# Patient Record
Sex: MALE | Race: WHITE | Employment: OTHER | URBAN - NONMETROPOLITAN AREA
[De-identification: names, ages, dates, MRNs, and addresses within clinical notes are randomized per-mention and may not be internally consistent; named-entity substitution may affect disease eponyms.]

---

## 2018-11-15 ENCOUNTER — NURSE TRIAGE (OUTPATIENT)
Dept: ADMINISTRATIVE | Age: 43
End: 2018-11-15

## 2020-01-01 ENCOUNTER — APPOINTMENT (OUTPATIENT)
Dept: GENERAL RADIOLOGY | Age: 45
DRG: 041 | End: 2020-01-01
Payer: COMMERCIAL

## 2020-01-01 ENCOUNTER — TELEPHONE (OUTPATIENT)
Dept: FAMILY MEDICINE CLINIC | Age: 45
End: 2020-01-01

## 2020-01-01 ENCOUNTER — TELEPHONE (OUTPATIENT)
Dept: ONCOLOGY | Age: 45
End: 2020-01-01

## 2020-01-01 ENCOUNTER — APPOINTMENT (OUTPATIENT)
Dept: CT IMAGING | Age: 45
DRG: 041 | End: 2020-01-01
Payer: COMMERCIAL

## 2020-01-01 ENCOUNTER — HOSPITAL ENCOUNTER (OUTPATIENT)
Dept: RADIATION ONCOLOGY | Age: 45
Discharge: HOME | End: 2020-02-19
Attending: RADIOLOGY
Payer: COMMERCIAL

## 2020-01-01 ENCOUNTER — HOSPITAL ENCOUNTER (OUTPATIENT)
Dept: INFUSION THERAPY | Age: 45
Discharge: HOME | End: 2020-03-12
Payer: COMMERCIAL

## 2020-01-01 ENCOUNTER — HOSPITAL ENCOUNTER (INPATIENT)
Age: 45
LOS: 2 days | DRG: 041 | End: 2020-05-20
Attending: INTERNAL MEDICINE | Admitting: INTERNAL MEDICINE
Payer: COMMERCIAL

## 2020-01-01 ENCOUNTER — OFFICE VISIT (OUTPATIENT)
Dept: ONCOLOGY | Age: 45
End: 2020-01-01
Payer: COMMERCIAL

## 2020-01-01 ENCOUNTER — HOSPITAL ENCOUNTER (OUTPATIENT)
Dept: RADIATION ONCOLOGY | Age: 45
Discharge: HOME | End: 2020-02-24
Attending: RADIOLOGY
Payer: COMMERCIAL

## 2020-01-01 ENCOUNTER — HOSPITAL ENCOUNTER (INPATIENT)
Age: 45
LOS: 7 days | Discharge: HOME | DRG: 133 | End: 2020-02-17
Attending: EMERGENCY MEDICINE | Admitting: INTERNAL MEDICINE
Payer: COMMERCIAL

## 2020-01-01 ENCOUNTER — TELEPHONE (OUTPATIENT)
Dept: INFUSION THERAPY | Age: 45
End: 2020-01-01

## 2020-01-01 ENCOUNTER — HOSPITAL ENCOUNTER (OUTPATIENT)
Dept: RADIATION ONCOLOGY | Age: 45
Discharge: HOME | End: 2020-02-13
Attending: RADIOLOGY
Payer: COMMERCIAL

## 2020-01-01 ENCOUNTER — APPOINTMENT (OUTPATIENT)
Dept: MRI IMAGING | Age: 45
DRG: 133 | End: 2020-01-01
Payer: COMMERCIAL

## 2020-01-01 ENCOUNTER — HOSPITAL ENCOUNTER (OUTPATIENT)
Dept: MRI IMAGING | Age: 45
Discharge: HOME | End: 2020-04-02
Payer: COMMERCIAL

## 2020-01-01 ENCOUNTER — HOSPITAL ENCOUNTER (OUTPATIENT)
Dept: RADIATION ONCOLOGY | Age: 45
End: 2020-01-01
Attending: RADIOLOGY
Payer: COMMERCIAL

## 2020-01-01 ENCOUNTER — OFFICE VISIT (OUTPATIENT)
Dept: NEUROSURGERY | Age: 45
End: 2020-01-01
Payer: COMMERCIAL

## 2020-01-01 ENCOUNTER — HOSPITAL ENCOUNTER (OUTPATIENT)
Dept: RADIATION ONCOLOGY | Age: 45
Discharge: HOME | End: 2020-02-11
Payer: COMMERCIAL

## 2020-01-01 ENCOUNTER — OFFICE VISIT (OUTPATIENT)
Dept: FAMILY MEDICINE CLINIC | Age: 45
End: 2020-01-01
Payer: COMMERCIAL

## 2020-01-01 ENCOUNTER — APPOINTMENT (OUTPATIENT)
Dept: CT IMAGING | Age: 45
DRG: 133 | End: 2020-01-01
Payer: COMMERCIAL

## 2020-01-01 ENCOUNTER — HOSPITAL ENCOUNTER (OUTPATIENT)
Dept: RADIATION ONCOLOGY | Age: 45
Discharge: HOME | End: 2020-02-21
Attending: RADIOLOGY
Payer: COMMERCIAL

## 2020-01-01 ENCOUNTER — HOSPITAL ENCOUNTER (OUTPATIENT)
Dept: RADIATION ONCOLOGY | Age: 45
Discharge: HOME | End: 2020-04-08
Payer: COMMERCIAL

## 2020-01-01 ENCOUNTER — HOSPITAL ENCOUNTER (INPATIENT)
Age: 45
LOS: 6 days | Discharge: HOSPICE | DRG: 041 | End: 2020-05-18
Attending: EMERGENCY MEDICINE | Admitting: INTERNAL MEDICINE
Payer: COMMERCIAL

## 2020-01-01 ENCOUNTER — HOSPITAL ENCOUNTER (OUTPATIENT)
Dept: INFUSION THERAPY | Age: 45
Discharge: HOME | End: 2020-05-07
Payer: COMMERCIAL

## 2020-01-01 ENCOUNTER — HOSPITAL ENCOUNTER (OUTPATIENT)
Dept: RADIATION ONCOLOGY | Age: 45
Discharge: HOME | End: 2020-02-18
Attending: RADIOLOGY
Payer: COMMERCIAL

## 2020-01-01 ENCOUNTER — HOSPITAL ENCOUNTER (OUTPATIENT)
Dept: RADIATION ONCOLOGY | Age: 45
Discharge: HOME | End: 2020-02-20
Attending: RADIOLOGY
Payer: COMMERCIAL

## 2020-01-01 ENCOUNTER — HOSPITAL ENCOUNTER (OUTPATIENT)
Dept: INFUSION THERAPY | Age: 45
Discharge: HOME | End: 2020-04-09
Payer: COMMERCIAL

## 2020-01-01 ENCOUNTER — APPOINTMENT (OUTPATIENT)
Dept: GENERAL RADIOLOGY | Age: 45
DRG: 133 | End: 2020-01-01
Payer: COMMERCIAL

## 2020-01-01 ENCOUNTER — HOSPITAL ENCOUNTER (OUTPATIENT)
Dept: RADIATION ONCOLOGY | Age: 45
Discharge: HOME | End: 2020-02-27
Attending: RADIOLOGY
Payer: COMMERCIAL

## 2020-01-01 ENCOUNTER — HOSPITAL ENCOUNTER (OUTPATIENT)
Dept: RADIATION ONCOLOGY | Age: 45
Discharge: HOME | End: 2020-02-26
Attending: RADIOLOGY
Payer: COMMERCIAL

## 2020-01-01 ENCOUNTER — HOSPITAL ENCOUNTER (OUTPATIENT)
Dept: RADIATION ONCOLOGY | Age: 45
Discharge: HOME | End: 2020-04-23
Payer: COMMERCIAL

## 2020-01-01 ENCOUNTER — NURSE ONLY (OUTPATIENT)
Dept: INFUSION THERAPY | Age: 45
End: 2020-01-01

## 2020-01-01 ENCOUNTER — TELEPHONE (OUTPATIENT)
Dept: NEUROSURGERY | Age: 45
End: 2020-01-01

## 2020-01-01 ENCOUNTER — INITIAL CONSULT (OUTPATIENT)
Dept: NEUROLOGY | Age: 45
End: 2020-01-01
Payer: COMMERCIAL

## 2020-01-01 ENCOUNTER — HOSPITAL ENCOUNTER (OUTPATIENT)
Dept: RADIATION ONCOLOGY | Age: 45
Discharge: HOME | End: 2020-02-25
Attending: RADIOLOGY
Payer: COMMERCIAL

## 2020-01-01 ENCOUNTER — HOSPITAL ENCOUNTER (INPATIENT)
Dept: CT IMAGING | Age: 45
Discharge: HOME | DRG: 133 | End: 2020-02-13
Payer: COMMERCIAL

## 2020-01-01 ENCOUNTER — HOSPITAL ENCOUNTER (OUTPATIENT)
Dept: RADIATION ONCOLOGY | Age: 45
Discharge: HOME | End: 2020-02-14
Attending: RADIOLOGY
Payer: COMMERCIAL

## 2020-01-01 VITALS
DIASTOLIC BLOOD PRESSURE: 78 MMHG | WEIGHT: 196.8 LBS | BODY MASS INDEX: 28.18 KG/M2 | HEIGHT: 70 IN | OXYGEN SATURATION: 97 % | HEART RATE: 84 BPM | SYSTOLIC BLOOD PRESSURE: 131 MMHG | TEMPERATURE: 97.7 F | RESPIRATION RATE: 19 BRPM

## 2020-01-01 VITALS
SYSTOLIC BLOOD PRESSURE: 165 MMHG | BODY MASS INDEX: 25.66 KG/M2 | WEIGHT: 179.23 LBS | DIASTOLIC BLOOD PRESSURE: 90 MMHG | HEART RATE: 91 BPM | TEMPERATURE: 97.5 F | RESPIRATION RATE: 20 BRPM | HEIGHT: 70 IN | OXYGEN SATURATION: 93 %

## 2020-01-01 VITALS
DIASTOLIC BLOOD PRESSURE: 98 MMHG | TEMPERATURE: 98.5 F | WEIGHT: 198.8 LBS | SYSTOLIC BLOOD PRESSURE: 150 MMHG | HEART RATE: 116 BPM | BODY MASS INDEX: 28.52 KG/M2

## 2020-01-01 VITALS
SYSTOLIC BLOOD PRESSURE: 130 MMHG | WEIGHT: 192.2 LBS | BODY MASS INDEX: 27.52 KG/M2 | RESPIRATION RATE: 18 BRPM | HEIGHT: 70 IN | HEART RATE: 125 BPM | DIASTOLIC BLOOD PRESSURE: 85 MMHG | OXYGEN SATURATION: 95 % | TEMPERATURE: 98.2 F

## 2020-01-01 VITALS
OXYGEN SATURATION: 97 % | RESPIRATION RATE: 16 BRPM | HEART RATE: 129 BPM | DIASTOLIC BLOOD PRESSURE: 76 MMHG | SYSTOLIC BLOOD PRESSURE: 127 MMHG | TEMPERATURE: 97.3 F

## 2020-01-01 VITALS
RESPIRATION RATE: 16 BRPM | DIASTOLIC BLOOD PRESSURE: 86 MMHG | TEMPERATURE: 96.6 F | BODY MASS INDEX: 27.96 KG/M2 | OXYGEN SATURATION: 98 % | SYSTOLIC BLOOD PRESSURE: 149 MMHG | HEART RATE: 76 BPM | WEIGHT: 194.89 LBS

## 2020-01-01 VITALS
BODY MASS INDEX: 27.6 KG/M2 | SYSTOLIC BLOOD PRESSURE: 138 MMHG | WEIGHT: 192.8 LBS | HEART RATE: 84 BPM | DIASTOLIC BLOOD PRESSURE: 88 MMHG | HEIGHT: 70 IN

## 2020-01-01 VITALS
DIASTOLIC BLOOD PRESSURE: 82 MMHG | SYSTOLIC BLOOD PRESSURE: 130 MMHG | HEIGHT: 70 IN | BODY MASS INDEX: 28.89 KG/M2 | WEIGHT: 201.8 LBS | TEMPERATURE: 97.9 F | OXYGEN SATURATION: 98 % | RESPIRATION RATE: 16 BRPM | HEART RATE: 127 BPM

## 2020-01-01 VITALS
HEART RATE: 167 BPM | TEMPERATURE: 99.9 F | SYSTOLIC BLOOD PRESSURE: 88 MMHG | RESPIRATION RATE: 30 BRPM | OXYGEN SATURATION: 70 % | DIASTOLIC BLOOD PRESSURE: 57 MMHG

## 2020-01-01 VITALS
OXYGEN SATURATION: 97 % | BODY MASS INDEX: 29.2 KG/M2 | HEART RATE: 92 BPM | RESPIRATION RATE: 16 BRPM | HEIGHT: 70 IN | TEMPERATURE: 97.6 F | SYSTOLIC BLOOD PRESSURE: 157 MMHG | WEIGHT: 204 LBS | DIASTOLIC BLOOD PRESSURE: 89 MMHG

## 2020-01-01 VITALS
RESPIRATION RATE: 18 BRPM | TEMPERATURE: 99.4 F | OXYGEN SATURATION: 100 % | BODY MASS INDEX: 27.97 KG/M2 | DIASTOLIC BLOOD PRESSURE: 89 MMHG | WEIGHT: 195.4 LBS | SYSTOLIC BLOOD PRESSURE: 158 MMHG | HEART RATE: 96 BPM | HEIGHT: 70 IN

## 2020-01-01 DIAGNOSIS — R56.9 SEIZURES (HCC): ICD-10-CM

## 2020-01-01 DIAGNOSIS — C79.31 MALIGNANT MELANOMA METASTATIC TO BRAIN (HCC): ICD-10-CM

## 2020-01-01 LAB
ABSOLUTE IMMATURE GRANULOCYTE: 0.33 THOU/MM3 (ref 0–0.07)
ACETAMINOPHEN LEVEL: < 5 UG/ML (ref 0–20)
ALBUMIN SERPL-MCNC: 4 G/DL (ref 3.5–5.1)
ALBUMIN SERPL-MCNC: 4.1 G/DL (ref 3.5–5.1)
ALBUMIN SERPL-MCNC: 4.3 G/DL (ref 3.5–5.1)
ALBUMIN SERPL-MCNC: 4.6 G/DL (ref 3.5–5.1)
ALP BLD-CCNC: 49 U/L (ref 38–126)
ALP BLD-CCNC: 50 U/L (ref 38–126)
ALP BLD-CCNC: 50 U/L (ref 38–126)
ALP BLD-CCNC: 55 U/L (ref 38–126)
ALP BLD-CCNC: 59 U/L (ref 38–126)
ALP BLD-CCNC: 77 U/L (ref 38–126)
ALT SERPL-CCNC: 111 U/L (ref 11–66)
ALT SERPL-CCNC: 31 U/L (ref 11–66)
ALT SERPL-CCNC: 40 U/L (ref 11–66)
ALT SERPL-CCNC: 57 U/L (ref 11–66)
ALT SERPL-CCNC: 66 U/L (ref 11–66)
ALT SERPL-CCNC: 83 U/L (ref 11–66)
AMMONIA: 179 UMOL/L (ref 11–60)
AMPHETAMINE+METHAMPHETAMINE URINE SCREEN: NEGATIVE
AMPHETAMINE+METHAMPHETAMINE URINE SCREEN: NEGATIVE
ANAEROBIC CULTURE: ABNORMAL
ANION GAP SERPL CALCULATED.3IONS-SCNC: 10 MEQ/L (ref 8–16)
ANION GAP SERPL CALCULATED.3IONS-SCNC: 11 MEQ/L (ref 8–16)
ANION GAP SERPL CALCULATED.3IONS-SCNC: 11 MEQ/L (ref 8–16)
ANION GAP SERPL CALCULATED.3IONS-SCNC: 13 MEQ/L (ref 8–16)
ANION GAP SERPL CALCULATED.3IONS-SCNC: 15 MEQ/L (ref 8–16)
ANION GAP SERPL CALCULATED.3IONS-SCNC: 16 MEQ/L (ref 8–16)
ANION GAP SERPL CALCULATED.3IONS-SCNC: 26 MEQ/L (ref 8–16)
ANION GAP SERPL CALCULATED.3IONS-SCNC: 7 MEQ/L (ref 8–16)
ANION GAP SERPL CALCULATED.3IONS-SCNC: 9 MEQ/L (ref 8–16)
AST SERPL-CCNC: 14 U/L (ref 5–40)
AST SERPL-CCNC: 19 U/L (ref 5–40)
AST SERPL-CCNC: 23 U/L (ref 5–40)
AST SERPL-CCNC: 27 U/L (ref 5–40)
AST SERPL-CCNC: 35 U/L (ref 5–40)
AST SERPL-CCNC: 60 U/L (ref 5–40)
AVERAGE GLUCOSE: 120 MG/DL (ref 70–126)
BACTERIA: ABNORMAL
BARBITURATE QUANTITATIVE URINE: NEGATIVE
BARBITURATE QUANTITATIVE URINE: NEGATIVE
BASE EXCESS (CALCULATED): -7.7 MMOL/L (ref -2.5–2.5)
BASINOPHIL, AUTOMATED: 0 % (ref 0–3)
BASOPHILS # BLD: 0 %
BASOPHILS # BLD: 0.1 %
BASOPHILS # BLD: 0.2 %
BASOPHILS # BLD: 0.5 %
BASOPHILS ABSOLUTE: 0 THOU/MM3 (ref 0–0.1)
BASOPHILS ABSOLUTE: 0.1 THOU/MM3 (ref 0–0.1)
BENZODIAZEPINE QUANTITATIVE URINE: NEGATIVE
BENZODIAZEPINE QUANTITATIVE URINE: NEGATIVE
BILIRUB SERPL-MCNC: 0.2 MG/DL (ref 0.3–1.2)
BILIRUB SERPL-MCNC: 0.4 MG/DL (ref 0.3–1.2)
BILIRUB SERPL-MCNC: 0.6 MG/DL (ref 0.3–1.2)
BILIRUBIN DIRECT: < 0.2 MG/DL (ref 0–0.3)
BILIRUBIN URINE: NEGATIVE
BILIRUBIN URINE: NEGATIVE
BLOOD, URINE: ABNORMAL
BLOOD, URINE: NEGATIVE
BODY FLUID CULTURE, STERILE: ABNORMAL
BUN BLDV-MCNC: 11 MG/DL (ref 7–22)
BUN BLDV-MCNC: 12 MG/DL (ref 7–22)
BUN BLDV-MCNC: 12 MG/DL (ref 7–22)
BUN BLDV-MCNC: 13 MG/DL (ref 7–22)
BUN BLDV-MCNC: 13 MG/DL (ref 7–22)
BUN BLDV-MCNC: 17 MG/DL (ref 7–22)
BUN BLDV-MCNC: 17 MG/DL (ref 7–22)
BUN BLDV-MCNC: 18 MG/DL (ref 7–22)
BUN BLDV-MCNC: 19 MG/DL (ref 7–22)
BUN BLDV-MCNC: 22 MG/DL (ref 7–22)
BUN BLDV-MCNC: 23 MG/DL (ref 7–22)
BUN BLDV-MCNC: 23 MG/DL (ref 7–22)
BUN BLDV-MCNC: 25 MG/DL (ref 7–22)
CALCIUM SERPL-MCNC: 8.5 MG/DL (ref 8.5–10.5)
CALCIUM SERPL-MCNC: 8.8 MG/DL (ref 8.5–10.5)
CALCIUM SERPL-MCNC: 9.1 MG/DL (ref 8.5–10.5)
CALCIUM SERPL-MCNC: 9.2 MG/DL (ref 8.5–10.5)
CALCIUM SERPL-MCNC: 9.2 MG/DL (ref 8.5–10.5)
CALCIUM SERPL-MCNC: 9.3 MG/DL (ref 8.5–10.5)
CALCIUM SERPL-MCNC: 9.4 MG/DL (ref 8.5–10.5)
CALCIUM SERPL-MCNC: 9.4 MG/DL (ref 8.5–10.5)
CALCIUM SERPL-MCNC: 9.5 MG/DL (ref 8.5–10.5)
CALCIUM SERPL-MCNC: 9.5 MG/DL (ref 8.5–10.5)
CALCIUM SERPL-MCNC: 9.7 MG/DL (ref 8.5–10.5)
CANNABINOID QUANTITATIVE URINE: NEGATIVE
CANNABINOID QUANTITATIVE URINE: NEGATIVE
CASTS: ABNORMAL /LPF
CASTS: ABNORMAL /LPF
CHARACTER, URINE: CLEAR
CHARACTER, URINE: CLEAR
CHLORIDE BLD-SCNC: 101 MEQ/L (ref 98–111)
CHLORIDE BLD-SCNC: 103 MEQ/L (ref 98–111)
CHLORIDE BLD-SCNC: 104 MEQ/L (ref 98–111)
CHLORIDE BLD-SCNC: 105 MEQ/L (ref 98–111)
CHLORIDE BLD-SCNC: 106 MEQ/L (ref 98–111)
CHLORIDE BLD-SCNC: 108 MEQ/L (ref 98–111)
CHLORIDE BLD-SCNC: 97 MEQ/L (ref 98–111)
CHLORIDE, WHOLE BLOOD: 104 MEQ/L (ref 98–109)
CHLORIDE, WHOLE BLOOD: 113 MEQ/L (ref 98–109)
CO2: 12 MEQ/L (ref 23–33)
CO2: 19 MEQ/L (ref 23–33)
CO2: 20 MEQ/L (ref 23–33)
CO2: 20 MEQ/L (ref 23–33)
CO2: 21 MEQ/L (ref 23–33)
CO2: 21 MEQ/L (ref 23–33)
CO2: 22 MEQ/L (ref 23–33)
CO2: 24 MEQ/L (ref 23–33)
CO2: 25 MEQ/L (ref 23–33)
CO2: 25 MEQ/L (ref 23–33)
CO2: 28 MEQ/L (ref 23–33)
COCAINE METABOLITE QUANTITATIVE URINE: NEGATIVE
COCAINE METABOLITE QUANTITATIVE URINE: NEGATIVE
COLLECTED BY:: ABNORMAL
COLOR: YELLOW
COLOR: YELLOW
CREAT SERPL-MCNC: 0.4 MG/DL (ref 0.4–1.2)
CREAT SERPL-MCNC: 0.5 MG/DL (ref 0.4–1.2)
CREAT SERPL-MCNC: 0.5 MG/DL (ref 0.4–1.2)
CREAT SERPL-MCNC: 0.7 MG/DL (ref 0.4–1.2)
CREAT SERPL-MCNC: 0.7 MG/DL (ref 0.4–1.2)
CREAT SERPL-MCNC: 0.8 MG/DL (ref 0.4–1.2)
CREAT SERPL-MCNC: 0.9 MG/DL (ref 0.4–1.2)
CREAT SERPL-MCNC: 1 MG/DL (ref 0.4–1.2)
CREATININE, WHOLE BLOOD: 0.8 MG/DL (ref 0.5–1.2)
CREATININE, WHOLE BLOOD: 1 MG/DL (ref 0.5–1.2)
CRYSTALS: ABNORMAL
DEVICE: ABNORMAL
DIFFERENTIAL, MANUAL: NORMAL
EKG ATRIAL RATE: 100 BPM
EKG P AXIS: 49 DEGREES
EKG P-R INTERVAL: 160 MS
EKG Q-T INTERVAL: 350 MS
EKG QRS DURATION: 100 MS
EKG QTC CALCULATION (BAZETT): 451 MS
EKG R AXIS: -42 DEGREES
EKG T AXIS: 58 DEGREES
EKG VENTRICULAR RATE: 100 BPM
EOSINOPHIL # BLD: 0 %
EOSINOPHIL # BLD: 0.6 %
EOSINOPHILS ABSOLUTE: 0 THOU/MM3 (ref 0–0.4)
EOSINOPHILS ABSOLUTE: 0.1 THOU/MM3 (ref 0–0.4)
EOSINOPHILS RELATIVE PERCENT: 0 % (ref 0–4)
EPITHELIAL CELLS, UA: ABNORMAL /HPF
ERYTHROCYTE [DISTWIDTH] IN BLOOD BY AUTOMATED COUNT: 12.4 % (ref 11.5–14.5)
ERYTHROCYTE [DISTWIDTH] IN BLOOD BY AUTOMATED COUNT: 12.7 % (ref 11.5–14.5)
ERYTHROCYTE [DISTWIDTH] IN BLOOD BY AUTOMATED COUNT: 13.1 % (ref 11.5–14.5)
ERYTHROCYTE [DISTWIDTH] IN BLOOD BY AUTOMATED COUNT: 13.2 % (ref 11.5–14.5)
ERYTHROCYTE [DISTWIDTH] IN BLOOD BY AUTOMATED COUNT: 13.3 % (ref 11.5–14.5)
ERYTHROCYTE [DISTWIDTH] IN BLOOD BY AUTOMATED COUNT: 13.6 % (ref 11.5–14.5)
ERYTHROCYTE [DISTWIDTH] IN BLOOD BY AUTOMATED COUNT: 15 % (ref 11.5–14.5)
ERYTHROCYTE [DISTWIDTH] IN BLOOD BY AUTOMATED COUNT: 15.2 % (ref 11.5–14.5)
ERYTHROCYTE [DISTWIDTH] IN BLOOD BY AUTOMATED COUNT: 15.3 % (ref 11.5–14.5)
ERYTHROCYTE [DISTWIDTH] IN BLOOD BY AUTOMATED COUNT: 15.4 % (ref 11.5–14.5)
ERYTHROCYTE [DISTWIDTH] IN BLOOD BY AUTOMATED COUNT: 43.3 FL (ref 35–45)
ERYTHROCYTE [DISTWIDTH] IN BLOOD BY AUTOMATED COUNT: 44.5 FL (ref 35–45)
ERYTHROCYTE [DISTWIDTH] IN BLOOD BY AUTOMATED COUNT: 46.5 FL (ref 35–45)
ERYTHROCYTE [DISTWIDTH] IN BLOOD BY AUTOMATED COUNT: 47.9 FL (ref 35–45)
ERYTHROCYTE [DISTWIDTH] IN BLOOD BY AUTOMATED COUNT: 49.7 FL (ref 35–45)
ERYTHROCYTE [DISTWIDTH] IN BLOOD BY AUTOMATED COUNT: 50.4 FL (ref 35–45)
ERYTHROCYTE [DISTWIDTH] IN BLOOD BY AUTOMATED COUNT: 54.2 FL (ref 35–45)
ERYTHROCYTE [DISTWIDTH] IN BLOOD BY AUTOMATED COUNT: 54.4 FL (ref 35–45)
ERYTHROCYTE [DISTWIDTH] IN BLOOD BY AUTOMATED COUNT: 55 FL (ref 35–45)
ERYTHROCYTE [DISTWIDTH] IN BLOOD BY AUTOMATED COUNT: 56.5 FL (ref 35–45)
ETHYL ALCOHOL, SERUM: < 0.01 %
ETHYL ALCOHOL, SERUM: < 0.01 %
GFR SERPL CREATININE-BSD FRML MDRD: 80 ML/MIN/1.73M2
GFR SERPL CREATININE-BSD FRML MDRD: > 90 ML/MIN/1.73M2
GFR, ESTIMATED: 85 ML/MIN/1.73M2
GFR, ESTIMATED: > 90 ML/MIN/1.73M2
GLUCOSE BLD-MCNC: 103 MG/DL (ref 70–108)
GLUCOSE BLD-MCNC: 104 MG/DL (ref 70–108)
GLUCOSE BLD-MCNC: 110 MG/DL (ref 70–108)
GLUCOSE BLD-MCNC: 112 MG/DL (ref 70–108)
GLUCOSE BLD-MCNC: 113 MG/DL (ref 70–108)
GLUCOSE BLD-MCNC: 117 MG/DL (ref 70–108)
GLUCOSE BLD-MCNC: 123 MG/DL (ref 70–108)
GLUCOSE BLD-MCNC: 124 MG/DL (ref 70–108)
GLUCOSE BLD-MCNC: 124 MG/DL (ref 70–108)
GLUCOSE BLD-MCNC: 126 MG/DL (ref 70–108)
GLUCOSE BLD-MCNC: 126 MG/DL (ref 70–108)
GLUCOSE BLD-MCNC: 129 MG/DL (ref 70–108)
GLUCOSE BLD-MCNC: 133 MG/DL (ref 70–108)
GLUCOSE BLD-MCNC: 133 MG/DL (ref 70–108)
GLUCOSE BLD-MCNC: 134 MG/DL (ref 70–108)
GLUCOSE BLD-MCNC: 134 MG/DL (ref 70–108)
GLUCOSE BLD-MCNC: 135 MG/DL (ref 70–108)
GLUCOSE BLD-MCNC: 139 MG/DL (ref 70–108)
GLUCOSE BLD-MCNC: 143 MG/DL (ref 70–108)
GLUCOSE BLD-MCNC: 148 MG/DL (ref 70–108)
GLUCOSE BLD-MCNC: 151 MG/DL (ref 70–108)
GLUCOSE BLD-MCNC: 152 MG/DL (ref 70–108)
GLUCOSE BLD-MCNC: 155 MG/DL (ref 70–108)
GLUCOSE BLD-MCNC: 155 MG/DL (ref 70–108)
GLUCOSE BLD-MCNC: 160 MG/DL (ref 70–108)
GLUCOSE BLD-MCNC: 167 MG/DL (ref 70–108)
GLUCOSE BLD-MCNC: 171 MG/DL (ref 70–108)
GLUCOSE BLD-MCNC: 172 MG/DL (ref 70–108)
GLUCOSE BLD-MCNC: 186 MG/DL (ref 70–108)
GLUCOSE BLD-MCNC: 195 MG/DL (ref 70–108)
GLUCOSE BLD-MCNC: 196 MG/DL (ref 70–108)
GLUCOSE BLD-MCNC: 198 MG/DL (ref 70–108)
GLUCOSE BLD-MCNC: 199 MG/DL (ref 70–108)
GLUCOSE BLD-MCNC: 199 MG/DL (ref 70–108)
GLUCOSE BLD-MCNC: 201 MG/DL (ref 70–108)
GLUCOSE BLD-MCNC: 206 MG/DL (ref 70–108)
GLUCOSE BLD-MCNC: 212 MG/DL (ref 70–108)
GLUCOSE BLD-MCNC: 213 MG/DL (ref 70–108)
GLUCOSE BLD-MCNC: 213 MG/DL (ref 70–108)
GLUCOSE BLD-MCNC: 214 MG/DL (ref 70–108)
GLUCOSE BLD-MCNC: 221 MG/DL (ref 70–108)
GLUCOSE BLD-MCNC: 222 MG/DL (ref 70–108)
GLUCOSE BLD-MCNC: 223 MG/DL (ref 70–108)
GLUCOSE BLD-MCNC: 225 MG/DL (ref 70–108)
GLUCOSE BLD-MCNC: 226 MG/DL (ref 70–108)
GLUCOSE BLD-MCNC: 226 MG/DL (ref 70–108)
GLUCOSE BLD-MCNC: 235 MG/DL (ref 70–108)
GLUCOSE BLD-MCNC: 236 MG/DL (ref 70–108)
GLUCOSE BLD-MCNC: 237 MG/DL (ref 70–108)
GLUCOSE BLD-MCNC: 237 MG/DL (ref 70–108)
GLUCOSE BLD-MCNC: 239 MG/DL (ref 70–108)
GLUCOSE BLD-MCNC: 242 MG/DL (ref 70–108)
GLUCOSE BLD-MCNC: 248 MG/DL (ref 70–108)
GLUCOSE BLD-MCNC: 253 MG/DL (ref 70–108)
GLUCOSE BLD-MCNC: 255 MG/DL (ref 70–108)
GLUCOSE BLD-MCNC: 259 MG/DL (ref 70–108)
GLUCOSE BLD-MCNC: 260 MG/DL (ref 70–108)
GLUCOSE BLD-MCNC: 261 MG/DL (ref 70–108)
GLUCOSE BLD-MCNC: 288 MG/DL (ref 70–108)
GLUCOSE BLD-MCNC: 87 MG/DL (ref 70–108)
GLUCOSE BLD-MCNC: 95 MG/DL (ref 70–108)
GLUCOSE URINE: 500 MG/DL
GLUCOSE, URINE: NEGATIVE MG/DL
GLUCOSE, WHOLE BLOOD: 142 MG/DL (ref 70–108)
GLUCOSE, WHOLE BLOOD: 182 MG/DL (ref 70–108)
GRAM STAIN RESULT: ABNORMAL
GRAM STAIN RESULT: NORMAL
HBA1C MFR BLD: 6 % (ref 4.4–6.4)
HCO3: 19 MMOL/L (ref 23–28)
HCT VFR BLD CALC: 36.7 % (ref 42–52)
HCT VFR BLD CALC: 37.8 % (ref 42–52)
HCT VFR BLD CALC: 38.2 % (ref 42–52)
HCT VFR BLD CALC: 40.2 % (ref 42–52)
HCT VFR BLD CALC: 41.1 % (ref 42–52)
HCT VFR BLD CALC: 43.4 % (ref 42–52)
HCT VFR BLD CALC: 44.4 % (ref 42–52)
HCT VFR BLD CALC: 45.1 % (ref 42–52)
HCT VFR BLD CALC: 46.1 % (ref 42–52)
HCT VFR BLD CALC: 46.4 % (ref 42–52)
HCT VFR BLD CALC: 46.6 % (ref 42–52)
HCT VFR BLD CALC: 52.2 % (ref 42–52)
HEMOGLOBIN: 12.4 GM/DL (ref 14–18)
HEMOGLOBIN: 12.6 GM/DL (ref 14–18)
HEMOGLOBIN: 12.9 GM/DL (ref 14–18)
HEMOGLOBIN: 13.3 GM/DL (ref 14–18)
HEMOGLOBIN: 13.7 GM/DL (ref 14–18)
HEMOGLOBIN: 14.7 GM/DL (ref 14–18)
HEMOGLOBIN: 15.1 GM/DL (ref 14–18)
HEMOGLOBIN: 15.1 GM/DL (ref 14–18)
HEMOGLOBIN: 15.4 GM/DL (ref 14–18)
HEMOGLOBIN: 15.6 GM/DL (ref 14–18)
HEMOGLOBIN: 15.9 GM/DL (ref 14–18)
HEMOGLOBIN: 17.1 GM/DL (ref 14–18)
IFIO2: 6
IMMATURE GRANS (ABS): 0.09 THOU/MM3 (ref 0–0.07)
IMMATURE GRANS (ABS): 0.12 THOU/MM3 (ref 0–0.07)
IMMATURE GRANS (ABS): 0.16 THOU/MM3 (ref 0–0.07)
IMMATURE GRANULOCYTES: 0.6 %
IMMATURE GRANULOCYTES: 0.7 %
IMMATURE GRANULOCYTES: 1.4 %
IMMATURE GRANULOCYTES: 3 %
INR BLD: 1.03 (ref 0.85–1.13)
IONIZED CALCIUM, WHOLE BLOOD: 1.17 MMOL/L (ref 1.12–1.32)
IONIZED CALCIUM, WHOLE BLOOD: 1.18 MMOL/L (ref 1.12–1.32)
KETONES, URINE: 15
KETONES, URINE: NEGATIVE
LACOSAMIDE: 2.6 UG/ML (ref 5–10)
LEUKOCYTE EST, POC: NEGATIVE
LEUKOCYTE ESTERASE, URINE: NEGATIVE
LIPASE: 36.8 U/L (ref 5.6–51.3)
LIPASE: 44.8 U/L (ref 5.6–51.3)
LYMPHOCYTES # BLD: 14 %
LYMPHOCYTES # BLD: 16 % (ref 15–47)
LYMPHOCYTES # BLD: 3.2 %
LYMPHOCYTES # BLD: 5.7 %
LYMPHOCYTES # BLD: 7 %
LYMPHOCYTES ABSOLUTE: 0.5 THOU/MM3 (ref 1–4.8)
LYMPHOCYTES ABSOLUTE: 0.6 THOU/MM3 (ref 1–4.8)
LYMPHOCYTES ABSOLUTE: 0.9 THOU/MM3 (ref 1–4.8)
LYMPHOCYTES ABSOLUTE: 1.7 THOU/MM3 (ref 1–4.8)
LYMPHOCYTES ABSOLUTE: 2.4 THOU/MM3 (ref 1–4.8)
MAGNESIUM: 2.1 MG/DL (ref 1.6–2.4)
MCH RBC QN AUTO: 32.4 PG (ref 26–33)
MCH RBC QN AUTO: 32.4 PG (ref 26–33)
MCH RBC QN AUTO: 32.5 PG (ref 26–33)
MCH RBC QN AUTO: 32.5 PG (ref 26–33)
MCH RBC QN AUTO: 32.6 PG (ref 26–33)
MCH RBC QN AUTO: 32.6 PG (ref 27–31)
MCH RBC QN AUTO: 32.7 PG (ref 26–33)
MCH RBC QN AUTO: 33 PG (ref 26–33)
MCH RBC QN AUTO: 33.1 PG (ref 26–33)
MCH RBC QN AUTO: 33.2 PG (ref 26–33)
MCH RBC QN AUTO: 33.2 PG (ref 26–33)
MCH RBC QN AUTO: 33.7 PG (ref 26–33)
MCHC RBC AUTO-ENTMCNC: 32.8 GM/DL (ref 32.2–35.5)
MCHC RBC AUTO-ENTMCNC: 32.8 GM/DL (ref 32.2–35.5)
MCHC RBC AUTO-ENTMCNC: 33.1 GM/DL (ref 32.2–35.5)
MCHC RBC AUTO-ENTMCNC: 33.2 GM/DL (ref 32.2–35.5)
MCHC RBC AUTO-ENTMCNC: 33.3 GM/DL (ref 32.2–35.5)
MCHC RBC AUTO-ENTMCNC: 33.3 GM/DL (ref 32.2–35.5)
MCHC RBC AUTO-ENTMCNC: 33.5 GM/DL (ref 32.2–35.5)
MCHC RBC AUTO-ENTMCNC: 33.8 GM/DL (ref 32.2–35.5)
MCHC RBC AUTO-ENTMCNC: 33.8 GM/DL (ref 32.2–35.5)
MCHC RBC AUTO-ENTMCNC: 33.8 GM/DL (ref 33–37)
MCHC RBC AUTO-ENTMCNC: 34.1 GM/DL (ref 32.2–35.5)
MCHC RBC AUTO-ENTMCNC: 35.1 GM/DL (ref 32.2–35.5)
MCV RBC AUTO: 103 FL (ref 80–94)
MCV RBC AUTO: 94.5 FL (ref 80–94)
MCV RBC AUTO: 95.3 FL (ref 80–94)
MCV RBC AUTO: 96 FL (ref 80–94)
MCV RBC AUTO: 96.8 FL (ref 80–94)
MCV RBC AUTO: 97.5 FL (ref 80–94)
MCV RBC AUTO: 97.9 FL (ref 80–94)
MCV RBC AUTO: 98 FL (ref 80–94)
MCV RBC AUTO: 98.1 FL (ref 80–94)
MCV RBC AUTO: 98.2 FL (ref 80–94)
MCV RBC AUTO: 99.4 FL (ref 80–94)
MCV RBC AUTO: 99.8 FL (ref 80–94)
MISC REFERENCE: NORMAL
MISCELLANEOUS LAB TEST RESULT: ABNORMAL
MONOCYTES # BLD: 4 %
MONOCYTES # BLD: 4.4 %
MONOCYTES # BLD: 4.8 %
MONOCYTES # BLD: 7.5 %
MONOCYTES ABSOLUTE: 0.5 THOU/MM3 (ref 0.4–1.3)
MONOCYTES ABSOLUTE: 0.7 THOU/MM3 (ref 0.4–1.3)
MONOCYTES ABSOLUTE: 0.8 THOU/MM3 (ref 0.4–1.3)
MONOCYTES: 7 % (ref 0–12)
MRSA SCREEN RT-PCR: NEGATIVE
MRSA SCREEN RT-PCR: NEGATIVE
MRSA SCREEN: NORMAL
MRSA SCREEN: NORMAL
NITRITE, URINE: NEGATIVE
NITRITE, URINE: NEGATIVE
NUCLEATED RED BLOOD CELLS: 0 /100 WBC
O2 SATURATION: 91 %
OPIATES, URINE: NEGATIVE
OPIATES, URINE: NEGATIVE
ORGANISM: ABNORMAL
OSMOLALITY CALCULATION: 274.7 MOSMOL/KG (ref 275–300)
OSMOLALITY CALCULATION: 281.9 MOSMOL/KG (ref 275–300)
OSMOLALITY CALCULATION: 292.2 MOSMOL/KG (ref 275–300)
OXYCODONE: NEGATIVE
OXYCODONE: NEGATIVE
PCO2: 39 MMHG (ref 35–45)
PDW BLD-RTO: 12.1 % (ref 11.5–14.5)
PDW BLD-RTO: 15.1 % (ref 11.5–14.5)
PH BLOOD GAS: 7.28 (ref 7.35–7.45)
PH UA: 6.5 (ref 5–9)
PH UA: 6.5 (ref 5–9)
PH VENOUS: 7.37 (ref 7.31–7.41)
PHENCYCLIDINE QUANTITATIVE URINE: NEGATIVE
PHENCYCLIDINE QUANTITATIVE URINE: NEGATIVE
PLATELET # BLD: 134 THOU/MM3 (ref 130–400)
PLATELET # BLD: 160 THOU/MM3 (ref 130–400)
PLATELET # BLD: 169 THOU/MM3 (ref 130–400)
PLATELET # BLD: 171 THOU/MM3 (ref 130–400)
PLATELET # BLD: 192 THOU/MM3 (ref 130–400)
PLATELET # BLD: 194 THOU/MM3 (ref 130–400)
PLATELET # BLD: 197 THOU/MM3 (ref 130–400)
PLATELET # BLD: 198 THOU/MM3 (ref 130–400)
PLATELET # BLD: 198 THOU/MM3 (ref 130–400)
PLATELET # BLD: 214 THOU/MM3 (ref 130–400)
PLATELET # BLD: 236 THOU/MM3 (ref 130–400)
PLATELET # BLD: 288 THOU/MM3 (ref 130–400)
PLATELET ESTIMATE: ADEQUATE
PMV BLD AUTO: 10.2 FL (ref 9.4–12.4)
PMV BLD AUTO: 10.2 FL (ref 9.4–12.4)
PMV BLD AUTO: 10.3 FL (ref 9.4–12.4)
PMV BLD AUTO: 10.3 FL (ref 9.4–12.4)
PMV BLD AUTO: 10.4 FL (ref 9.4–12.4)
PMV BLD AUTO: 10.4 FL (ref 9.4–12.4)
PMV BLD AUTO: 10.5 FL (ref 9.4–12.4)
PMV BLD AUTO: 10.6 FL (ref 9.4–12.4)
PMV BLD AUTO: 10.8 FL (ref 9.4–12.4)
PMV BLD AUTO: 7.3 FL (ref 7.4–10.4)
PMV BLD AUTO: 9.7 FL (ref 9.4–12.4)
PMV BLD AUTO: 9.9 FL (ref 9.4–12.4)
PO2: 68 MMHG (ref 71–104)
POTASSIUM REFLEX MAGNESIUM: 4.1 MEQ/L (ref 3.5–5.2)
POTASSIUM SERPL-SCNC: 3.6 MEQ/L (ref 3.5–5.2)
POTASSIUM SERPL-SCNC: 3.8 MEQ/L (ref 3.5–5.2)
POTASSIUM SERPL-SCNC: 4.1 MEQ/L (ref 3.5–5.2)
POTASSIUM SERPL-SCNC: 4.3 MEQ/L (ref 3.5–5.2)
POTASSIUM SERPL-SCNC: 4.3 MEQ/L (ref 3.5–5.2)
POTASSIUM SERPL-SCNC: 4.4 MEQ/L (ref 3.5–5.2)
POTASSIUM SERPL-SCNC: 4.5 MEQ/L (ref 3.5–5.2)
POTASSIUM SERPL-SCNC: 4.5 MEQ/L (ref 3.5–5.2)
POTASSIUM SERPL-SCNC: 4.8 MEQ/L (ref 3.5–5.2)
POTASSIUM SERPL-SCNC: 4.9 MEQ/L (ref 3.5–5.2)
POTASSIUM, WHOLE BLOOD: 4 MEQ/L (ref 3.5–4.9)
POTASSIUM, WHOLE BLOOD: 4.6 MEQ/L (ref 3.5–4.9)
PROCALCITONIN: 0.05 NG/ML (ref 0.01–0.09)
PROCALCITONIN: 0.1 NG/ML (ref 0.01–0.09)
PROTEIN UA: NEGATIVE
PROTEIN UA: NEGATIVE MG/DL
RBC # BLD: 3.74 MILL/MM3 (ref 4.7–6.1)
RBC # BLD: 3.85 MILL/MM3 (ref 4.7–6.1)
RBC # BLD: 3.9 MILL/MM3 (ref 4.7–6.1)
RBC # BLD: 4.03 MILL/MM3 (ref 4.7–6.1)
RBC # BLD: 4.2 MILL/MM3 (ref 4.7–6.1)
RBC # BLD: 4.5 MILL/MM3 (ref 4.7–6.1)
RBC # BLD: 4.64 MILL/MM3 (ref 4.7–6.1)
RBC # BLD: 4.66 MILL/MM3 (ref 4.7–6.1)
RBC # BLD: 4.7 MILL/MM3 (ref 4.7–6.1)
RBC # BLD: 4.76 MILL/MM3 (ref 4.7–6.1)
RBC # BLD: 4.89 MILL/MM3 (ref 4.7–6.1)
RBC # BLD: 5.07 MILL/MM3 (ref 4.7–6.1)
RBC URINE: ABNORMAL /HPF
RENAL EPITHELIAL, UA: ABNORMAL
RESPIRATORY CULTURE: ABNORMAL
RESPIRATORY CULTURE: NORMAL
SALICYLATE, SERUM: < 0.3 MG/DL (ref 2–10)
SEG NEUTROPHILS: 74 % (ref 43–75)
SEG NEUTROPHILS: 79.8 %
SEG NEUTROPHILS: 85.2 %
SEG NEUTROPHILS: 89 %
SEG NEUTROPHILS: 91.3 %
SEGMENTED NEUTROPHILS ABSOLUTE COUNT: 11.2 THOU/MM3 (ref 1.8–7.7)
SEGMENTED NEUTROPHILS ABSOLUTE COUNT: 13 THOU/MM3 (ref 1.8–7.7)
SEGMENTED NEUTROPHILS ABSOLUTE COUNT: 13.5 THOU/MM3 (ref 1.8–7.7)
SEGMENTED NEUTROPHILS ABSOLUTE COUNT: 6.2 THOU/MM3 (ref 1.8–7.7)
SEGMENTED NEUTROPHILS ABSOLUTE COUNT: 7.8 THOU/MM3 (ref 1.8–7.7)
SEGMENTED NEUTROPHILS ABSOLUTE COUNT: 9.5 THOU/MM3 (ref 1.8–7.7)
SODIUM BLD-SCNC: 135 MEQ/L (ref 135–145)
SODIUM BLD-SCNC: 136 MEQ/L (ref 135–145)
SODIUM BLD-SCNC: 137 MEQ/L (ref 135–145)
SODIUM BLD-SCNC: 137 MEQ/L (ref 135–145)
SODIUM BLD-SCNC: 138 MEQ/L (ref 135–145)
SODIUM BLD-SCNC: 139 MEQ/L (ref 135–145)
SODIUM BLD-SCNC: 139 MEQ/L (ref 135–145)
SODIUM BLD-SCNC: 140 MEQ/L (ref 135–145)
SODIUM BLD-SCNC: 140 MEQ/L (ref 135–145)
SODIUM BLD-SCNC: 141 MEQ/L (ref 135–145)
SODIUM BLD-SCNC: 142 MEQ/L (ref 135–145)
SODIUM BLD-SCNC: 143 MEQ/L (ref 135–145)
SODIUM BLD-SCNC: 144 MEQ/L (ref 135–145)
SODIUM, WHOLE BLOOD: 143 MEQ/L (ref 138–146)
SODIUM, WHOLE BLOOD: 147 MEQ/L (ref 138–146)
SOURCE, BLOOD GAS: ABNORMAL
SPECIFIC GRAVITY UA: 1.01 (ref 1–1.03)
SPECIFIC GRAVITY, URINE: 1.02 (ref 1–1.03)
TOTAL CK: 69 U/L (ref 55–170)
TOTAL PROTEIN: 6.5 G/DL (ref 6.1–8)
TOTAL PROTEIN: 6.7 G/DL (ref 6.1–8)
TOTAL PROTEIN: 7 G/DL (ref 6.1–8)
TOTAL PROTEIN: 7.4 G/DL (ref 6.1–8)
TOTAL PROTEIN: 7.5 G/DL (ref 6.1–8)
TOTAL PROTEIN: 8.2 G/DL (ref 6.1–8)
TROPONIN T: < 0.01 NG/ML
TSH SERPL DL<=0.05 MIU/L-ACNC: 0.94 UIU/ML (ref 0.4–4.2)
URINE CULTURE, ROUTINE: NORMAL
URINE CULTURE, ROUTINE: NORMAL
UROBILINOGEN, URINE: 0.2 EU/DL (ref 0–1)
UROBILINOGEN, URINE: 0.2 EU/DL (ref 0–1)
VALPROIC ACID LEVEL: 22.4 UG/ML (ref 50–100)
VALPROIC ACID LEVEL: 41.7 UG/ML (ref 50–100)
VANCOMYCIN RESISTANT ENTEROCOCCUS: NEGATIVE
VANCOMYCIN RESISTANT ENTEROCOCCUS: NEGATIVE
WBC # BLD: 10.5 THOU/MM3 (ref 4.8–10.8)
WBC # BLD: 11.1 THOU/MM3 (ref 4.8–10.8)
WBC # BLD: 11.2 THOU/MM3 (ref 4.8–10.8)
WBC # BLD: 12.6 THOU/MM3 (ref 4.8–10.8)
WBC # BLD: 12.6 THOU/MM3 (ref 4.8–10.8)
WBC # BLD: 13.7 THOU/MM3 (ref 4.8–10.8)
WBC # BLD: 14.2 THOU/MM3 (ref 4.8–10.8)
WBC # BLD: 14.3 THOU/MM3 (ref 4.8–10.8)
WBC # BLD: 15 THOU/MM3 (ref 4.8–10.8)
WBC # BLD: 16.9 THOU/MM3 (ref 4.8–10.8)
WBC # BLD: 18.6 THOU/MM3 (ref 4.8–10.8)
WBC # BLD: 9.6 THOU/MM3 (ref 4.8–10.8)
WBC UA: ABNORMAL /HPF
YEAST: ABNORMAL

## 2020-01-01 PROCEDURE — 87086 URINE CULTURE/COLONY COUNT: CPT

## 2020-01-01 PROCEDURE — 2500000003 HC RX 250 WO HCPCS: Performed by: INTERNAL MEDICINE

## 2020-01-01 PROCEDURE — 87075 CULTR BACTERIA EXCEPT BLOOD: CPT

## 2020-01-01 PROCEDURE — 36415 COLL VENOUS BLD VENIPUNCTURE: CPT

## 2020-01-01 PROCEDURE — 77412 RADIATION TX DELIVERY LVL 3: CPT | Performed by: RADIOLOGY

## 2020-01-01 PROCEDURE — 97166 OT EVAL MOD COMPLEX 45 MIN: CPT

## 2020-01-01 PROCEDURE — 6370000000 HC RX 637 (ALT 250 FOR IP): Performed by: NURSE PRACTITIONER

## 2020-01-01 PROCEDURE — 6360000002 HC RX W HCPCS: Performed by: INTERNAL MEDICINE

## 2020-01-01 PROCEDURE — C9254 INJECTION, LACOSAMIDE: HCPCS | Performed by: INTERNAL MEDICINE

## 2020-01-01 PROCEDURE — 94002 VENT MGMT INPAT INIT DAY: CPT

## 2020-01-01 PROCEDURE — G8427 DOCREV CUR MEDS BY ELIG CLIN: HCPCS | Performed by: STUDENT IN AN ORGANIZED HEALTH CARE EDUCATION/TRAINING PROGRAM

## 2020-01-01 PROCEDURE — 6360000002 HC RX W HCPCS

## 2020-01-01 PROCEDURE — 2060000000 HC ICU INTERMEDIATE R&B

## 2020-01-01 PROCEDURE — 6360000002 HC RX W HCPCS: Performed by: NURSE PRACTITIONER

## 2020-01-01 PROCEDURE — 85027 COMPLETE CBC AUTOMATED: CPT

## 2020-01-01 PROCEDURE — 2700000000 HC OXYGEN THERAPY PER DAY

## 2020-01-01 PROCEDURE — 6370000000 HC RX 637 (ALT 250 FOR IP): Performed by: INTERNAL MEDICINE

## 2020-01-01 PROCEDURE — 87077 CULTURE AEROBIC IDENTIFY: CPT

## 2020-01-01 PROCEDURE — 94640 AIRWAY INHALATION TREATMENT: CPT

## 2020-01-01 PROCEDURE — 99285 EMERGENCY DEPT VISIT HI MDM: CPT

## 2020-01-01 PROCEDURE — 96374 THER/PROPH/DIAG INJ IV PUSH: CPT

## 2020-01-01 PROCEDURE — 82948 REAGENT STRIP/BLOOD GLUCOSE: CPT

## 2020-01-01 PROCEDURE — 74178 CT ABD&PLV WO CNTR FLWD CNTR: CPT

## 2020-01-01 PROCEDURE — G0480 DRUG TEST DEF 1-7 CLASSES: HCPCS

## 2020-01-01 PROCEDURE — 51702 INSERT TEMP BLADDER CATH: CPT

## 2020-01-01 PROCEDURE — 84484 ASSAY OF TROPONIN QUANT: CPT

## 2020-01-01 PROCEDURE — 6360000002 HC RX W HCPCS: Performed by: PSYCHIATRY & NEUROLOGY

## 2020-01-01 PROCEDURE — 94761 N-INVAS EAR/PLS OXIMETRY MLT: CPT

## 2020-01-01 PROCEDURE — 77334 RADIATION TREATMENT AID(S): CPT | Performed by: RADIOLOGY

## 2020-01-01 PROCEDURE — 99232 SBSQ HOSP IP/OBS MODERATE 35: CPT | Performed by: FAMILY MEDICINE

## 2020-01-01 PROCEDURE — 80164 ASSAY DIPROPYLACETIC ACD TOT: CPT

## 2020-01-01 PROCEDURE — 99292 CRITICAL CARE ADDL 30 MIN: CPT | Performed by: INTERNAL MEDICINE

## 2020-01-01 PROCEDURE — 2580000003 HC RX 258: Performed by: INTERNAL MEDICINE

## 2020-01-01 PROCEDURE — 2580000003 HC RX 258: Performed by: PSYCHIATRY & NEUROLOGY

## 2020-01-01 PROCEDURE — 87186 SC STD MICRODIL/AGAR DIL: CPT

## 2020-01-01 PROCEDURE — 4004F PT TOBACCO SCREEN RCVD TLK: CPT | Performed by: INTERNAL MEDICINE

## 2020-01-01 PROCEDURE — 02HV33Z INSERTION OF INFUSION DEVICE INTO SUPERIOR VENA CAVA, PERCUTANEOUS APPROACH: ICD-10-PCS | Performed by: INTERNAL MEDICINE

## 2020-01-01 PROCEDURE — 2000000000 HC ICU R&B

## 2020-01-01 PROCEDURE — 1200000003 HC TELEMETRY R&B

## 2020-01-01 PROCEDURE — 83690 ASSAY OF LIPASE: CPT

## 2020-01-01 PROCEDURE — 97116 GAIT TRAINING THERAPY: CPT

## 2020-01-01 PROCEDURE — 2500000003 HC RX 250 WO HCPCS: Performed by: PSYCHIATRY & NEUROLOGY

## 2020-01-01 PROCEDURE — G8427 DOCREV CUR MEDS BY ELIG CLIN: HCPCS | Performed by: INTERNAL MEDICINE

## 2020-01-01 PROCEDURE — 80048 BASIC METABOLIC PNL TOTAL CA: CPT

## 2020-01-01 PROCEDURE — 71045 X-RAY EXAM CHEST 1 VIEW: CPT

## 2020-01-01 PROCEDURE — 2500000003 HC RX 250 WO HCPCS: Performed by: NURSE PRACTITIONER

## 2020-01-01 PROCEDURE — 99215 OFFICE O/P EST HI 40 MIN: CPT | Performed by: INTERNAL MEDICINE

## 2020-01-01 PROCEDURE — 6360000004 HC RX CONTRAST MEDICATION: Performed by: INTERNAL MEDICINE

## 2020-01-01 PROCEDURE — 1200000000 HC SEMI PRIVATE

## 2020-01-01 PROCEDURE — 97110 THERAPEUTIC EXERCISES: CPT

## 2020-01-01 PROCEDURE — 97530 THERAPEUTIC ACTIVITIES: CPT

## 2020-01-01 PROCEDURE — 99254 IP/OBS CNSLTJ NEW/EST MOD 60: CPT | Performed by: NURSE PRACTITIONER

## 2020-01-01 PROCEDURE — 99215 OFFICE O/P EST HI 40 MIN: CPT | Performed by: PSYCHIATRY & NEUROLOGY

## 2020-01-01 PROCEDURE — G8427 DOCREV CUR MEDS BY ELIG CLIN: HCPCS | Performed by: NEUROLOGICAL SURGERY

## 2020-01-01 PROCEDURE — 97163 PT EVAL HIGH COMPLEX 45 MIN: CPT

## 2020-01-01 PROCEDURE — 2709999900 HC NON-CHARGEABLE SUPPLY

## 2020-01-01 PROCEDURE — 36556 INSERT NON-TUNNEL CV CATH: CPT

## 2020-01-01 PROCEDURE — C9254 INJECTION, LACOSAMIDE: HCPCS | Performed by: PSYCHIATRY & NEUROLOGY

## 2020-01-01 PROCEDURE — 99239 HOSP IP/OBS DSCHRG MGMT >30: CPT | Performed by: INTERNAL MEDICINE

## 2020-01-01 PROCEDURE — 94003 VENT MGMT INPAT SUBQ DAY: CPT

## 2020-01-01 PROCEDURE — A9579 GAD-BASE MR CONTRAST NOS,1ML: HCPCS | Performed by: INTERNAL MEDICINE

## 2020-01-01 PROCEDURE — 99214 OFFICE O/P EST MOD 30 MIN: CPT | Performed by: INTERNAL MEDICINE

## 2020-01-01 PROCEDURE — 70450 CT HEAD/BRAIN W/O DYE: CPT

## 2020-01-01 PROCEDURE — 99233 SBSQ HOSP IP/OBS HIGH 50: CPT | Performed by: INTERNAL MEDICINE

## 2020-01-01 PROCEDURE — 80307 DRUG TEST PRSMV CHEM ANLYZR: CPT

## 2020-01-01 PROCEDURE — 82550 ASSAY OF CK (CPK): CPT

## 2020-01-01 PROCEDURE — 6360000002 HC RX W HCPCS: Performed by: HOSPITALIST

## 2020-01-01 PROCEDURE — 85025 COMPLETE CBC W/AUTO DIFF WBC: CPT

## 2020-01-01 PROCEDURE — 2500000003 HC RX 250 WO HCPCS: Performed by: PHYSICIAN ASSISTANT

## 2020-01-01 PROCEDURE — 1111F DSCHRG MED/CURRENT MED MERGE: CPT | Performed by: INTERNAL MEDICINE

## 2020-01-01 PROCEDURE — 87205 SMEAR GRAM STAIN: CPT

## 2020-01-01 PROCEDURE — 99232 SBSQ HOSP IP/OBS MODERATE 35: CPT | Performed by: INTERNAL MEDICINE

## 2020-01-01 PROCEDURE — APPSS180 APP SPLIT SHARED TIME > 60 MINUTES: Performed by: NURSE PRACTITIONER

## 2020-01-01 PROCEDURE — 87081 CULTURE SCREEN ONLY: CPT

## 2020-01-01 PROCEDURE — 84520 ASSAY OF UREA NITROGEN: CPT

## 2020-01-01 PROCEDURE — 80053 COMPREHEN METABOLIC PANEL: CPT

## 2020-01-01 PROCEDURE — 84295 ASSAY OF SERUM SODIUM: CPT

## 2020-01-01 PROCEDURE — G8427 DOCREV CUR MEDS BY ELIG CLIN: HCPCS | Performed by: PSYCHIATRY & NEUROLOGY

## 2020-01-01 PROCEDURE — 2500000003 HC RX 250 WO HCPCS: Performed by: FAMILY MEDICINE

## 2020-01-01 PROCEDURE — 6360000004 HC RX CONTRAST MEDICATION: Performed by: PHYSICIAN ASSISTANT

## 2020-01-01 PROCEDURE — 82248 BILIRUBIN DIRECT: CPT

## 2020-01-01 PROCEDURE — 94760 N-INVAS EAR/PLS OXIMETRY 1: CPT

## 2020-01-01 PROCEDURE — 99215 OFFICE O/P EST HI 40 MIN: CPT | Performed by: NEUROLOGICAL SURGERY

## 2020-01-01 PROCEDURE — 2580000003 HC RX 258: Performed by: NURSE PRACTITIONER

## 2020-01-01 PROCEDURE — 99222 1ST HOSP IP/OBS MODERATE 55: CPT | Performed by: PSYCHIATRY & NEUROLOGY

## 2020-01-01 PROCEDURE — 82374 ASSAY BLOOD CARBON DIOXIDE: CPT

## 2020-01-01 PROCEDURE — 77417 THER RADIOLOGY PORT IMAGE(S): CPT | Performed by: RADIOLOGY

## 2020-01-01 PROCEDURE — 94770 HC ETCO2 MONITOR DAILY: CPT

## 2020-01-01 PROCEDURE — 99212 OFFICE O/P EST SF 10 MIN: CPT | Performed by: NURSE PRACTITIONER

## 2020-01-01 PROCEDURE — 0BH17EZ INSERTION OF ENDOTRACHEAL AIRWAY INTO TRACHEA, VIA NATURAL OR ARTIFICIAL OPENING: ICD-10-PCS | Performed by: INTERNAL MEDICINE

## 2020-01-01 PROCEDURE — 4004F PT TOBACCO SCREEN RCVD TLK: CPT | Performed by: STUDENT IN AN ORGANIZED HEALTH CARE EDUCATION/TRAINING PROGRAM

## 2020-01-01 PROCEDURE — 2500000003 HC RX 250 WO HCPCS

## 2020-01-01 PROCEDURE — 6360000002 HC RX W HCPCS: Performed by: FAMILY MEDICINE

## 2020-01-01 PROCEDURE — 85610 PROTHROMBIN TIME: CPT

## 2020-01-01 PROCEDURE — 89220 SPUTUM SPECIMEN COLLECTION: CPT

## 2020-01-01 PROCEDURE — 95819 EEG AWAKE AND ASLEEP: CPT

## 2020-01-01 PROCEDURE — C9113 INJ PANTOPRAZOLE SODIUM, VIA: HCPCS | Performed by: NURSE PRACTITIONER

## 2020-01-01 PROCEDURE — 83735 ASSAY OF MAGNESIUM: CPT

## 2020-01-01 PROCEDURE — 70496 CT ANGIOGRAPHY HEAD: CPT

## 2020-01-01 PROCEDURE — 6360000004 HC RX CONTRAST MEDICATION: Performed by: NURSE PRACTITIONER

## 2020-01-01 PROCEDURE — A9579 GAD-BASE MR CONTRAST NOS,1ML: HCPCS | Performed by: PHYSICIAN ASSISTANT

## 2020-01-01 PROCEDURE — 84145 PROCALCITONIN (PCT): CPT

## 2020-01-01 PROCEDURE — 90792 PSYCH DIAG EVAL W/MED SRVCS: CPT | Performed by: PSYCHIATRY & NEUROLOGY

## 2020-01-01 PROCEDURE — 99232 SBSQ HOSP IP/OBS MODERATE 35: CPT | Performed by: NURSE PRACTITIONER

## 2020-01-01 PROCEDURE — 96365 THER/PROPH/DIAG IV INF INIT: CPT

## 2020-01-01 PROCEDURE — 99211 OFF/OP EST MAY X REQ PHY/QHP: CPT

## 2020-01-01 PROCEDURE — 99291 CRITICAL CARE FIRST HOUR: CPT | Performed by: INTERNAL MEDICINE

## 2020-01-01 PROCEDURE — 87500 VANOMYCIN DNA AMP PROBE: CPT

## 2020-01-01 PROCEDURE — 99232 SBSQ HOSP IP/OBS MODERATE 35: CPT | Performed by: NEUROLOGICAL SURGERY

## 2020-01-01 PROCEDURE — 96160 PT-FOCUSED HLTH RISK ASSMT: CPT | Performed by: STUDENT IN AN ORGANIZED HEALTH CARE EDUCATION/TRAINING PROGRAM

## 2020-01-01 PROCEDURE — G8419 CALC BMI OUT NRM PARAM NOF/U: HCPCS | Performed by: INTERNAL MEDICINE

## 2020-01-01 PROCEDURE — 81003 URINALYSIS AUTO W/O SCOPE: CPT

## 2020-01-01 PROCEDURE — 80047 BASIC METABLC PNL IONIZED CA: CPT

## 2020-01-01 PROCEDURE — 80076 HEPATIC FUNCTION PANEL: CPT

## 2020-01-01 PROCEDURE — 99232 SBSQ HOSP IP/OBS MODERATE 35: CPT | Performed by: PSYCHIATRY & NEUROLOGY

## 2020-01-01 PROCEDURE — 36592 COLLECT BLOOD FROM PICC: CPT

## 2020-01-01 PROCEDURE — 83036 HEMOGLOBIN GLYCOSYLATED A1C: CPT

## 2020-01-01 PROCEDURE — 82803 BLOOD GASES ANY COMBINATION: CPT

## 2020-01-01 PROCEDURE — 95816 EEG AWAKE AND DROWSY: CPT | Performed by: PSYCHIATRY & NEUROLOGY

## 2020-01-01 PROCEDURE — 96376 TX/PRO/DX INJ SAME DRUG ADON: CPT

## 2020-01-01 PROCEDURE — 77307 TELETHX ISODOSE PLAN CPLX: CPT | Performed by: RADIOLOGY

## 2020-01-01 PROCEDURE — 5A1945Z RESPIRATORY VENTILATION, 24-96 CONSECUTIVE HOURS: ICD-10-PCS | Performed by: INTERNAL MEDICINE

## 2020-01-01 PROCEDURE — 3209999900 CT GUIDE RADIATION THERAPY NO CHARGE

## 2020-01-01 PROCEDURE — 31500 INSERT EMERGENCY AIRWAY: CPT

## 2020-01-01 PROCEDURE — 99223 1ST HOSP IP/OBS HIGH 75: CPT | Performed by: INTERNAL MEDICINE

## 2020-01-01 PROCEDURE — 31500 INSERT EMERGENCY AIRWAY: CPT | Performed by: INTERNAL MEDICINE

## 2020-01-01 PROCEDURE — 82140 ASSAY OF AMMONIA: CPT

## 2020-01-01 PROCEDURE — 36600 WITHDRAWAL OF ARTERIAL BLOOD: CPT

## 2020-01-01 PROCEDURE — 2580000003 HC RX 258: Performed by: FAMILY MEDICINE

## 2020-01-01 PROCEDURE — 36556 INSERT NON-TUNNEL CV CATH: CPT | Performed by: NURSE PRACTITIONER

## 2020-01-01 PROCEDURE — 87641 MR-STAPH DNA AMP PROBE: CPT

## 2020-01-01 PROCEDURE — 99253 IP/OBS CNSLTJ NEW/EST LOW 45: CPT | Performed by: PSYCHIATRY & NEUROLOGY

## 2020-01-01 PROCEDURE — G8419 CALC BMI OUT NRM PARAM NOF/U: HCPCS | Performed by: STUDENT IN AN ORGANIZED HEALTH CARE EDUCATION/TRAINING PROGRAM

## 2020-01-01 PROCEDURE — 99291 CRITICAL CARE FIRST HOUR: CPT | Performed by: NEUROLOGICAL SURGERY

## 2020-01-01 PROCEDURE — 77336 RADIATION PHYSICS CONSULT: CPT | Performed by: RADIOLOGY

## 2020-01-01 PROCEDURE — 70553 MRI BRAIN STEM W/O & W/DYE: CPT

## 2020-01-01 PROCEDURE — 81001 URINALYSIS AUTO W/SCOPE: CPT

## 2020-01-01 PROCEDURE — G8484 FLU IMMUNIZE NO ADMIN: HCPCS | Performed by: STUDENT IN AN ORGANIZED HEALTH CARE EDUCATION/TRAINING PROGRAM

## 2020-01-01 PROCEDURE — 4004F PT TOBACCO SCREEN RCVD TLK: CPT | Performed by: NEUROLOGICAL SURGERY

## 2020-01-01 PROCEDURE — 99222 1ST HOSP IP/OBS MODERATE 55: CPT | Performed by: PHYSICIAN ASSISTANT

## 2020-01-01 PROCEDURE — 2580000003 HC RX 258: Performed by: EMERGENCY MEDICINE

## 2020-01-01 PROCEDURE — 99204 OFFICE O/P NEW MOD 45 MIN: CPT | Performed by: STUDENT IN AN ORGANIZED HEALTH CARE EDUCATION/TRAINING PROGRAM

## 2020-01-01 PROCEDURE — G8484 FLU IMMUNIZE NO ADMIN: HCPCS | Performed by: INTERNAL MEDICINE

## 2020-01-01 PROCEDURE — 6360000002 HC RX W HCPCS: Performed by: EMERGENCY MEDICINE

## 2020-01-01 PROCEDURE — 2580000003 HC RX 258: Performed by: PHYSICIAN ASSISTANT

## 2020-01-01 PROCEDURE — 0BH18EZ INSERTION OF ENDOTRACHEAL AIRWAY INTO TRACHEA, VIA NATURAL OR ARTIFICIAL OPENING ENDOSCOPIC: ICD-10-PCS | Performed by: INTERNAL MEDICINE

## 2020-01-01 PROCEDURE — 31500 INSERT EMERGENCY AIRWAY: CPT | Performed by: NURSE PRACTITIONER

## 2020-01-01 PROCEDURE — 6360000002 HC RX W HCPCS: Performed by: PHYSICIAN ASSISTANT

## 2020-01-01 PROCEDURE — C9113 INJ PANTOPRAZOLE SODIUM, VIA: HCPCS | Performed by: FAMILY MEDICINE

## 2020-01-01 PROCEDURE — 87070 CULTURE OTHR SPECIMN AEROBIC: CPT

## 2020-01-01 PROCEDURE — 1111F DSCHRG MED/CURRENT MED MERGE: CPT | Performed by: STUDENT IN AN ORGANIZED HEALTH CARE EDUCATION/TRAINING PROGRAM

## 2020-01-01 PROCEDURE — 77290 THER RAD SIMULAJ FIELD CPLX: CPT | Performed by: RADIOLOGY

## 2020-01-01 PROCEDURE — G8419 CALC BMI OUT NRM PARAM NOF/U: HCPCS | Performed by: NEUROLOGICAL SURGERY

## 2020-01-01 PROCEDURE — 82800 BLOOD PH: CPT

## 2020-01-01 PROCEDURE — 0100U HC RESPIRPTHGN MULT REV TRANS & AMP PRB TECH 21 TRGT: CPT

## 2020-01-01 PROCEDURE — 36556 INSERT NON-TUNNEL CV CATH: CPT | Performed by: INTERNAL MEDICINE

## 2020-01-01 PROCEDURE — 97535 SELF CARE MNGMENT TRAINING: CPT

## 2020-01-01 PROCEDURE — C1751 CATH, INF, PER/CENT/MIDLINE: HCPCS

## 2020-01-01 PROCEDURE — 96375 TX/PRO/DX INJ NEW DRUG ADDON: CPT

## 2020-01-01 PROCEDURE — 93005 ELECTROCARDIOGRAM TRACING: CPT | Performed by: PHYSICIAN ASSISTANT

## 2020-01-01 PROCEDURE — 99238 HOSP IP/OBS DSCHRG MGMT 30/<: CPT | Performed by: INTERNAL MEDICINE

## 2020-01-01 PROCEDURE — 84443 ASSAY THYROID STIM HORMONE: CPT

## 2020-01-01 PROCEDURE — 71260 CT THORAX DX C+: CPT

## 2020-01-01 PROCEDURE — 70498 CT ANGIOGRAPHY NECK: CPT

## 2020-01-01 PROCEDURE — 99239 HOSP IP/OBS DSCHRG MGMT >30: CPT | Performed by: HOSPITALIST

## 2020-01-01 RX ORDER — METOCLOPRAMIDE HYDROCHLORIDE 5 MG/ML
10 INJECTION INTRAMUSCULAR; INTRAVENOUS ONCE
Status: COMPLETED | OUTPATIENT
Start: 2020-01-01 | End: 2020-01-01

## 2020-01-01 RX ORDER — PROPOFOL 10 MG/ML
10 INJECTION, EMULSION INTRAVENOUS
Status: DISCONTINUED | OUTPATIENT
Start: 2020-01-01 | End: 2020-01-01

## 2020-01-01 RX ORDER — MULTIVITAMIN WITH FOLIC ACID 400 MCG
1 TABLET ORAL DAILY
Status: DISCONTINUED | OUTPATIENT
Start: 2020-01-01 | End: 2020-01-01 | Stop reason: HOSPADM

## 2020-01-01 RX ORDER — LORAZEPAM 2 MG/ML
1 INJECTION INTRAMUSCULAR ONCE
Status: COMPLETED | OUTPATIENT
Start: 2020-01-01 | End: 2020-01-01

## 2020-01-01 RX ORDER — LEVETIRACETAM 500 MG/1
500 TABLET ORAL EVERY 12 HOURS
Status: DISCONTINUED | OUTPATIENT
Start: 2020-01-01 | End: 2020-01-01

## 2020-01-01 RX ORDER — DEXAMETHASONE SODIUM PHOSPHATE 4 MG/ML
10 INJECTION, SOLUTION INTRA-ARTICULAR; INTRALESIONAL; INTRAMUSCULAR; INTRAVENOUS; SOFT TISSUE ONCE
Status: COMPLETED | OUTPATIENT
Start: 2020-01-01 | End: 2020-01-01

## 2020-01-01 RX ORDER — LORAZEPAM 2 MG/ML
2 INJECTION INTRAMUSCULAR EVERY 4 HOURS PRN
Status: DISCONTINUED | OUTPATIENT
Start: 2020-01-01 | End: 2020-01-01

## 2020-01-01 RX ORDER — NICOTINE POLACRILEX 4 MG
15 LOZENGE BUCCAL PRN
Status: DISCONTINUED | OUTPATIENT
Start: 2020-01-01 | End: 2020-01-01 | Stop reason: HOSPADM

## 2020-01-01 RX ORDER — PANTOPRAZOLE SODIUM 40 MG/1
40 TABLET, DELAYED RELEASE ORAL DAILY
Status: DISCONTINUED | OUTPATIENT
Start: 2020-01-01 | End: 2020-01-01 | Stop reason: SDUPTHER

## 2020-01-01 RX ORDER — MULTIVITAMIN WITH IRON
1 TABLET ORAL DAILY
Status: DISCONTINUED | OUTPATIENT
Start: 2020-01-01 | End: 2020-01-01

## 2020-01-01 RX ORDER — DEXAMETHASONE 4 MG/1
4 TABLET ORAL EVERY 8 HOURS SCHEDULED
Status: DISCONTINUED | OUTPATIENT
Start: 2020-01-01 | End: 2020-01-01 | Stop reason: HOSPADM

## 2020-01-01 RX ORDER — GLYCOPYRROLATE 1 MG/5 ML
0.2 SYRINGE (ML) INTRAVENOUS EVERY 4 HOURS PRN
Status: DISCONTINUED | OUTPATIENT
Start: 2020-01-01 | End: 2020-01-01 | Stop reason: HOSPADM

## 2020-01-01 RX ORDER — 0.9 % SODIUM CHLORIDE 0.9 %
2000 INTRAVENOUS SOLUTION INTRAVENOUS ONCE
Status: COMPLETED | OUTPATIENT
Start: 2020-01-01 | End: 2020-01-01

## 2020-01-01 RX ORDER — THIAMINE MONONITRATE (VIT B1) 100 MG
100 TABLET ORAL DAILY
COMMUNITY

## 2020-01-01 RX ORDER — PANTOPRAZOLE SODIUM 40 MG/1
40 TABLET, DELAYED RELEASE ORAL 2 TIMES DAILY
Status: DISCONTINUED | OUTPATIENT
Start: 2020-01-01 | End: 2020-01-01

## 2020-01-01 RX ORDER — DEXAMETHASONE SODIUM PHOSPHATE 4 MG/ML
4 INJECTION, SOLUTION INTRA-ARTICULAR; INTRALESIONAL; INTRAMUSCULAR; INTRAVENOUS; SOFT TISSUE EVERY 6 HOURS
Status: CANCELLED | OUTPATIENT
Start: 2020-01-01

## 2020-01-01 RX ORDER — AMOXICILLIN AND CLAVULANATE POTASSIUM 875; 125 MG/1; MG/1
1 TABLET, FILM COATED ORAL 2 TIMES DAILY
Qty: 20 TABLET | Refills: 0 | Status: SHIPPED | OUTPATIENT
Start: 2020-01-01 | End: 2020-01-01

## 2020-01-01 RX ORDER — ALBUTEROL SULFATE 2.5 MG/3ML
5 SOLUTION RESPIRATORY (INHALATION) EVERY 4 HOURS PRN
Status: CANCELLED | OUTPATIENT
Start: 2020-01-01

## 2020-01-01 RX ORDER — ROCURONIUM BROMIDE 10 MG/ML
100 INJECTION, SOLUTION INTRAVENOUS ONCE
Status: COMPLETED | OUTPATIENT
Start: 2020-01-01 | End: 2020-01-01

## 2020-01-01 RX ORDER — SODIUM CHLORIDE 0.9 % (FLUSH) 0.9 %
10 SYRINGE (ML) INJECTION EVERY 12 HOURS SCHEDULED
Status: DISCONTINUED | OUTPATIENT
Start: 2020-01-01 | End: 2020-01-01 | Stop reason: HOSPADM

## 2020-01-01 RX ORDER — ACETAMINOPHEN 650 MG/1
650 SUPPOSITORY RECTAL EVERY 4 HOURS PRN
Status: DISCONTINUED | OUTPATIENT
Start: 2020-01-01 | End: 2020-01-01 | Stop reason: HOSPADM

## 2020-01-01 RX ORDER — PROPOFOL 10 MG/ML
INJECTION, EMULSION INTRAVENOUS
Status: COMPLETED | OUTPATIENT
Start: 2020-01-01 | End: 2020-01-01

## 2020-01-01 RX ORDER — LACOSAMIDE 50 MG/1
50 TABLET ORAL 2 TIMES DAILY
Qty: 60 TABLET | Refills: 3 | Status: SHIPPED | OUTPATIENT
Start: 2020-01-01 | End: 2020-09-05

## 2020-01-01 RX ORDER — QUETIAPINE FUMARATE 25 MG/1
50 TABLET, FILM COATED ORAL 2 TIMES DAILY
Status: DISCONTINUED | OUTPATIENT
Start: 2020-01-01 | End: 2020-01-01 | Stop reason: HOSPADM

## 2020-01-01 RX ORDER — DEXAMETHASONE 4 MG/1
4 TABLET ORAL EVERY 8 HOURS SCHEDULED
Status: COMPLETED | OUTPATIENT
Start: 2020-01-01 | End: 2020-01-01

## 2020-01-01 RX ORDER — LORAZEPAM 2 MG/ML
2 INJECTION INTRAMUSCULAR
Status: DISCONTINUED | OUTPATIENT
Start: 2020-01-01 | End: 2020-01-01 | Stop reason: HOSPADM

## 2020-01-01 RX ORDER — LISINOPRIL 10 MG/1
10 TABLET ORAL DAILY
Status: DISCONTINUED | OUTPATIENT
Start: 2020-01-01 | End: 2020-01-01 | Stop reason: HOSPADM

## 2020-01-01 RX ORDER — PANTOPRAZOLE SODIUM 40 MG/10ML
40 INJECTION, POWDER, LYOPHILIZED, FOR SOLUTION INTRAVENOUS 2 TIMES DAILY
Status: DISCONTINUED | OUTPATIENT
Start: 2020-01-01 | End: 2020-01-01

## 2020-01-01 RX ORDER — ALBUTEROL SULFATE 2.5 MG/3ML
5 SOLUTION RESPIRATORY (INHALATION) EVERY 4 HOURS PRN
Status: DISCONTINUED | OUTPATIENT
Start: 2020-01-01 | End: 2020-01-01 | Stop reason: HOSPADM

## 2020-01-01 RX ORDER — MORPHINE SULFATE/0.9% NACL/PF 1 MG/ML
SYRINGE (ML) INJECTION CONTINUOUS
Status: DISCONTINUED | OUTPATIENT
Start: 2020-01-01 | End: 2020-01-01 | Stop reason: HOSPADM

## 2020-01-01 RX ORDER — KETAMINE HCL IN NACL, ISO-OSM 100MG/10ML
SYRINGE (ML) INJECTION
Status: COMPLETED | OUTPATIENT
Start: 2020-01-01 | End: 2020-01-01

## 2020-01-01 RX ORDER — KETAMINE HCL IN NACL, ISO-OSM 100MG/10ML
SYRINGE (ML) INJECTION
Status: DISPENSED
Start: 2020-01-01 | End: 2020-01-01

## 2020-01-01 RX ORDER — DIVALPROEX SODIUM 500 MG/1
500 TABLET, DELAYED RELEASE ORAL 2 TIMES DAILY
Qty: 90 TABLET | Refills: 3 | Status: SHIPPED | OUTPATIENT
Start: 2020-01-01 | End: 2020-01-01

## 2020-01-01 RX ORDER — SODIUM CHLORIDE 0.9 % (FLUSH) 0.9 %
10 SYRINGE (ML) INJECTION PRN
Status: DISCONTINUED | OUTPATIENT
Start: 2020-01-01 | End: 2020-01-01 | Stop reason: HOSPADM

## 2020-01-01 RX ORDER — FOLIC ACID 1 MG/1
1 TABLET ORAL DAILY
Status: DISCONTINUED | OUTPATIENT
Start: 2020-01-01 | End: 2020-01-01 | Stop reason: HOSPADM

## 2020-01-01 RX ORDER — PANTOPRAZOLE SODIUM 40 MG/1
40 TABLET, DELAYED RELEASE ORAL
Status: DISCONTINUED | OUTPATIENT
Start: 2020-01-01 | End: 2020-01-01 | Stop reason: HOSPADM

## 2020-01-01 RX ORDER — PANTOPRAZOLE SODIUM 40 MG/1
40 TABLET, DELAYED RELEASE ORAL
Status: DISCONTINUED | OUTPATIENT
Start: 2020-01-01 | End: 2020-01-01

## 2020-01-01 RX ORDER — GLYCOPYRROLATE 0.2 MG/ML
0.1 INJECTION INTRAMUSCULAR; INTRAVENOUS
Status: DISCONTINUED | OUTPATIENT
Start: 2020-01-01 | End: 2020-01-01 | Stop reason: HOSPADM

## 2020-01-01 RX ORDER — LORAZEPAM 2 MG/ML
INJECTION INTRAMUSCULAR
Status: COMPLETED
Start: 2020-01-01 | End: 2020-01-01

## 2020-01-01 RX ORDER — LISINOPRIL 5 MG/1
5 TABLET ORAL DAILY
Status: DISCONTINUED | OUTPATIENT
Start: 2020-01-01 | End: 2020-01-01

## 2020-01-01 RX ORDER — PROPOFOL 10 MG/ML
20 INJECTION, EMULSION INTRAVENOUS
Status: DISCONTINUED | OUTPATIENT
Start: 2020-01-01 | End: 2020-01-01 | Stop reason: CLARIF

## 2020-01-01 RX ORDER — FOLIC ACID 1 MG/1
1 TABLET ORAL DAILY
Status: DISCONTINUED | OUTPATIENT
Start: 2020-01-01 | End: 2020-01-01

## 2020-01-01 RX ORDER — DIPHENHYDRAMINE HYDROCHLORIDE 50 MG/ML
50 INJECTION INTRAMUSCULAR; INTRAVENOUS ONCE
Status: COMPLETED | OUTPATIENT
Start: 2020-01-01 | End: 2020-01-01

## 2020-01-01 RX ORDER — LORAZEPAM 0.5 MG/1
0.5 TABLET ORAL EVERY 4 HOURS PRN
Status: DISCONTINUED | OUTPATIENT
Start: 2020-01-01 | End: 2020-01-01 | Stop reason: HOSPADM

## 2020-01-01 RX ORDER — HALOPERIDOL 5 MG/ML
1 INJECTION INTRAMUSCULAR EVERY 6 HOURS PRN
Status: DISCONTINUED | OUTPATIENT
Start: 2020-01-01 | End: 2020-01-01 | Stop reason: HOSPADM

## 2020-01-01 RX ORDER — LISINOPRIL 5 MG/1
5 TABLET ORAL ONCE
Status: COMPLETED | OUTPATIENT
Start: 2020-01-01 | End: 2020-01-01

## 2020-01-01 RX ORDER — LISINOPRIL 10 MG/1
10 TABLET ORAL DAILY
Qty: 30 TABLET | Refills: 3 | Status: SHIPPED | OUTPATIENT
Start: 2020-01-01

## 2020-01-01 RX ORDER — CHLORHEXIDINE GLUCONATE 0.12 MG/ML
15 RINSE ORAL 2 TIMES DAILY
Status: DISCONTINUED | OUTPATIENT
Start: 2020-01-01 | End: 2020-01-01

## 2020-01-01 RX ORDER — PROPOFOL 10 MG/ML
INJECTION, EMULSION INTRAVENOUS
Status: COMPLETED
Start: 2020-01-01 | End: 2020-01-01

## 2020-01-01 RX ORDER — LABETALOL 20 MG/4 ML (5 MG/ML) INTRAVENOUS SYRINGE
5 EVERY 6 HOURS PRN
Status: DISCONTINUED | OUTPATIENT
Start: 2020-01-01 | End: 2020-01-01

## 2020-01-01 RX ORDER — LORAZEPAM 2 MG/ML
2 INJECTION INTRAMUSCULAR
Status: CANCELLED | OUTPATIENT
Start: 2020-01-01

## 2020-01-01 RX ORDER — THIAMINE MONONITRATE (VIT B1) 100 MG
100 TABLET ORAL DAILY
Status: DISCONTINUED | OUTPATIENT
Start: 2020-01-01 | End: 2020-01-01

## 2020-01-01 RX ORDER — ZIPRASIDONE MESYLATE 20 MG/ML
15 INJECTION, POWDER, LYOPHILIZED, FOR SOLUTION INTRAMUSCULAR ONCE
Status: COMPLETED | OUTPATIENT
Start: 2020-01-01 | End: 2020-01-01

## 2020-01-01 RX ORDER — FENTANYL CITRATE 50 UG/ML
25 INJECTION, SOLUTION INTRAMUSCULAR; INTRAVENOUS
Status: DISCONTINUED | OUTPATIENT
Start: 2020-01-01 | End: 2020-01-01

## 2020-01-01 RX ORDER — FAMOTIDINE 20 MG/1
20 TABLET, FILM COATED ORAL 2 TIMES DAILY
Status: DISCONTINUED | OUTPATIENT
Start: 2020-01-01 | End: 2020-01-01

## 2020-01-01 RX ORDER — DEXTROSE MONOHYDRATE 50 MG/ML
100 INJECTION, SOLUTION INTRAVENOUS PRN
Status: DISCONTINUED | OUTPATIENT
Start: 2020-01-01 | End: 2020-01-01 | Stop reason: HOSPADM

## 2020-01-01 RX ORDER — 3% SODIUM CHLORIDE 3 G/100ML
25 INJECTION, SOLUTION INTRAVENOUS CONTINUOUS
Status: DISPENSED | OUTPATIENT
Start: 2020-01-01 | End: 2020-01-01

## 2020-01-01 RX ORDER — HALOPERIDOL 5 MG/ML
1 INJECTION INTRAMUSCULAR EVERY 6 HOURS PRN
Status: DISCONTINUED | OUTPATIENT
Start: 2020-01-01 | End: 2020-01-01

## 2020-01-01 RX ORDER — PANTOPRAZOLE SODIUM 40 MG/1
40 TABLET, DELAYED RELEASE ORAL DAILY
COMMUNITY

## 2020-01-01 RX ORDER — DOCUSATE SODIUM 100 MG/1
100 CAPSULE, LIQUID FILLED ORAL 2 TIMES DAILY
Qty: 60 CAPSULE | Refills: 3 | Status: CANCELLED | OUTPATIENT
Start: 2020-01-01

## 2020-01-01 RX ORDER — DEXAMETHASONE SODIUM PHOSPHATE 4 MG/ML
4 INJECTION, SOLUTION INTRA-ARTICULAR; INTRALESIONAL; INTRAMUSCULAR; INTRAVENOUS; SOFT TISSUE EVERY 6 HOURS
Status: DISCONTINUED | OUTPATIENT
Start: 2020-01-01 | End: 2020-01-01 | Stop reason: HOSPADM

## 2020-01-01 RX ORDER — MORPHINE SULFATE 2 MG/ML
2 INJECTION, SOLUTION INTRAMUSCULAR; INTRAVENOUS
Status: DISCONTINUED | OUTPATIENT
Start: 2020-01-01 | End: 2020-01-01 | Stop reason: HOSPADM

## 2020-01-01 RX ORDER — DEXTROSE MONOHYDRATE 25 G/50ML
12.5 INJECTION, SOLUTION INTRAVENOUS PRN
Status: DISCONTINUED | OUTPATIENT
Start: 2020-01-01 | End: 2020-01-01 | Stop reason: HOSPADM

## 2020-01-01 RX ORDER — IPRATROPIUM BROMIDE AND ALBUTEROL SULFATE 2.5; .5 MG/3ML; MG/3ML
1 SOLUTION RESPIRATORY (INHALATION) EVERY 6 HOURS PRN
Status: DISCONTINUED | OUTPATIENT
Start: 2020-01-01 | End: 2020-01-01

## 2020-01-01 RX ORDER — PROPOFOL 10 MG/ML
50 INJECTION, EMULSION INTRAVENOUS
Status: DISCONTINUED | OUTPATIENT
Start: 2020-01-01 | End: 2020-01-01

## 2020-01-01 RX ORDER — PANTOPRAZOLE SODIUM 40 MG/1
40 TABLET, DELAYED RELEASE ORAL
Qty: 30 TABLET | Refills: 3 | Status: SHIPPED | OUTPATIENT
Start: 2020-01-01 | End: 2020-01-01 | Stop reason: ALTCHOICE

## 2020-01-01 RX ORDER — 0.9 % SODIUM CHLORIDE 0.9 %
1000 INTRAVENOUS SOLUTION INTRAVENOUS ONCE
Status: COMPLETED | OUTPATIENT
Start: 2020-01-01 | End: 2020-01-01

## 2020-01-01 RX ORDER — ACETAMINOPHEN 650 MG/1
650 SUPPOSITORY RECTAL EVERY 4 HOURS PRN
Status: CANCELLED | OUTPATIENT
Start: 2020-01-01

## 2020-01-01 RX ORDER — THIAMINE MONONITRATE (VIT B1) 100 MG
100 TABLET ORAL DAILY
Status: DISCONTINUED | OUTPATIENT
Start: 2020-01-01 | End: 2020-01-01 | Stop reason: HOSPADM

## 2020-01-01 RX ORDER — MULTIVITAMIN WITH FOLIC ACID 400 MCG
1 TABLET ORAL DAILY
Qty: 30 TABLET | Refills: 0 | Status: SHIPPED | OUTPATIENT
Start: 2020-01-01 | End: 2020-01-01 | Stop reason: SDUPTHER

## 2020-01-01 RX ORDER — DEXAMETHASONE 4 MG/1
4 TABLET ORAL EVERY 6 HOURS
Qty: 120 TABLET | Refills: 0 | Status: SHIPPED | OUTPATIENT
Start: 2020-01-01 | End: 2020-01-01

## 2020-01-01 RX ORDER — DEXAMETHASONE SODIUM PHOSPHATE 4 MG/ML
4 INJECTION, SOLUTION INTRA-ARTICULAR; INTRALESIONAL; INTRAMUSCULAR; INTRAVENOUS; SOFT TISSUE 3 TIMES DAILY
Status: DISCONTINUED | OUTPATIENT
Start: 2020-01-01 | End: 2020-01-01

## 2020-01-01 RX ORDER — MORPHINE SULFATE 2 MG/ML
2 INJECTION, SOLUTION INTRAMUSCULAR; INTRAVENOUS
Status: CANCELLED | OUTPATIENT
Start: 2020-01-01

## 2020-01-01 RX ORDER — VECURONIUM BROMIDE 1 MG/ML
10 INJECTION, POWDER, LYOPHILIZED, FOR SOLUTION INTRAVENOUS
Status: COMPLETED | OUTPATIENT
Start: 2020-01-01 | End: 2020-01-01

## 2020-01-01 RX ORDER — LORAZEPAM 2 MG/ML
4 INJECTION INTRAMUSCULAR EVERY 4 HOURS PRN
Status: DISCONTINUED | OUTPATIENT
Start: 2020-01-01 | End: 2020-01-01

## 2020-01-01 RX ORDER — LANOLIN ALCOHOL/MO/W.PET/CERES
100 CREAM (GRAM) TOPICAL DAILY
Qty: 30 TABLET | Refills: 3 | Status: SHIPPED | OUTPATIENT
Start: 2020-01-01 | End: 2020-01-01 | Stop reason: ALTCHOICE

## 2020-01-01 RX ORDER — PROPOFOL 10 MG/ML
INJECTION, EMULSION INTRAVENOUS
Status: DISCONTINUED
Start: 2020-01-01 | End: 2020-01-01

## 2020-01-01 RX ORDER — LACOSAMIDE 50 MG/1
50 TABLET ORAL 2 TIMES DAILY
Status: CANCELLED | OUTPATIENT
Start: 2020-01-01

## 2020-01-01 RX ORDER — SCOLOPAMINE TRANSDERMAL SYSTEM 1 MG/1
1 PATCH, EXTENDED RELEASE TRANSDERMAL
Status: DISCONTINUED | OUTPATIENT
Start: 2020-01-01 | End: 2020-01-01 | Stop reason: HOSPADM

## 2020-01-01 RX ORDER — DIVALPROEX SODIUM 500 MG/1
500 TABLET, DELAYED RELEASE ORAL 2 TIMES DAILY
Status: DISCONTINUED | OUTPATIENT
Start: 2020-01-01 | End: 2020-01-01 | Stop reason: HOSPADM

## 2020-01-01 RX ORDER — HALOPERIDOL 5 MG/ML
1 INJECTION INTRAMUSCULAR EVERY 6 HOURS PRN
Status: CANCELLED | OUTPATIENT
Start: 2020-01-01

## 2020-01-01 RX ORDER — LEVETIRACETAM 500 MG/1
1000 TABLET ORAL 2 TIMES DAILY
Status: DISCONTINUED | OUTPATIENT
Start: 2020-01-01 | End: 2020-01-01

## 2020-01-01 RX ORDER — DIVALPROEX SODIUM 250 MG/1
750 TABLET, EXTENDED RELEASE ORAL 2 TIMES DAILY
Qty: 180 TABLET | Refills: 3 | Status: SHIPPED | OUTPATIENT
Start: 2020-01-01

## 2020-01-01 RX ORDER — MULTIVITAMIN WITH FOLIC ACID 400 MCG
1 TABLET ORAL DAILY
Qty: 30 TABLET | Refills: 0 | Status: SHIPPED | OUTPATIENT
Start: 2020-01-01

## 2020-01-01 RX ORDER — DEXAMETHASONE 4 MG/1
4 TABLET ORAL EVERY 8 HOURS SCHEDULED
Qty: 42 TABLET | Refills: 0 | Status: SHIPPED | OUTPATIENT
Start: 2020-01-01 | End: 2020-01-01

## 2020-01-01 RX ORDER — LORAZEPAM 2 MG/ML
1 INJECTION INTRAMUSCULAR
Status: DISCONTINUED | OUTPATIENT
Start: 2020-01-01 | End: 2020-01-01

## 2020-01-01 RX ORDER — FOLIC ACID 1 MG/1
1 TABLET ORAL DAILY
Qty: 30 TABLET | Refills: 3 | Status: SHIPPED | OUTPATIENT
Start: 2020-01-01

## 2020-01-01 RX ORDER — QUETIAPINE FUMARATE 25 MG/1
25 TABLET, FILM COATED ORAL 2 TIMES DAILY
Status: DISCONTINUED | OUTPATIENT
Start: 2020-01-01 | End: 2020-01-01

## 2020-01-01 RX ORDER — QUETIAPINE FUMARATE 25 MG/1
50 TABLET, FILM COATED ORAL 2 TIMES DAILY
Status: DISCONTINUED | OUTPATIENT
Start: 2020-01-01 | End: 2020-01-01

## 2020-01-01 RX ORDER — PANTOPRAZOLE SODIUM 40 MG/10ML
40 INJECTION, POWDER, LYOPHILIZED, FOR SOLUTION INTRAVENOUS DAILY
Status: DISCONTINUED | OUTPATIENT
Start: 2020-01-01 | End: 2020-01-01

## 2020-01-01 RX ORDER — GLYCOPYRROLATE 0.2 MG/ML
0.2 INJECTION INTRAMUSCULAR; INTRAVENOUS
Status: CANCELLED | OUTPATIENT
Start: 2020-01-01

## 2020-01-01 RX ORDER — SODIUM CHLORIDE 9 MG/ML
INJECTION, SOLUTION INTRAVENOUS CONTINUOUS
Status: DISCONTINUED | OUTPATIENT
Start: 2020-01-01 | End: 2020-01-01 | Stop reason: HOSPADM

## 2020-01-01 RX ORDER — GLYCOPYRROLATE 0.2 MG/ML
0.2 INJECTION INTRAMUSCULAR; INTRAVENOUS
Status: DISCONTINUED | OUTPATIENT
Start: 2020-01-01 | End: 2020-01-01 | Stop reason: HOSPADM

## 2020-01-01 RX ORDER — NICOTINE 21 MG/24HR
1 PATCH, TRANSDERMAL 24 HOURS TRANSDERMAL DAILY
Status: DISCONTINUED | OUTPATIENT
Start: 2020-01-01 | End: 2020-01-01 | Stop reason: HOSPADM

## 2020-01-01 RX ORDER — LORAZEPAM 2 MG/ML
1 INJECTION INTRAMUSCULAR
Status: COMPLETED | OUTPATIENT
Start: 2020-01-01 | End: 2020-01-01

## 2020-01-01 RX ORDER — QUETIAPINE FUMARATE 50 MG/1
50 TABLET, FILM COATED ORAL 2 TIMES DAILY
Qty: 60 TABLET | Refills: 3 | Status: SHIPPED | OUTPATIENT
Start: 2020-01-01

## 2020-01-01 RX ORDER — ETOMIDATE 2 MG/ML
20 INJECTION INTRAVENOUS ONCE
Status: COMPLETED | OUTPATIENT
Start: 2020-01-01 | End: 2020-01-01

## 2020-01-01 RX ADMIN — PROPOFOL 25 MCG/KG/MIN: 10 INJECTION, EMULSION INTRAVENOUS at 07:50

## 2020-01-01 RX ADMIN — THERA TABS 1 TABLET: TAB at 08:04

## 2020-01-01 RX ADMIN — MORPHINE SULFATE 2 MG: 2 INJECTION, SOLUTION INTRAMUSCULAR; INTRAVENOUS at 04:38

## 2020-01-01 RX ADMIN — LISINOPRIL 5 MG: 5 TABLET ORAL at 16:48

## 2020-01-01 RX ADMIN — MORPHINE SULFATE 2 MG: 2 INJECTION, SOLUTION INTRAMUSCULAR; INTRAVENOUS at 01:13

## 2020-01-01 RX ADMIN — DEXAMETHASONE SODIUM PHOSPHATE 4 MG: 4 INJECTION, SOLUTION INTRA-ARTICULAR; INTRALESIONAL; INTRAMUSCULAR; INTRAVENOUS; SOFT TISSUE at 01:06

## 2020-01-01 RX ADMIN — Medication 10 ML: at 21:19

## 2020-01-01 RX ADMIN — INSULIN LISPRO 1 UNITS: 100 INJECTION, SOLUTION INTRAVENOUS; SUBCUTANEOUS at 05:24

## 2020-01-01 RX ADMIN — PANTOPRAZOLE SODIUM 40 MG: 40 TABLET, DELAYED RELEASE ORAL at 06:24

## 2020-01-01 RX ADMIN — DEXAMETHASONE SODIUM PHOSPHATE 10 MG: 4 INJECTION, SOLUTION INTRAMUSCULAR; INTRAVENOUS at 19:24

## 2020-01-01 RX ADMIN — MORPHINE SULFATE 2 MG: 2 INJECTION, SOLUTION INTRAMUSCULAR; INTRAVENOUS at 02:26

## 2020-01-01 RX ADMIN — DEXAMETHASONE 4 MG: 4 TABLET ORAL at 06:16

## 2020-01-01 RX ADMIN — MORPHINE SULFATE 2 MG: 2 INJECTION, SOLUTION INTRAMUSCULAR; INTRAVENOUS at 07:40

## 2020-01-01 RX ADMIN — DEXAMETHASONE SODIUM PHOSPHATE 4 MG: 4 INJECTION, SOLUTION INTRA-ARTICULAR; INTRALESIONAL; INTRAMUSCULAR; INTRAVENOUS; SOFT TISSUE at 03:06

## 2020-01-01 RX ADMIN — LORAZEPAM 2 MG: 2 INJECTION INTRAMUSCULAR; INTRAVENOUS at 00:33

## 2020-01-01 RX ADMIN — FOLIC ACID 1 MG: 1 TABLET ORAL at 10:06

## 2020-01-01 RX ADMIN — LEVETIRACETAM 1000 MG: 500 TABLET, FILM COATED ORAL at 21:21

## 2020-01-01 RX ADMIN — PROPOFOL 25 MCG/KG/MIN: 10 INJECTION, EMULSION INTRAVENOUS at 11:59

## 2020-01-01 RX ADMIN — DEXAMETHASONE SODIUM PHOSPHATE 4 MG: 4 INJECTION, SOLUTION INTRA-ARTICULAR; INTRALESIONAL; INTRAMUSCULAR; INTRAVENOUS; SOFT TISSUE at 20:43

## 2020-01-01 RX ADMIN — Medication 100 MG: at 08:04

## 2020-01-01 RX ADMIN — VALPROATE SODIUM 750 MG: 100 INJECTION, SOLUTION INTRAVENOUS at 00:52

## 2020-01-01 RX ADMIN — PIPERACILLIN AND TAZOBACTAM 3.38 G: 3; .375 INJECTION, POWDER, FOR SOLUTION INTRAVENOUS at 10:37

## 2020-01-01 RX ADMIN — DEXAMETHASONE SODIUM PHOSPHATE 4 MG: 4 INJECTION, SOLUTION INTRAMUSCULAR; INTRAVENOUS at 20:47

## 2020-01-01 RX ADMIN — LEVETIRACETAM 1500 MG: 100 INJECTION, SOLUTION INTRAVENOUS at 16:31

## 2020-01-01 RX ADMIN — FOLIC ACID 1 MG: 1 TABLET ORAL at 08:04

## 2020-01-01 RX ADMIN — PANTOPRAZOLE SODIUM 40 MG: 40 INJECTION, POWDER, FOR SOLUTION INTRAVENOUS at 20:48

## 2020-01-01 RX ADMIN — VALPROATE SODIUM 750 MG: 100 INJECTION, SOLUTION INTRAVENOUS at 17:37

## 2020-01-01 RX ADMIN — DEXTROSE MONOHYDRATE 50 MG: 50 INJECTION, SOLUTION INTRAVENOUS at 08:37

## 2020-01-01 RX ADMIN — PIPERACILLIN AND TAZOBACTAM 3.38 G: 3; .375 INJECTION, POWDER, FOR SOLUTION INTRAVENOUS at 08:37

## 2020-01-01 RX ADMIN — LEVETIRACETAM 1000 MG: 500 TABLET, FILM COATED ORAL at 19:34

## 2020-01-01 RX ADMIN — PROPOFOL INJECTABLE EMULSION 40 MCG/KG/MIN: 10 INJECTION, EMULSION INTRAVENOUS at 15:41

## 2020-01-01 RX ADMIN — DEXAMETHASONE SODIUM PHOSPHATE 4 MG: 4 INJECTION, SOLUTION INTRA-ARTICULAR; INTRALESIONAL; INTRAMUSCULAR; INTRAVENOUS; SOFT TISSUE at 00:03

## 2020-01-01 RX ADMIN — MORPHINE SULFATE 2 MG: 2 INJECTION, SOLUTION INTRAMUSCULAR; INTRAVENOUS at 04:01

## 2020-01-01 RX ADMIN — PROPOFOL INJECTABLE EMULSION 25 MCG/KG/MIN: 10 INJECTION, EMULSION INTRAVENOUS at 20:56

## 2020-01-01 RX ADMIN — DEXTROSE MONOHYDRATE 100 MG: 50 INJECTION, SOLUTION INTRAVENOUS at 11:22

## 2020-01-01 RX ADMIN — DEXAMETHASONE SODIUM PHOSPHATE 4 MG: 4 INJECTION, SOLUTION INTRA-ARTICULAR; INTRALESIONAL; INTRAMUSCULAR; INTRAVENOUS; SOFT TISSUE at 16:16

## 2020-01-01 RX ADMIN — PIPERACILLIN AND TAZOBACTAM 3.38 G: 3; .375 INJECTION, POWDER, FOR SOLUTION INTRAVENOUS at 01:20

## 2020-01-01 RX ADMIN — PIPERACILLIN AND TAZOBACTAM 3.38 G: 3; .375 INJECTION, POWDER, FOR SOLUTION INTRAVENOUS at 01:15

## 2020-01-01 RX ADMIN — GLYCOPYRROLATE AND FORMOTEROL FUMARATE 2 PUFF: 9; 4.8 AEROSOL, METERED RESPIRATORY (INHALATION) at 22:30

## 2020-01-01 RX ADMIN — DEXAMETHASONE SODIUM PHOSPHATE 4 MG: 4 INJECTION, SOLUTION INTRA-ARTICULAR; INTRALESIONAL; INTRAMUSCULAR; INTRAVENOUS; SOFT TISSUE at 21:01

## 2020-01-01 RX ADMIN — PROPOFOL 20 MCG/KG/MIN: 10 INJECTION, EMULSION INTRAVENOUS at 15:49

## 2020-01-01 RX ADMIN — Medication 100 MG: at 10:06

## 2020-01-01 RX ADMIN — LORAZEPAM 2 MG: 2 INJECTION INTRAMUSCULAR; INTRAVENOUS at 07:09

## 2020-01-01 RX ADMIN — VALPROATE SODIUM 750 MG: 100 INJECTION, SOLUTION INTRAVENOUS at 08:49

## 2020-01-01 RX ADMIN — DEXAMETHASONE SODIUM PHOSPHATE 4 MG: 4 INJECTION, SOLUTION INTRA-ARTICULAR; INTRALESIONAL; INTRAMUSCULAR; INTRAVENOUS; SOFT TISSUE at 16:17

## 2020-01-01 RX ADMIN — LORAZEPAM 2 MG: 2 INJECTION INTRAMUSCULAR; INTRAVENOUS at 08:39

## 2020-01-01 RX ADMIN — GLYCOPYRROLATE 0.2 MG: 0.2 INJECTION, SOLUTION INTRAMUSCULAR; INTRAVENOUS at 15:15

## 2020-01-01 RX ADMIN — DEXAMETHASONE 4 MG: 4 TABLET ORAL at 21:10

## 2020-01-01 RX ADMIN — LORAZEPAM 1 MG: 2 INJECTION INTRAMUSCULAR; INTRAVENOUS at 14:50

## 2020-01-01 RX ADMIN — DEXTROSE MONOHYDRATE 100 MG: 50 INJECTION, SOLUTION INTRAVENOUS at 10:35

## 2020-01-01 RX ADMIN — DEXAMETHASONE SODIUM PHOSPHATE 4 MG: 4 INJECTION, SOLUTION INTRA-ARTICULAR; INTRALESIONAL; INTRAMUSCULAR; INTRAVENOUS; SOFT TISSUE at 22:01

## 2020-01-01 RX ADMIN — LORAZEPAM 2 MG: 2 INJECTION, SOLUTION INTRAMUSCULAR; INTRAVENOUS at 07:19

## 2020-01-01 RX ADMIN — Medication 15 ML: at 16:15

## 2020-01-01 RX ADMIN — PIPERACILLIN AND TAZOBACTAM 3.38 G: 3; .375 INJECTION, POWDER, FOR SOLUTION INTRAVENOUS at 16:42

## 2020-01-01 RX ADMIN — THERA TABS 1 TABLET: TAB at 09:08

## 2020-01-01 RX ADMIN — FOLIC ACID: 5 INJECTION, SOLUTION INTRAMUSCULAR; INTRAVENOUS; SUBCUTANEOUS at 14:05

## 2020-01-01 RX ADMIN — Medication 15 ML: at 20:37

## 2020-01-01 RX ADMIN — DEXMEDETOMIDINE 0.4 MCG/KG/HR: 100 INJECTION, SOLUTION, CONCENTRATE INTRAVENOUS at 09:28

## 2020-01-01 RX ADMIN — DEXTROSE MONOHYDRATE 100 MG: 50 INJECTION, SOLUTION INTRAVENOUS at 11:40

## 2020-01-01 RX ADMIN — DEXAMETHASONE SODIUM PHOSPHATE 4 MG: 4 INJECTION, SOLUTION INTRA-ARTICULAR; INTRALESIONAL; INTRAMUSCULAR; INTRAVENOUS; SOFT TISSUE at 09:08

## 2020-01-01 RX ADMIN — VALPROATE SODIUM 750 MG: 100 INJECTION, SOLUTION INTRAVENOUS at 10:31

## 2020-01-01 RX ADMIN — PANTOPRAZOLE SODIUM 40 MG: 40 TABLET, DELAYED RELEASE ORAL at 20:32

## 2020-01-01 RX ADMIN — Medication 15 ML: at 08:37

## 2020-01-01 RX ADMIN — GLYCOPYRROLATE AND FORMOTEROL FUMARATE 2 PUFF: 9; 4.8 AEROSOL, METERED RESPIRATORY (INHALATION) at 07:38

## 2020-01-01 RX ADMIN — LORAZEPAM 2 MG: 2 INJECTION INTRAMUSCULAR; INTRAVENOUS at 15:52

## 2020-01-01 RX ADMIN — VECURONIUM BROMIDE 10 MG: 10 INJECTION, POWDER, LYOPHILIZED, FOR SOLUTION INTRAVENOUS at 17:25

## 2020-01-01 RX ADMIN — PROPOFOL INJECTABLE EMULSION 20 MCG/KG/MIN: 10 INJECTION, EMULSION INTRAVENOUS at 15:49

## 2020-01-01 RX ADMIN — PROPOFOL 15 MCG/KG/MIN: 10 INJECTION, EMULSION INTRAVENOUS at 06:16

## 2020-01-01 RX ADMIN — MORPHINE SULFATE 2 MG: 2 INJECTION, SOLUTION INTRAMUSCULAR; INTRAVENOUS at 15:52

## 2020-01-01 RX ADMIN — PROPOFOL 10 MCG/KG/MIN: 10 INJECTION, EMULSION INTRAVENOUS at 16:21

## 2020-01-01 RX ADMIN — PIPERACILLIN AND TAZOBACTAM 3.38 G: 3; .375 INJECTION, POWDER, FOR SOLUTION INTRAVENOUS at 17:23

## 2020-01-01 RX ADMIN — SODIUM CHLORIDE 2000 ML: 9 INJECTION, SOLUTION INTRAVENOUS at 14:04

## 2020-01-01 RX ADMIN — LEVETIRACETAM 1000 MG: 500 TABLET, FILM COATED ORAL at 09:08

## 2020-01-01 RX ADMIN — METOCLOPRAMIDE 10 MG: 5 INJECTION, SOLUTION INTRAMUSCULAR; INTRAVENOUS at 16:30

## 2020-01-01 RX ADMIN — LISINOPRIL 10 MG: 10 TABLET ORAL at 08:04

## 2020-01-01 RX ADMIN — PROPOFOL INJECTABLE EMULSION 25 MCG/KG/MIN: 10 INJECTION, EMULSION INTRAVENOUS at 17:31

## 2020-01-01 RX ADMIN — PROPOFOL INJECTABLE EMULSION 45 MCG/KG/MIN: 10 INJECTION, EMULSION INTRAVENOUS at 11:09

## 2020-01-01 RX ADMIN — DEXTROSE MONOHYDRATE 50 MG: 50 INJECTION, SOLUTION INTRAVENOUS at 20:37

## 2020-01-01 RX ADMIN — SODIUM CHLORIDE: 9 INJECTION, SOLUTION INTRAVENOUS at 11:16

## 2020-01-01 RX ADMIN — IOPAMIDOL 80 ML: 755 INJECTION, SOLUTION INTRAVENOUS at 12:39

## 2020-01-01 RX ADMIN — Medication 100 MG: at 15:45

## 2020-01-01 RX ADMIN — LORAZEPAM 2 MG: 2 INJECTION INTRAMUSCULAR; INTRAVENOUS at 20:45

## 2020-01-01 RX ADMIN — DEXAMETHASONE 4 MG: 4 TABLET ORAL at 00:08

## 2020-01-01 RX ADMIN — GADOTERIDOL 15 ML: 279.3 INJECTION, SOLUTION INTRAVENOUS at 13:48

## 2020-01-01 RX ADMIN — DEXAMETHASONE SODIUM PHOSPHATE 4 MG: 4 INJECTION, SOLUTION INTRAMUSCULAR; INTRAVENOUS at 02:20

## 2020-01-01 RX ADMIN — GLYCOPYRROLATE 0.2 MG: 0.2 INJECTION, SOLUTION INTRAMUSCULAR; INTRAVENOUS at 20:30

## 2020-01-01 RX ADMIN — LISINOPRIL 10 MG: 10 TABLET ORAL at 09:05

## 2020-01-01 RX ADMIN — LORAZEPAM 2 MG: 2 INJECTION INTRAMUSCULAR; INTRAVENOUS at 22:34

## 2020-01-01 RX ADMIN — DEXTROSE MONOHYDRATE 100 MG: 50 INJECTION, SOLUTION INTRAVENOUS at 23:32

## 2020-01-01 RX ADMIN — DEXMEDETOMIDINE 0.6 MCG/KG/HR: 100 INJECTION, SOLUTION, CONCENTRATE INTRAVENOUS at 05:13

## 2020-01-01 RX ADMIN — PROPOFOL INJECTABLE EMULSION 45 MCG/KG/MIN: 10 INJECTION, EMULSION INTRAVENOUS at 22:23

## 2020-01-01 RX ADMIN — VALPROATE SODIUM 750 MG: 100 INJECTION, SOLUTION INTRAVENOUS at 17:24

## 2020-01-01 RX ADMIN — SODIUM CHLORIDE 1000 ML: 9 INJECTION, SOLUTION INTRAVENOUS at 13:45

## 2020-01-01 RX ADMIN — DEXAMETHASONE SODIUM PHOSPHATE 10 MG: 4 INJECTION, SOLUTION INTRA-ARTICULAR; INTRALESIONAL; INTRAMUSCULAR; INTRAVENOUS; SOFT TISSUE at 16:30

## 2020-01-01 RX ADMIN — Medication 100 MG: at 10:11

## 2020-01-01 RX ADMIN — DEXTROSE MONOHYDRATE 50 MG: 50 INJECTION, SOLUTION INTRAVENOUS at 11:52

## 2020-01-01 RX ADMIN — LEVETIRACETAM 1000 MG: 500 TABLET, FILM COATED ORAL at 10:06

## 2020-01-01 RX ADMIN — FOLIC ACID 1 MG: 1 TABLET ORAL at 09:05

## 2020-01-01 RX ADMIN — THERA TABS 1 TABLET: TAB at 09:05

## 2020-01-01 RX ADMIN — DEXAMETHASONE SODIUM PHOSPHATE 4 MG: 4 INJECTION, SOLUTION INTRA-ARTICULAR; INTRALESIONAL; INTRAMUSCULAR; INTRAVENOUS; SOFT TISSUE at 17:19

## 2020-01-01 RX ADMIN — LORAZEPAM 1 MG: 2 INJECTION INTRAMUSCULAR; INTRAVENOUS at 13:31

## 2020-01-01 RX ADMIN — PROPOFOL INJECTABLE EMULSION 45 MCG/KG/MIN: 10 INJECTION, EMULSION INTRAVENOUS at 05:13

## 2020-01-01 RX ADMIN — VALPROATE SODIUM 750 MG: 100 INJECTION, SOLUTION INTRAVENOUS at 02:29

## 2020-01-01 RX ADMIN — Medication 10 ML: at 10:06

## 2020-01-01 RX ADMIN — PANTOPRAZOLE SODIUM 40 MG: 40 TABLET, DELAYED RELEASE ORAL at 06:16

## 2020-01-01 RX ADMIN — LORAZEPAM 2 MG: 2 INJECTION INTRAMUSCULAR; INTRAVENOUS at 03:50

## 2020-01-01 RX ADMIN — PROPOFOL INJECTABLE EMULSION 45 MCG/KG/MIN: 10 INJECTION, EMULSION INTRAVENOUS at 07:58

## 2020-01-01 RX ADMIN — DEXTROSE MONOHYDRATE 100 MG: 50 INJECTION, SOLUTION INTRAVENOUS at 20:44

## 2020-01-01 RX ADMIN — FENTANYL CITRATE 25 MCG: 50 INJECTION, SOLUTION INTRAMUSCULAR; INTRAVENOUS at 08:08

## 2020-01-01 RX ADMIN — DEXTROSE MONOHYDRATE 50 MG: 50 INJECTION, SOLUTION INTRAVENOUS at 22:35

## 2020-01-01 RX ADMIN — INSULIN LISPRO 1 UNITS: 100 INJECTION, SOLUTION INTRAVENOUS; SUBCUTANEOUS at 12:49

## 2020-01-01 RX ADMIN — LORAZEPAM 0.5 MG: 0.5 TABLET ORAL at 17:54

## 2020-01-01 RX ADMIN — LEVETIRACETAM 1000 MG: 500 TABLET, FILM COATED ORAL at 21:19

## 2020-01-01 RX ADMIN — MORPHINE SULFATE 2 MG: 2 INJECTION, SOLUTION INTRAMUSCULAR; INTRAVENOUS at 01:48

## 2020-01-01 RX ADMIN — MORPHINE SULFATE 2 MG: 2 INJECTION, SOLUTION INTRAMUSCULAR; INTRAVENOUS at 22:06

## 2020-01-01 RX ADMIN — DEXAMETHASONE SODIUM PHOSPHATE 4 MG: 4 INJECTION, SOLUTION INTRA-ARTICULAR; INTRALESIONAL; INTRAMUSCULAR; INTRAVENOUS; SOFT TISSUE at 04:27

## 2020-01-01 RX ADMIN — LORAZEPAM 2 MG: 2 INJECTION INTRAMUSCULAR; INTRAVENOUS at 13:59

## 2020-01-01 RX ADMIN — VALPROATE SODIUM 750 MG: 100 INJECTION, SOLUTION INTRAVENOUS at 11:07

## 2020-01-01 RX ADMIN — LORAZEPAM 2 MG: 2 INJECTION INTRAMUSCULAR; INTRAVENOUS at 08:43

## 2020-01-01 RX ADMIN — VALPROATE SODIUM 750 MG: 100 INJECTION, SOLUTION INTRAVENOUS at 18:43

## 2020-01-01 RX ADMIN — DIPHENHYDRAMINE HYDROCHLORIDE 50 MG: 50 INJECTION, SOLUTION INTRAMUSCULAR; INTRAVENOUS at 16:30

## 2020-01-01 RX ADMIN — SODIUM CHLORIDE 15 ML/HR: 3 INJECTION, SOLUTION INTRAVENOUS at 10:59

## 2020-01-01 RX ADMIN — DEXAMETHASONE SODIUM PHOSPHATE 4 MG: 4 INJECTION, SOLUTION INTRA-ARTICULAR; INTRALESIONAL; INTRAMUSCULAR; INTRAVENOUS; SOFT TISSUE at 10:51

## 2020-01-01 RX ADMIN — DEXAMETHASONE 4 MG: 4 TABLET ORAL at 17:24

## 2020-01-01 RX ADMIN — Medication 100 MG: at 09:05

## 2020-01-01 RX ADMIN — DEXAMETHASONE SODIUM PHOSPHATE 4 MG: 4 INJECTION, SOLUTION INTRA-ARTICULAR; INTRALESIONAL; INTRAMUSCULAR; INTRAVENOUS; SOFT TISSUE at 12:56

## 2020-01-01 RX ADMIN — PIPERACILLIN AND TAZOBACTAM 3.38 G: 3; .375 INJECTION, POWDER, FOR SOLUTION INTRAVENOUS at 16:45

## 2020-01-01 RX ADMIN — PROPOFOL INJECTABLE EMULSION 45 MCG/KG/MIN: 10 INJECTION, EMULSION INTRAVENOUS at 20:45

## 2020-01-01 RX ADMIN — GLYCOPYRROLATE 0.2 MG: 0.2 INJECTION, SOLUTION INTRAMUSCULAR; INTRAVENOUS at 07:17

## 2020-01-01 RX ADMIN — PANTOPRAZOLE SODIUM 40 MG: 40 TABLET, DELAYED RELEASE ORAL at 16:38

## 2020-01-01 RX ADMIN — DEXAMETHASONE SODIUM PHOSPHATE 4 MG: 4 INJECTION, SOLUTION INTRAMUSCULAR; INTRAVENOUS at 20:33

## 2020-01-01 RX ADMIN — MORPHINE SULFATE 2 MG: 2 INJECTION, SOLUTION INTRAMUSCULAR; INTRAVENOUS at 05:44

## 2020-01-01 RX ADMIN — DEXAMETHASONE SODIUM PHOSPHATE 4 MG: 4 INJECTION, SOLUTION INTRA-ARTICULAR; INTRALESIONAL; INTRAMUSCULAR; INTRAVENOUS; SOFT TISSUE at 03:55

## 2020-01-01 RX ADMIN — MORPHINE SULFATE 2 MG: 2 INJECTION, SOLUTION INTRAMUSCULAR; INTRAVENOUS at 14:24

## 2020-01-01 RX ADMIN — LEVETIRACETAM 1000 MG: 100 INJECTION, SOLUTION INTRAVENOUS at 10:15

## 2020-01-01 RX ADMIN — DEXAMETHASONE 4 MG: 4 TABLET ORAL at 06:31

## 2020-01-01 RX ADMIN — MORPHINE SULFATE 2 MG: 2 INJECTION, SOLUTION INTRAMUSCULAR; INTRAVENOUS at 13:59

## 2020-01-01 RX ADMIN — DEXAMETHASONE SODIUM PHOSPHATE 4 MG: 4 INJECTION, SOLUTION INTRA-ARTICULAR; INTRALESIONAL; INTRAMUSCULAR; INTRAVENOUS; SOFT TISSUE at 06:05

## 2020-01-01 RX ADMIN — PANTOPRAZOLE SODIUM 40 MG: 40 INJECTION, POWDER, FOR SOLUTION INTRAVENOUS at 10:51

## 2020-01-01 RX ADMIN — LORAZEPAM 1 MG: 2 INJECTION INTRAMUSCULAR at 13:24

## 2020-01-01 RX ADMIN — DEXAMETHASONE SODIUM PHOSPHATE 4 MG: 4 INJECTION, SOLUTION INTRA-ARTICULAR; INTRALESIONAL; INTRAMUSCULAR; INTRAVENOUS; SOFT TISSUE at 20:48

## 2020-01-01 RX ADMIN — DEXAMETHASONE SODIUM PHOSPHATE 4 MG: 4 INJECTION, SOLUTION INTRAMUSCULAR; INTRAVENOUS at 11:12

## 2020-01-01 RX ADMIN — Medication 10 ML: at 08:04

## 2020-01-01 RX ADMIN — LEVETIRACETAM 1000 MG: 500 TABLET, FILM COATED ORAL at 21:10

## 2020-01-01 RX ADMIN — DEXMEDETOMIDINE 0.3 MCG/KG/HR: 100 INJECTION, SOLUTION, CONCENTRATE INTRAVENOUS at 15:17

## 2020-01-01 RX ADMIN — DEXAMETHASONE SODIUM PHOSPHATE 4 MG: 4 INJECTION, SOLUTION INTRA-ARTICULAR; INTRALESIONAL; INTRAMUSCULAR; INTRAVENOUS; SOFT TISSUE at 06:30

## 2020-01-01 RX ADMIN — VALPROATE SODIUM 750 MG: 100 INJECTION, SOLUTION INTRAVENOUS at 06:07

## 2020-01-01 RX ADMIN — VALPROATE SODIUM 750 MG: 100 INJECTION, SOLUTION INTRAVENOUS at 23:18

## 2020-01-01 RX ADMIN — PIPERACILLIN AND TAZOBACTAM 3.38 G: 3; .375 INJECTION, POWDER, FOR SOLUTION INTRAVENOUS at 18:42

## 2020-01-01 RX ADMIN — GADOTERIDOL 20 ML: 279.3 INJECTION, SOLUTION INTRAVENOUS at 18:26

## 2020-01-01 RX ADMIN — DEXMEDETOMIDINE 0.4 MCG/KG/HR: 100 INJECTION, SOLUTION, CONCENTRATE INTRAVENOUS at 20:33

## 2020-01-01 RX ADMIN — LORAZEPAM 1 MG: 2 INJECTION INTRAMUSCULAR at 14:50

## 2020-01-01 RX ADMIN — DEXAMETHASONE SODIUM PHOSPHATE 4 MG: 4 INJECTION, SOLUTION INTRA-ARTICULAR; INTRALESIONAL; INTRAMUSCULAR; INTRAVENOUS; SOFT TISSUE at 10:29

## 2020-01-01 RX ADMIN — PANTOPRAZOLE SODIUM 40 MG: 40 INJECTION, POWDER, FOR SOLUTION INTRAVENOUS at 11:35

## 2020-01-01 RX ADMIN — LEVETIRACETAM 1000 MG: 100 INJECTION, SOLUTION INTRAVENOUS at 21:26

## 2020-01-01 RX ADMIN — DEXAMETHASONE SODIUM PHOSPHATE 4 MG: 4 INJECTION, SOLUTION INTRA-ARTICULAR; INTRALESIONAL; INTRAMUSCULAR; INTRAVENOUS; SOFT TISSUE at 05:50

## 2020-01-01 RX ADMIN — FOLIC ACID 1 MG: 1 TABLET ORAL at 10:11

## 2020-01-01 RX ADMIN — MORPHINE SULFATE 2 MG: 2 INJECTION, SOLUTION INTRAMUSCULAR; INTRAVENOUS at 22:32

## 2020-01-01 RX ADMIN — Medication 1 PUFF: at 11:15

## 2020-01-01 RX ADMIN — Medication 10 ML: at 19:34

## 2020-01-01 RX ADMIN — ZIPRASIDONE MESYLATE 15 MG: 20 INJECTION, POWDER, LYOPHILIZED, FOR SOLUTION INTRAMUSCULAR at 13:47

## 2020-01-01 RX ADMIN — VALPROATE SODIUM 750 MG: 100 INJECTION, SOLUTION INTRAVENOUS at 11:58

## 2020-01-01 RX ADMIN — VALPROATE SODIUM 750 MG: 100 INJECTION, SOLUTION INTRAVENOUS at 09:44

## 2020-01-01 RX ADMIN — PIPERACILLIN AND TAZOBACTAM 3.38 G: 3; .375 INJECTION, POWDER, FOR SOLUTION INTRAVENOUS at 11:22

## 2020-01-01 RX ADMIN — GLYCOPYRROLATE AND FORMOTEROL FUMARATE 2 PUFF: 9; 4.8 AEROSOL, METERED RESPIRATORY (INHALATION) at 22:31

## 2020-01-01 RX ADMIN — GLYCOPYRROLATE 0.2 MG: 0.2 INJECTION, SOLUTION INTRAMUSCULAR; INTRAVENOUS at 09:44

## 2020-01-01 RX ADMIN — MORPHINE SULFATE 2 MG: 2 INJECTION, SOLUTION INTRAMUSCULAR; INTRAVENOUS at 23:41

## 2020-01-01 RX ADMIN — PIPERACILLIN AND TAZOBACTAM 3.38 G: 3; .375 INJECTION, POWDER, FOR SOLUTION INTRAVENOUS at 16:17

## 2020-01-01 RX ADMIN — PANTOPRAZOLE SODIUM 40 MG: 40 TABLET, DELAYED RELEASE ORAL at 16:37

## 2020-01-01 RX ADMIN — LISINOPRIL 10 MG: 10 TABLET ORAL at 10:06

## 2020-01-01 RX ADMIN — DEXTROSE MONOHYDRATE 100 MG: 50 INJECTION, SOLUTION INTRAVENOUS at 20:32

## 2020-01-01 RX ADMIN — VALPROATE SODIUM 750 MG: 100 INJECTION, SOLUTION INTRAVENOUS at 08:39

## 2020-01-01 RX ADMIN — PROPOFOL INJECTABLE EMULSION 40 MCG/KG/MIN: 10 INJECTION, EMULSION INTRAVENOUS at 13:15

## 2020-01-01 RX ADMIN — PANTOPRAZOLE SODIUM 40 MG: 40 TABLET, DELAYED RELEASE ORAL at 06:31

## 2020-01-01 RX ADMIN — PROPOFOL INJECTABLE EMULSION 45 MCG/KG/MIN: 10 INJECTION, EMULSION INTRAVENOUS at 00:32

## 2020-01-01 RX ADMIN — Medication 15 ML: at 20:39

## 2020-01-01 RX ADMIN — VALPROATE SODIUM 750 MG: 100 INJECTION, SOLUTION INTRAVENOUS at 15:24

## 2020-01-01 RX ADMIN — PANTOPRAZOLE SODIUM 40 MG: 40 TABLET, DELAYED RELEASE ORAL at 15:56

## 2020-01-01 RX ADMIN — DEXTROSE MONOHYDRATE 100 MG: 50 INJECTION, SOLUTION INTRAVENOUS at 20:15

## 2020-01-01 RX ADMIN — MORPHINE SULFATE 2 MG: 2 INJECTION, SOLUTION INTRAMUSCULAR; INTRAVENOUS at 20:31

## 2020-01-01 RX ADMIN — LORAZEPAM 2 MG: 2 INJECTION INTRAMUSCULAR; INTRAVENOUS at 06:47

## 2020-01-01 RX ADMIN — PIPERACILLIN AND TAZOBACTAM 3.38 G: 3; .375 INJECTION, POWDER, FOR SOLUTION INTRAVENOUS at 09:13

## 2020-01-01 RX ADMIN — MORPHINE SULFATE 2 MG: 2 INJECTION, SOLUTION INTRAMUSCULAR; INTRAVENOUS at 13:00

## 2020-01-01 RX ADMIN — LEVETIRACETAM 1000 MG: 100 INJECTION, SOLUTION INTRAVENOUS at 15:46

## 2020-01-01 RX ADMIN — DEXAMETHASONE SODIUM PHOSPHATE 4 MG: 4 INJECTION, SOLUTION INTRA-ARTICULAR; INTRALESIONAL; INTRAMUSCULAR; INTRAVENOUS; SOFT TISSUE at 17:56

## 2020-01-01 RX ADMIN — PIPERACILLIN AND TAZOBACTAM 3.38 G: 3; .375 INJECTION, POWDER, FOR SOLUTION INTRAVENOUS at 01:28

## 2020-01-01 RX ADMIN — DEXAMETHASONE SODIUM PHOSPHATE 4 MG: 4 INJECTION, SOLUTION INTRA-ARTICULAR; INTRALESIONAL; INTRAMUSCULAR; INTRAVENOUS; SOFT TISSUE at 05:56

## 2020-01-01 RX ADMIN — FENTANYL CITRATE 25 MCG: 50 INJECTION, SOLUTION INTRAMUSCULAR; INTRAVENOUS at 09:19

## 2020-01-01 RX ADMIN — Medication 10 ML: at 09:09

## 2020-01-01 RX ADMIN — VALPROATE SODIUM 750 MG: 100 INJECTION, SOLUTION INTRAVENOUS at 09:13

## 2020-01-01 RX ADMIN — GLYCOPYRROLATE 0.2 MG: 0.2 INJECTION, SOLUTION INTRAMUSCULAR; INTRAVENOUS at 01:36

## 2020-01-01 RX ADMIN — DEXAMETHASONE SODIUM PHOSPHATE 4 MG: 4 INJECTION, SOLUTION INTRA-ARTICULAR; INTRALESIONAL; INTRAMUSCULAR; INTRAVENOUS; SOFT TISSUE at 14:37

## 2020-01-01 RX ADMIN — MORPHINE SULFATE 2 MG: 2 INJECTION, SOLUTION INTRAMUSCULAR; INTRAVENOUS at 05:06

## 2020-01-01 RX ADMIN — DEXAMETHASONE SODIUM PHOSPHATE 4 MG: 4 INJECTION, SOLUTION INTRAMUSCULAR; INTRAVENOUS at 15:18

## 2020-01-01 RX ADMIN — GLYCOPYRROLATE 0.2 MG: 0.2 INJECTION, SOLUTION INTRAMUSCULAR; INTRAVENOUS at 23:07

## 2020-01-01 RX ADMIN — FOLIC ACID 1 MG: 1 TABLET ORAL at 15:45

## 2020-01-01 RX ADMIN — PROPOFOL INJECTABLE EMULSION 51.45 MCG/KG/MIN: 10 INJECTION, EMULSION INTRAVENOUS at 18:44

## 2020-01-01 RX ADMIN — Medication 100 MG: at 13:09

## 2020-01-01 RX ADMIN — MORPHINE SULFATE 2 MG: 2 INJECTION, SOLUTION INTRAMUSCULAR; INTRAVENOUS at 08:43

## 2020-01-01 RX ADMIN — DEXTROSE MONOHYDRATE 100 MG: 50 INJECTION, SOLUTION INTRAVENOUS at 09:49

## 2020-01-01 RX ADMIN — DEXAMETHASONE SODIUM PHOSPHATE 4 MG: 4 INJECTION, SOLUTION INTRA-ARTICULAR; INTRALESIONAL; INTRAMUSCULAR; INTRAVENOUS; SOFT TISSUE at 20:45

## 2020-01-01 RX ADMIN — DEXAMETHASONE SODIUM PHOSPHATE 4 MG: 4 INJECTION, SOLUTION INTRA-ARTICULAR; INTRALESIONAL; INTRAMUSCULAR; INTRAVENOUS; SOFT TISSUE at 12:35

## 2020-01-01 RX ADMIN — MORPHINE SULFATE 2 MG: 2 INJECTION, SOLUTION INTRAMUSCULAR; INTRAVENOUS at 00:02

## 2020-01-01 RX ADMIN — DEXTROSE MONOHYDRATE 100 MG: 50 INJECTION, SOLUTION INTRAVENOUS at 22:01

## 2020-01-01 RX ADMIN — THERA TABS 1 TABLET: TAB at 10:06

## 2020-01-01 RX ADMIN — PANTOPRAZOLE SODIUM 40 MG: 40 INJECTION, POWDER, FOR SOLUTION INTRAVENOUS at 09:08

## 2020-01-01 RX ADMIN — INSULIN LISPRO 2 UNITS: 100 INJECTION, SOLUTION INTRAVENOUS; SUBCUTANEOUS at 20:45

## 2020-01-01 RX ADMIN — DEXAMETHASONE SODIUM PHOSPHATE 4 MG: 4 INJECTION, SOLUTION INTRA-ARTICULAR; INTRALESIONAL; INTRAMUSCULAR; INTRAVENOUS; SOFT TISSUE at 19:49

## 2020-01-01 RX ADMIN — FOLIC ACID 1 MG: 1 TABLET ORAL at 09:08

## 2020-01-01 RX ADMIN — DEXAMETHASONE SODIUM PHOSPHATE 4 MG: 4 INJECTION, SOLUTION INTRA-ARTICULAR; INTRALESIONAL; INTRAMUSCULAR; INTRAVENOUS; SOFT TISSUE at 01:27

## 2020-01-01 RX ADMIN — DEXAMETHASONE SODIUM PHOSPHATE 4 MG: 4 INJECTION, SOLUTION INTRA-ARTICULAR; INTRALESIONAL; INTRAMUSCULAR; INTRAVENOUS; SOFT TISSUE at 16:05

## 2020-01-01 RX ADMIN — PANTOPRAZOLE SODIUM 40 MG: 40 TABLET, DELAYED RELEASE ORAL at 17:24

## 2020-01-01 RX ADMIN — Medication 10 ML: at 21:12

## 2020-01-01 RX ADMIN — MORPHINE SULFATE 2 MG: 2 INJECTION, SOLUTION INTRAMUSCULAR; INTRAVENOUS at 01:20

## 2020-01-01 RX ADMIN — DEXTROSE MONOHYDRATE 100 MG: 50 INJECTION, SOLUTION INTRAVENOUS at 09:46

## 2020-01-01 RX ADMIN — LORAZEPAM 1 MG: 2 INJECTION, SOLUTION INTRAMUSCULAR; INTRAVENOUS at 23:44

## 2020-01-01 RX ADMIN — Medication 15 ML: at 09:07

## 2020-01-01 RX ADMIN — LORAZEPAM 1 MG: 2 INJECTION, SOLUTION INTRAMUSCULAR; INTRAVENOUS at 02:41

## 2020-01-01 RX ADMIN — DEXMEDETOMIDINE 0.2 MCG/KG/HR: 100 INJECTION, SOLUTION, CONCENTRATE INTRAVENOUS at 23:00

## 2020-01-01 RX ADMIN — LORAZEPAM 1 MG: 2 INJECTION, SOLUTION INTRAMUSCULAR; INTRAVENOUS at 14:57

## 2020-01-01 RX ADMIN — LORAZEPAM 1 MG: 2 INJECTION, SOLUTION INTRAMUSCULAR; INTRAVENOUS at 15:18

## 2020-01-01 RX ADMIN — DEXAMETHASONE SODIUM PHOSPHATE 4 MG: 4 INJECTION, SOLUTION INTRAMUSCULAR; INTRAVENOUS at 02:47

## 2020-01-01 RX ADMIN — ACETAMINOPHEN 650 MG: 650 SUPPOSITORY RECTAL at 08:44

## 2020-01-01 RX ADMIN — VALPROATE SODIUM 750 MG: 100 INJECTION, SOLUTION INTRAVENOUS at 16:23

## 2020-01-01 RX ADMIN — LORAZEPAM 4 MG: 2 INJECTION, SOLUTION INTRAMUSCULAR; INTRAVENOUS at 16:00

## 2020-01-01 RX ADMIN — PROPOFOL INJECTABLE EMULSION 45 MCG/KG/MIN: 10 INJECTION, EMULSION INTRAVENOUS at 02:36

## 2020-01-01 RX ADMIN — DEXAMETHASONE 4 MG: 4 TABLET ORAL at 05:45

## 2020-01-01 RX ADMIN — Medication 15 ML: at 20:44

## 2020-01-01 RX ADMIN — QUETIAPINE FUMARATE 50 MG: 25 TABLET ORAL at 20:48

## 2020-01-01 RX ADMIN — DEXTROSE MONOHYDRATE 100 MG: 50 INJECTION, SOLUTION INTRAVENOUS at 09:58

## 2020-01-01 RX ADMIN — LEVETIRACETAM 1000 MG: 500 TABLET, FILM COATED ORAL at 09:05

## 2020-01-01 RX ADMIN — LEVETIRACETAM 1000 MG: 100 INJECTION, SOLUTION INTRAVENOUS at 20:44

## 2020-01-01 RX ADMIN — LORAZEPAM 2 MG: 2 INJECTION INTRAMUSCULAR; INTRAVENOUS at 02:26

## 2020-01-01 RX ADMIN — GLYCOPYRROLATE AND FORMOTEROL FUMARATE 2 PUFF: 9; 4.8 AEROSOL, METERED RESPIRATORY (INHALATION) at 09:05

## 2020-01-01 RX ADMIN — Medication 15 ML: at 20:32

## 2020-01-01 RX ADMIN — LORAZEPAM 0.5 MG: 0.5 TABLET ORAL at 09:05

## 2020-01-01 RX ADMIN — IOPAMIDOL 80 ML: 755 INJECTION, SOLUTION INTRAVENOUS at 05:52

## 2020-01-01 RX ADMIN — HALOPERIDOL LACTATE 1 MG: 5 INJECTION, SOLUTION INTRAMUSCULAR at 21:25

## 2020-01-01 RX ADMIN — DEXAMETHASONE 4 MG: 4 TABLET ORAL at 16:37

## 2020-01-01 RX ADMIN — QUETIAPINE FUMARATE 50 MG: 25 TABLET ORAL at 12:27

## 2020-01-01 RX ADMIN — MORPHINE SULFATE 2 MG: 2 INJECTION, SOLUTION INTRAMUSCULAR; INTRAVENOUS at 09:13

## 2020-01-01 RX ADMIN — PROPOFOL 100 MG: 10 INJECTION, EMULSION INTRAVENOUS at 13:10

## 2020-01-01 RX ADMIN — VALPROATE SODIUM 750 MG: 100 INJECTION, SOLUTION INTRAVENOUS at 01:12

## 2020-01-01 RX ADMIN — PIPERACILLIN AND TAZOBACTAM 3.38 G: 3; .375 INJECTION, POWDER, FOR SOLUTION INTRAVENOUS at 03:23

## 2020-01-01 RX ADMIN — DEXAMETHASONE SODIUM PHOSPHATE 4 MG: 4 INJECTION, SOLUTION INTRA-ARTICULAR; INTRALESIONAL; INTRAMUSCULAR; INTRAVENOUS; SOFT TISSUE at 06:10

## 2020-01-01 RX ADMIN — DEXAMETHASONE 4 MG: 4 TABLET ORAL at 14:44

## 2020-01-01 RX ADMIN — MORPHINE SULFATE 2 MG: 2 INJECTION, SOLUTION INTRAMUSCULAR; INTRAVENOUS at 01:36

## 2020-01-01 RX ADMIN — VALPROATE SODIUM 750 MG: 100 INJECTION, SOLUTION INTRAVENOUS at 00:39

## 2020-01-01 RX ADMIN — LORAZEPAM 1 MG: 2 INJECTION INTRAMUSCULAR; INTRAVENOUS at 13:24

## 2020-01-01 RX ADMIN — PIPERACILLIN AND TAZOBACTAM 3.38 G: 3; .375 INJECTION, POWDER, FOR SOLUTION INTRAVENOUS at 02:37

## 2020-01-01 RX ADMIN — LISINOPRIL 5 MG: 5 TABLET ORAL at 09:08

## 2020-01-01 RX ADMIN — LORAZEPAM 2 MG: 2 INJECTION INTRAMUSCULAR; INTRAVENOUS at 14:12

## 2020-01-01 RX ADMIN — VALPROATE SODIUM 750 MG: 100 INJECTION, SOLUTION INTRAVENOUS at 01:27

## 2020-01-01 RX ADMIN — ROCURONIUM BROMIDE 100 MG: 10 INJECTION, SOLUTION INTRAVENOUS at 15:38

## 2020-01-01 RX ADMIN — LEVETIRACETAM 500 MG: 100 INJECTION, SOLUTION INTRAVENOUS at 21:36

## 2020-01-01 RX ADMIN — SODIUM CHLORIDE: 9 INJECTION, SOLUTION INTRAVENOUS at 14:12

## 2020-01-01 RX ADMIN — Medication 15 ML: at 08:00

## 2020-01-01 RX ADMIN — INSULIN LISPRO 1 UNITS: 100 INJECTION, SOLUTION INTRAVENOUS; SUBCUTANEOUS at 02:42

## 2020-01-01 RX ADMIN — LEVETIRACETAM 1000 MG: 500 TABLET, FILM COATED ORAL at 10:11

## 2020-01-01 RX ADMIN — LEVETIRACETAM 1000 MG: 500 TABLET, FILM COATED ORAL at 08:04

## 2020-01-01 RX ADMIN — MORPHINE SULFATE 2 MG: 2 INJECTION, SOLUTION INTRAMUSCULAR; INTRAVENOUS at 06:06

## 2020-01-01 RX ADMIN — LORAZEPAM 2 MG: 2 INJECTION INTRAMUSCULAR; INTRAVENOUS at 05:27

## 2020-01-01 RX ADMIN — PANTOPRAZOLE SODIUM 40 MG: 40 TABLET, DELAYED RELEASE ORAL at 05:45

## 2020-01-01 RX ADMIN — MORPHINE SULFATE 30 MG: 1 INJECTION INTRAVENOUS at 08:15

## 2020-01-01 RX ADMIN — DEXTROSE MONOHYDRATE 100 MG: 50 INJECTION, SOLUTION INTRAVENOUS at 22:53

## 2020-01-01 RX ADMIN — FOLIC ACID: 5 INJECTION, SOLUTION INTRAMUSCULAR; INTRAVENOUS; SUBCUTANEOUS at 11:03

## 2020-01-01 RX ADMIN — MORPHINE SULFATE 2 MG: 2 INJECTION, SOLUTION INTRAMUSCULAR; INTRAVENOUS at 08:36

## 2020-01-01 RX ADMIN — DEXAMETHASONE SODIUM PHOSPHATE 4 MG: 4 INJECTION, SOLUTION INTRAMUSCULAR; INTRAVENOUS at 03:35

## 2020-01-01 RX ADMIN — LEVETIRACETAM 1000 MG: 100 INJECTION, SOLUTION INTRAVENOUS at 15:33

## 2020-01-01 RX ADMIN — DEXAMETHASONE 4 MG: 4 TABLET ORAL at 21:21

## 2020-01-01 RX ADMIN — ETOMIDATE 20 MG: 2 INJECTION INTRAVENOUS at 15:37

## 2020-01-01 RX ADMIN — DEXAMETHASONE SODIUM PHOSPHATE 4 MG: 4 INJECTION, SOLUTION INTRA-ARTICULAR; INTRALESIONAL; INTRAMUSCULAR; INTRAVENOUS; SOFT TISSUE at 00:33

## 2020-01-01 RX ADMIN — LORAZEPAM 2 MG: 2 INJECTION INTRAMUSCULAR; INTRAVENOUS at 03:35

## 2020-01-01 RX ADMIN — PROPOFOL 100 MG: 10 INJECTION, EMULSION INTRAVENOUS at 13:11

## 2020-01-01 RX ADMIN — LISINOPRIL 5 MG: 5 TABLET ORAL at 10:11

## 2020-01-01 RX ADMIN — Medication 100 MG: at 09:08

## 2020-01-01 RX ADMIN — GLYCOPYRROLATE 0.2 MG: 0.2 INJECTION, SOLUTION INTRAMUSCULAR; INTRAVENOUS at 05:01

## 2020-01-01 RX ADMIN — PROPOFOL INJECTABLE EMULSION 35 MCG/KG/MIN: 10 INJECTION, EMULSION INTRAVENOUS at 05:00

## 2020-01-01 RX ADMIN — PANTOPRAZOLE SODIUM 40 MG: 40 INJECTION, POWDER, FOR SOLUTION INTRAVENOUS at 08:37

## 2020-01-01 RX ADMIN — DEXAMETHASONE SODIUM PHOSPHATE 4 MG: 4 INJECTION, SOLUTION INTRA-ARTICULAR; INTRALESIONAL; INTRAMUSCULAR; INTRAVENOUS; SOFT TISSUE at 11:07

## 2020-01-01 RX ADMIN — FAMOTIDINE 20 MG: 20 TABLET ORAL at 20:39

## 2020-01-01 RX ADMIN — LORAZEPAM 2 MG: 2 INJECTION INTRAMUSCULAR; INTRAVENOUS at 10:00

## 2020-01-01 SDOH — HEALTH STABILITY: MENTAL HEALTH: HOW MANY STANDARD DRINKS CONTAINING ALCOHOL DO YOU HAVE ON A TYPICAL DAY?: 1 OR 2

## 2020-01-01 ASSESSMENT — PAIN SCALES - WONG BAKER
WONGBAKER_NUMERICALRESPONSE: 6
WONGBAKER_NUMERICALRESPONSE: 6
WONGBAKER_NUMERICALRESPONSE: 0
WONGBAKER_NUMERICALRESPONSE: 6
WONGBAKER_NUMERICALRESPONSE: 0
WONGBAKER_NUMERICALRESPONSE: 6
WONGBAKER_NUMERICALRESPONSE: 0
WONGBAKER_NUMERICALRESPONSE: 0
WONGBAKER_NUMERICALRESPONSE: 6

## 2020-01-01 ASSESSMENT — PAIN SCALES - GENERAL
PAINLEVEL_OUTOF10: 0
PAINLEVEL_OUTOF10: 5
PAINLEVEL_OUTOF10: 0
PAINLEVEL_OUTOF10: 5
PAINLEVEL_OUTOF10: 0
PAINLEVEL_OUTOF10: 5
PAINLEVEL_OUTOF10: 5
PAINLEVEL_OUTOF10: 0
PAINLEVEL_OUTOF10: 4
PAINLEVEL_OUTOF10: 0
PAINLEVEL_OUTOF10: 6
PAINLEVEL_OUTOF10: 6
PAINLEVEL_OUTOF10: 0

## 2020-01-01 ASSESSMENT — ENCOUNTER SYMPTOMS
HEMATOCHEZIA: 1
SWOLLEN GLANDS: 1
BACK PAIN: 0
SORE THROAT: 0
SORE THROAT: 0
SHORTNESS OF BREATH: 0
ROS SKIN COMMENTS: ITCHING
SHORTNESS OF BREATH: 0
RECTAL PAIN: 0
TROUBLE SWALLOWING: 0
SHORTNESS OF BREATH: 0
PHOTOPHOBIA: 0
HEMATEMESIS: 0
VOMITING: 0
NAUSEA: 0
COUGH: 1
NAUSEA: 1
COLOR CHANGE: 1
VOMITING: 1
CHEST TIGHTNESS: 0
NAUSEA: 0
RHINORRHEA: 0
COLOR CHANGE: 0
BACK PAIN: 0
VOMITING: 0
SORE THROAT: 0
ABDOMINAL PAIN: 0
COUGH: 0
WHEEZING: 0
DIARRHEA: 0
RHINORRHEA: 0
ABDOMINAL PAIN: 0

## 2020-01-01 ASSESSMENT — PULMONARY FUNCTION TESTS
PIF_VALUE: 19
PIF_VALUE: 15
PIF_VALUE: 21
PIF_VALUE: 17
PIF_VALUE: 15
PIF_VALUE: 16
PIF_VALUE: 17
PIF_VALUE: 21
PIF_VALUE: 17
PIF_VALUE: 21
PIF_VALUE: 18
PIF_VALUE: 13
PIF_VALUE: 13
PIF_VALUE: 11
PIF_VALUE: 19
PIF_VALUE: 15
PIF_VALUE: 19
PIF_VALUE: 19
PIF_VALUE: 15
PIF_VALUE: 19
PIF_VALUE: 21
PIF_VALUE: 15
PIF_VALUE: 17

## 2020-01-01 ASSESSMENT — PATIENT HEALTH QUESTIONNAIRE - PHQ9
4. FEELING TIRED OR HAVING LITTLE ENERGY: 3
SUM OF ALL RESPONSES TO PHQ QUESTIONS 1-9: 9
5. POOR APPETITE OR OVEREATING: 0
SUM OF ALL RESPONSES TO PHQ9 QUESTIONS 1 & 2: 3
1. LITTLE INTEREST OR PLEASURE IN DOING THINGS: 3
8. MOVING OR SPEAKING SO SLOWLY THAT OTHER PEOPLE COULD HAVE NOTICED. OR THE OPPOSITE, BEING SO FIGETY OR RESTLESS THAT YOU HAVE BEEN MOVING AROUND A LOT MORE THAN USUAL: 0
10. IF YOU CHECKED OFF ANY PROBLEMS, HOW DIFFICULT HAVE THESE PROBLEMS MADE IT FOR YOU TO DO YOUR WORK, TAKE CARE OF THINGS AT HOME, OR GET ALONG WITH OTHER PEOPLE: 0
7. TROUBLE CONCENTRATING ON THINGS, SUCH AS READING THE NEWSPAPER OR WATCHING TELEVISION: 0
SUM OF ALL RESPONSES TO PHQ QUESTIONS 1-9: 9
2. FEELING DOWN, DEPRESSED OR HOPELESS: 0
6. FEELING BAD ABOUT YOURSELF - OR THAT YOU ARE A FAILURE OR HAVE LET YOURSELF OR YOUR FAMILY DOWN: 0
9. THOUGHTS THAT YOU WOULD BE BETTER OFF DEAD, OR OF HURTING YOURSELF: 0
3. TROUBLE FALLING OR STAYING ASLEEP: 3

## 2020-01-01 ASSESSMENT — PAIN DESCRIPTION - LOCATION
LOCATION: BACK
LOCATION: HEAD

## 2020-01-01 ASSESSMENT — PAIN DESCRIPTION - FREQUENCY: FREQUENCY: CONTINUOUS

## 2020-01-01 ASSESSMENT — PAIN DESCRIPTION - PROGRESSION: CLINICAL_PROGRESSION: GRADUALLY WORSENING

## 2020-01-01 ASSESSMENT — PAIN DESCRIPTION - DESCRIPTORS
DESCRIPTORS: PATIENT UNABLE TO DESCRIBE
DESCRIPTORS: PATIENT UNABLE TO DESCRIBE

## 2020-01-01 ASSESSMENT — PAIN - FUNCTIONAL ASSESSMENT
PAIN_FUNCTIONAL_ASSESSMENT: PREVENTS OR INTERFERES WITH ALL ACTIVE AND SOME PASSIVE ACTIVITIES
PAIN_FUNCTIONAL_ASSESSMENT: PREVENTS OR INTERFERES SOME ACTIVE ACTIVITIES AND ADLS

## 2020-01-01 ASSESSMENT — PAIN DESCRIPTION - PAIN TYPE
TYPE: ACUTE PAIN
TYPE: ACUTE PAIN

## 2020-01-01 ASSESSMENT — PAIN DESCRIPTION - ONSET: ONSET: ON-GOING

## 2020-02-10 PROBLEM — R56.9 SEIZURES (HCC): Status: ACTIVE | Noted: 2020-01-01

## 2020-02-10 NOTE — ED NOTES
Nurse and Harpreet TREVIÑO was called to bedside due to patient having a seizure. Harpreet TREVIÑO verbal ordered 1mg of ativan. IV access was established. Patient's wife stated patient has been having seizures today approx 7. Patient's wife stated patient drinks alcohol for approx 32 years, a lot of it. Patient's wife stated patient went outside to leave and came back in the house and fell.       Jereld Cabot, RN  02/10/20 0159

## 2020-02-10 NOTE — ED NOTES
MUNA Jenkins at bedside to help with transfer to MRI, she found patient's pulse ox to 70's and going down, she then woke pt up with sternal rub and pulse ox went up. This nurse increase O2 to 6L per NC.       Sheria Apgar, BHASKAR  02/10/20 [id number]

## 2020-02-10 NOTE — ED PROVIDER NOTES
[address]          CHIEF COMPLAINT       Chief Complaint   Patient presents with    Seizures    Hypertension       Nurses Notes reviewed and I agree except as noted inthe HPI. HISTORY OF PRESENT ILLNESS    Laney Diane is a 53 y.o. male who presents to the Emergency Department for evaluation following several seizures. The patient was initially evaluated in [facility]. The patient has a history of TBI in in 2009. Patient has a history of significant alcohol abuse over the past 32 years. Per wife, patient's last alcoholic beverage was 2 years ago. Wife reports patient having a total of 8 seizures at home this morning. Prior to the first seizure, the patient had walked outside, came back in, and then fell to the ground seizing. Patient is confused and disoriented in between episodes. Patient had an additional two seizures while in our department. CT head was ordered while patient was in [facility] revealing multiple mixed hyperdense/hemorrhagic lesions in the left temporoparietal lobes concerning for neoplasm. Largest in the temporal lobe is 3.1 x 2.3 with central hypoattenuation. Largest in the high parietal lobe is 3.2 x 2 cm and is uniformly hyperdense. MRI was ordered and is pending at this time. Ativan has been administered. Patient was moved up to higher acuity of the department for continued seizures and periods of hypoxia. REVIEW OF SYSTEMS     Review of Systems   Unable to perform ROS: Acuity of condition       PAST MEDICAL HISTORY    has a past medical history of Seizures ([address] and TBI (traumatic brain injury) ([address] SURGICAL HISTORY      has a past surgical history that includes craniotomy. CURRENT MEDICATIONS       Current Discharge Medication List      CONTINUE these medications which have NOT CHANGED    Details   acetaminophen (TYLENOL) 500 MG tablet Take 500 mg by mouth every 6 hours as needed.                   has No Known Allergies. FAMILY HISTORY     has no family status information on file. family history is not on file. SOCIAL HISTORY      reports that he has been smoking cigarettes. He has been smoking about 1.50 packs per day. He has never used smokeless tobacco. He reports current alcohol use of about 36.0 standard drinks of alcohol per week. He reports that he does not use drugs. PHYSICAL EXAM     INITIAL VITALS:  height is 5' 10\" (1.778 m) and weight is 205 lb 14.6 oz (93.4 kg). His bladder temperature is 95.7 °F (35.4 °C). His blood pressure is 94/69 and his pulse is 74. His respiration is 12 and oxygen saturation is 96%. Physical Exam  Vitals signs and nursing note reviewed. Constitutional:       Appearance: He is well-developed. He is not diaphoretic. Interventions: Nasal cannula in place. Comments: Patient intermittently combative    HENT:      Head: Normocephalic and atraumatic. Right Ear: External ear normal.      Left Ear: External ear normal.   Eyes:      Conjunctiva/sclera:      Right eye: Right conjunctiva is injected. Left eye: Left conjunctiva is injected. Neck:      Musculoskeletal: Normal range of motion and neck supple. Vascular: No JVD. Pulmonary:      Effort: Pulmonary effort is normal. No respiratory distress. Breath sounds: No stridor. Comments: Snoring respirations   Abdominal:      General: There is no distension. Palpations: Abdomen is soft. Musculoskeletal: Normal range of motion. Skin:     General: Skin is warm and dry. Findings: No erythema. Neurological:      Mental Status: He is unresponsive. DIFFERENTIAL DIAGNOSIS:   Including but not limited to seizure, metastatic brain cancer, acute respiratory failure, alcohol withdrawal, DTs    RESULTS     EKG: All EKG's are interpreted by the Emergency Department Physician who either signs or Co-signs this chart in the absence of acardiologist.    Vent.  Rate: 100 bpm  OR interval: 160 ms  QRS duration:451 ms  QTc: 451 ms  P-R-T axes: 49, -42, 58  NSR. Left axis deviation. No STEMI  Compared to old EKG on 10-Aug-2009      RADIOLOGY: non-plain film images(s) such as CT, Ultrasound and MRI are read by the radiologist.    MRI Brain W WO Contrast         XR CHEST PORTABLE   Final Result   1. Endotracheal tube and NG tube in good position. 2. Bibasilar atelectasis can't exclude infiltrate. **This report has been created using voice recognition software. It may contain minor errors which are inherent in voice recognition technology. **      Final report electronically signed by Dr. Alma Lynch MD on 2/10/2020 4:11 PM      CT HEAD WO CONTRAST   Final Result   Multiple mixed hyperdense/hemorrhagic lesions in the left temporoparietal lobes concerning for neoplasm. Largest in the temporal lobe is 3.1 x 2.3 with central hypoattenuation. Largest in the high parietal lobe is 3.2 x 2 cm and is uniformly hyperdense. Infection is thought to be less likely. Further evaluation with contrast-enhanced brain MRI is advised. No midline shift at this time however there is a fair amount of associated edema surrounding individual lesions. These results were communicated directly with Maryellen Estrada, [facility] on 2/10/2020 2:46 PM,  [ ]         **This report has been created using voice recognition software. It may contain minor errors which are inherent in voice recognition technology. **      Final report electronically signed by Dr. Himanshu Ny on 2/10/2020 2:48 PM          LABS:   Labs Reviewed   CBC WITH AUTO DIFFERENTIAL - Abnormal; Notable for the following components:       Result Value    WBC 16.9 (*)     Hematocrit 52.2 (*)     .0 (*)     MCH 33.7 (*)     RDW-SD 50.4 (*)     Segs Absolute 13.5 (*)     Immature Grans (Abs) 0.12 (*)     All other components within normal limits   COMPREHENSIVE METABOLIC PANEL - Abnormal; Notable for the following components:    Glucose 186 (*) Chloride 97 (*)     CO2 12 (*)     AST 60 (*)     Total Protein 8.2 (*)     Total Bilirubin 0.2 (*)      (*)     All other components within normal limits   SALICYLATE LEVEL - Abnormal; Notable for the following components:    Salicylate, Serum < 0.3 (*)     All other components within normal limits   AMMONIA - Abnormal; Notable for the following components:    Ammonia 179 (*)     All other components within normal limits   ANION GAP - Abnormal; Notable for the following components:    Anion Gap 26.0 (*)     All other components within normal limits   GLOMERULAR FILTRATION RATE, ESTIMATED - Abnormal; Notable for the following components:    Est, Glom Filt Rate 80 (*)     All other components within normal limits   OSMOLALITY - Abnormal; Notable for the following components:    Osmolality Calc 274.7 (*)     All other components within normal limits   BLOOD GAS, ARTERIAL - Abnormal; Notable for the following components:    pH, Blood Gas 7.28 (*)     PO2 68 (*)     HCO3 19 (*)     Base Excess (Calculated) -7.7 (*)     All other components within normal limits   POCT GLUCOSE - Abnormal; Notable for the following components:    POC Glucose 155 (*)     All other components within normal limits   POCT GLUCOSE - Abnormal; Notable for the following components:    POC Glucose 167 (*)     All other components within normal limits   MRSA SCREENING CULTURE ONLY   VRE SCREEN BY PCR   URINE CULTURE   RESPIRATORY CULTURE    Narrative:     Source: endotracheal tube       Site:           Current Antibiotics: not stated   TROPONIN   PROTIME-INR   ETHANOL   ACETAMINOPHEN LEVEL   URINE DRUG SCREEN   LIPASE   MRSA BY PCR       EMERGENCY DEPARTMENT COURSE:   Vitals:    Vitals:    02/10/20 1905 02/10/20 1910 02/10/20 2002 02/10/20 2040   BP: 86/68 88/65 94/69    Pulse: 81 81 77 74   Resp: 12 12 12 12   Temp:   95.7 °F (35.4 °C)    TempSrc:   Bladder    SpO2:   95% 96%   Weight:       Height:           MDM     CRITICAL CARE:   The

## 2020-02-10 NOTE — ED NOTES
1524- Pt moved to room 2, Dr. Juni Armstrong at bedside to assess patient Kenny Farfan from ICU at bedside. 32 61 16- preparing pt for intubation by Dr. Titi Lora with Sonal RN, [address] 46 and respiratory at bedside. 1537- Etomidate 20mg given HR is 113 and pulse ox 94% being bagged by respiratory at this time. 1538- 100mg of Rocuronium given    1539- intubation in process by Dr. Titi Lora /107  pulse ox 97%. 1542- color change, pt is noted to have a lot of oral secretions that is being suctioned. ET tube is 7.5 and 24 shara at teeth. 1544-  pulse ox 94% /112     1548 OG tube placed by Sonal RN 18F and at 60 at the lip.     1552- Respiratory at beside and cuff is leaking Dr. Titi Lora at bedside to assess ET tube and noted the cuff leak at this time removing this tube and getting new placed. Pulse ox is 97%. [facility]- Dr. Titi Lora changed ED tube to 8 and 26 at lip, OG tube was readjusted by Sonal MURO and is clamped at 59 lip, with red secretions. 1601- Xray at bedside to verified placement. 1634- pt propofol gtt was increased to 30mcg/kg/min and going to MRI    1636- pt is in MRI, pt was bolused at 50mg at this time with propofol due to starting to pull at lines while suctioning airway. 1648- increased propofol at 40     1655- HR 90 pulse ox 100% /80 pt was bolused propofol at 50 for increase in agitation. 1700- in MRI     1705- Propofol gtt increased to 50 min and bolused 50 due to agitation. 1725- 10mg of vecuronium was given to help pt lay still and not pull at tubes during MRI     1726- MRI was started pulse 81 pulse ox 97% on vent. 1729- /64    1739- MRI completed and ICU charge at bedside to help transport to ICU.      Wiley Zarate, RN  02/10/20 [address] [facility], RN  02/10/20 [address] [facility], RN  02/10/20 [address] [facility], RN  02/10/20 6081

## 2020-02-10 NOTE — ED PROVIDER NOTES
[facility] EMERGENCY DEPT      CHIEF COMPLAINT       Chief Complaint   Patient presents with    Seizures    Hypertension       Nurses Notes reviewed and I agree except as noted in the HPI. HISTORY OF PRESENT ILLNESS    Mala Carey is a 53 y.o. male who presents for seizures onset this morning, rating his current pain 7/10 in severity. Patient's wife reports the seizures onset this morning. She states the patient walked outside, came back inside, and then fell to the ground seizing. Patient's wife reports the patient has experienced 8 seizures since this morning. She states the 7 of the seizures affected the right side of his body, but the last seizure was the most intense and affected his entire body. Patient's wife denies any injuries occurring and denies that he hit his head with any of the seizures. The wife reports the patient's body will stiffen and jerk when he is seizures. They patient's mother reports episodes of vomiting in between seizures. Family states the patient will come out of his seizures confused and unable to recognize faces. Patient wife denies fever, headache, visual disturbances, and itching reported prior to seizures. Wife denies congestion, cough, ear pain, rhinorrhea, and sore throat prior to seizures. She denies chest pain shortness of breath, and diaphoresis. Patient's wife reports the patient was ordered to stop drinking by his . She reports his last drink was 2 days ago. She states that the patient drank \" a lot of beer\" but was unable to give an amount. She reports that \"if we have beer in the house he drinks it\". She denies use of illicit drugs. Wife denies past history of seizure. Patient has a past medical history of TBI that occurred in 2009. There are no other complaints, symptoms, or concerns on initial encounter.     Location/Symptom: seizures   Timing/Onset: this morning   Context/Setting: at home   Quality: stiff and jerking   Duration: 8 seizures PTA Modifying Factors: not stated  Severity: 7/10    HPI provided by the patient's wife and mother. REVIEW OF SYSTEMS     Review of Systems   Constitutional: Negative for diaphoresis, fatigue and fever. HENT: Negative for congestion, ear pain, rhinorrhea and sore throat. Eyes: Negative for visual disturbance. Respiratory: Negative for cough and shortness of breath. Cardiovascular: Negative for chest pain. Gastrointestinal: Positive for nausea and vomiting. Skin:        Itching    Neurological: Positive for seizures. Psychiatric/Behavioral: Positive for confusion. PAST MEDICAL HISTORY    has a past medical history of TBI (traumatic brain injury) [address] [address] SURGICAL HISTORY      has a past surgical history that includes craniotomy. CURRENT MEDICATIONS       Previous Medications    ACETAMINOPHEN (TYLENOL) 500 MG TABLET    Take 500 mg by mouth every 6 hours as needed. CEPHALEXIN (KEFLEX) 500 MG CAPSULE    Take 1 capsule by mouth 4 times daily. CYCLOBENZAPRINE (FLEXERIL) 10 MG TABLET    Take 1 tablet by mouth nightly as needed for Muscle spasms. HYDROCORTISONE 2.5 % OINTMENT    Apply topically 2 times daily for eczema treatment. TRAZODONE (DESYREL) 50 MG TABLET    Take 50 mg by mouth nightly. ALLERGIES     has No Known Allergies. FAMILY HISTORY     has no family status information on file. family history is not on file. SOCIAL HISTORY    reports that he has been smoking cigarettes. He has been smoking about 1.50 packs per day. He does not have any smokeless tobacco history on file. He reports current alcohol use of about 36.0 standard drinks of alcohol per week. He reports that he does not use drugs. PHYSICAL EXAM     INITIAL VITALS:  height is 5' 10\" (1.778 m) and weight is 200 lb (90.7 kg). His oral temperature is 98.5 °F (36.9 °C). His blood pressure is 223/141 (abnormal) and his pulse is 101. His respiration is 13 and oxygen saturation is 98%.     Physical Exam  Vitals signs and nursing note reviewed. Constitutional:       Appearance: Normal appearance. He is well-developed. He is obese. He is not toxic-appearing. Comments: Patient actively seizing on initial encounter   HENT:      Head: Normocephalic and atraumatic. Right Ear: Hearing, tympanic membrane and ear canal normal. No hemotympanum. Left Ear: Hearing, tympanic membrane and ear canal normal. No hemotympanum. Nose: Nose normal.      Mouth/Throat:      Pharynx: Uvula midline. Eyes:      General: Lids are normal.      Conjunctiva/sclera:      Right eye: Right conjunctiva is injected. Left eye: Left conjunctiva is injected. Pupils: Pupils are equal, round, and reactive to light. Neck:      Musculoskeletal: Normal range of motion and neck supple. No neck rigidity. Vascular: No JVD. Trachea: Trachea normal. No tracheal deviation. Cardiovascular:      Rate and Rhythm: Normal rate and regular rhythm. Heart sounds: Normal heart sounds. No murmur. Pulmonary:      Effort: Pulmonary effort is normal.      Breath sounds: Normal breath sounds. Comments: Snoring respirations   Abdominal:      General: There is no distension. Palpations: Abdomen is soft. Abdomen is not rigid. Tenderness: There is no abdominal tenderness. There is no guarding. Musculoskeletal: Normal range of motion. Comments: Extremities well perfused; good strength appreciated in all muscle groups. No signs of DVT. Lymphadenopathy:      Cervical: No cervical adenopathy. Skin:     General: Skin is warm and dry. Findings: No rash. Neurological:      Mental Status: He is confused and unresponsive. Motor: Seizure activity present. Comments: Unable to test due to seizing and altered mental status   Psychiatric:         Attention and Perception: He is inattentive. Speech: He is noncommunicative. Behavior: Behavior is combative (intermittently ). Comments: Altered mental status; not following comands         DIFFERENTIAL DIAGNOSIS:   Including but not limited to: alcohol withdrawal, ICH, metabolic derangement, encephalopathy     DIAGNOSTIC RESULTS     EKG: All EKG's are interpreted by theEmergency Department Physician who either signs or Co-signs this chart in the absence of a cardiologist.  Collection Time Result Time Ventricular Rate Atrial Rate P-R Interval QRS Duration Q-T Interval   02/10/20 13:11:21 02/10/20 13:42:41 100 100 160 100 350       Collection Time Result Time QTc Calculation (Bazett) P Axis R Axis T Axis   02/10/20 13:11:21 02/10/20 13:42:41 451 49 -42 58       Preliminary result                Narrative:    Normal sinus rhythm  Left axis deviation  Abnormal ECG  When compared with ECG of 10-AUG-2009 09:46,  The axis Shifted left  Nonspecific T wave abnormality no longer evident in Inferior leads  T wave inversion no longer evident in Lateral leads            No STEMI     RADIOLOGY: non-plain film images(s) such as CT,Ultrasound and MRI are read by the radiologist.  Plain radiographic images are visualized and preliminarily interpreted by the emergency physician unless otherwise stated below. CT HEAD WO CONTRAST   Final Result   Multiple mixed hyperdense/hemorrhagic lesions in the left temporoparietal lobes concerning for neoplasm. Largest in the temporal lobe is 3.1 x 2.3 with central hypoattenuation. Largest in the high parietal lobe is 3.2 x 2 cm and is uniformly hyperdense. Infection is thought to be less likely. Further evaluation with contrast-enhanced brain MRI is advised. No midline shift at this time however there is a fair amount of associated edema surrounding individual lesions. These results were communicated directly with Leonor Key, [facility] on 2/10/2020 2:46 PM,  [ ]         **This report has been created using voice recognition software.  It may contain minor errors which are inherent in voice recognition within normal limits   TROPONIN   PROTIME-INR   ETHANOL   ACETAMINOPHEN LEVEL   URINE DRUG SCREEN   LIPASE       EMERGENCY DEPARTMENT COURSE:   Vitals:    Vitals:    02/10/20 1508 02/10/20 1525 02/10/20 1555 02/10/20 1603   BP: 125/77 125/77  (!) 223/141   Pulse: 123 127 133 101   Resp: 14 14 17 13   Temp:       TempSrc:       SpO2: 97% 94% 98% 98%   Weight:       Height:         1320: Patient is seen and evaluated. 1302: POCT glucose ordered. 1333: POCT glucose ordered. 1337: Ativan ordered. 1338: Ativan ordered. 1341: EKG, Labs and IV fluids ordered. 1342: CT head ordered. 1439: Patient experienced another seizure. 1448: Dr. Charito Payne (radiologist) contacted me and reported the patient has hemorraghic brain lesions. It was suggested the patient received and MRI with and without contrast.    CT head showed Multiple mixed hyperdense/hemorrhagic lesions in the left temporoparietal lobes concerning for neoplasm. Largest in the temporal lobe is 3.1 x 2.3 with central hypoattenuation. Largest in the high parietal lobe is 3.2 x 2 cm and is uniformly hyperdense. Infection is thought to be less likely. Further evaluation with contrast-enhanced brain MRI is advised. No midline shift at this time however there is a fair amount of associated edema surrounding individual lesions. MRI ordered. 1449: Ativan ordered. 1513: Keppra ordered. Regina 19: I paged Dr. Azalea Magdaleno ([phone]) in regards to the patient for his progressive seizures and desaturation. He accepted the patient for admission. 1510: The patient was moved to room 2 in red [facility] due to continued seizures with periods of hypoxia. The patient needed intubated so transfer of care was given to Dr. Luly Guerrero. Please see his note for intubation and further care. MDM:  The patient was seen by me in the emergency room for seizure. I was called to the room due to active seizure-like activity.  The patient had snoring

## 2020-02-10 NOTE — ED NOTES
Pt had seizure like activity last about 1 1/2 minutes in ER lobby. Pt stiffening up and shaking. my self an family help pt up in wheel chair. Pt came to directly after. Requested help out to lobby.      Mili Guzmán LPN  07/24/46 4895

## 2020-02-10 NOTE — ED PROVIDER NOTES
Physician Note:    Patient was seen by YAMEL Hercules and I co-managed the case    I have personally performed and participated in the history, exam and medical decision making and agree with all pertinent clinical information. I have also reviewed and agree with the past medical, family and social history unless otherwise noted. Patient presented to the emergency room due to seizure activity since 9:00 this morning. The wife relates that he is alcoholic drinks beer and whiskey and he stopped drinking 2 days ago cold turkey. Patient has been doing well yesterday and today has not acting well and he has some weakness initially on the right upper and lower extremity and followed by seizures    Patient had history of melanoma on his back, December 2018, had excision subsequently on his back    Physical examination showed somnolent when seen due to Ativan given. Patirnt does not have any weakness to move the arms and legs turned around in the table, no facial asymmetry,    Evaluation: Agree above , seen the patient and discussed the diagnosis and treatment plans     FINAL IMPRESSION       1. Seizures ([address]    2. Brain neoplasm ([address]    3.  History of alcohol use disorder            DISPOSITION/PLAN  PATIENT REFERRED TO: Patient is referred to the intensivist /  hospitalist for admission      Adele Clark MD      Emergency room physician        Adele Clark MD  02/22/20 1865

## 2020-02-11 NOTE — PROGRESS NOTES
The transport originated from ICU. Pt. was transported to CT. Assisting with the transport was Nai MURO. A defibrillator was brought along on transport. Appropriate devices were applied to monitor the patient's condition during transport. A transport backpack was brought on transport, to be used in case of emergency. Patient transported  via ventilator 40%. Patient tolerated procedure well.

## 2020-02-11 NOTE — FLOWSHEET NOTE
Pt was unable to respond but he was blessed. 02/11/20 1524   Encounter Summary   Services provided to: Patient and family together   Referral/Consult From: [address] [place] [address]; Family members   Continue Visiting Yes  (2/11 NR)   Complexity of Encounter Low   Length of Encounter 15 minutes   Routine   Type Initial   Assessment Unable to respond   Intervention Prayer

## 2020-02-11 NOTE — CONSULTS
Consults     Attending note:     Patient seen and examined in the ICU conjunction with neurosurgery HUDSON Vogt PA-C). Discussed with patients  Family and ICU team as well. All data and imaging reviewed by myself. I agree with examination assessment and plan as documented below. Please See my additional comments below for updated orders and plan. -This is a 53 years old Male who presented to ER yesterday because of multiple seizures as associated with a decline in the level of the consciousness. While the patient was in the ER he became hypoxic and failed to protect his airway and as a results, patient was admitted and intubated.  -He underwent brain MRI that showed multiple brain lesions consistent with multiple brain metastatic lesion.    -In today Neurological exam: pt was sedated and intubated GCS 3T, pupils: equal and sluggish to light, no corneal reflex but has cough reflex. - Patient has a history of melanoma resected about 1 year ago.    -Karnofsky performance score: ( very low). -Given patient current neurological status, radiological features of his brain MRI (lesions) current Karnofsky performance score, I do not believe that the  patient is a good candidate for neurosurgical intervention (unless a biopsy is requested by other treatment team). -However, at this time and before finalize the neurosurgical recommendation and treatment plan from neurosurgical perspective, I will request for the patient:  · CT chest /abdomen and pelvis out any primary and staging. · Dexamethasone 4 cc  every 6 hours. · Seizure control per neurology recommendation. · Radiation and medical oncology consultations. · Supportive medical treatment and per ICU team.   - I will discuss the case case with other treatment teams to deliver the final recommendation and a multi-disciplinary approach  - Neurosurgery will follow. Yuridia Clayton MD    *Time spent with the patient was 35  minutes.  More than 50%  insulin lispro (HUMALOG) injection vial 0-6 Units  0-6 Units Subcutaneous Q4H Rae Emerson, APRN - CNP   1 Units at 02/11/20 0242    glucose (GLUTOSE) 40 % oral gel 15 g  15 g Oral PRN Rae Emerson, APRN - CNP        dextrose 50 % IV solution  12.5 g Intravenous PRN Rae Emerson, APRN - CNP        glucagon (rDNA) injection 1 mg  1 mg Intramuscular PRN Rae Emerson, APRN - CNP        dextrose 5 % solution  100 mL/hr Intravenous PRN Rae Downey APRN - CNP            fentanNYL, 25 mcg, Q1H PRN  glucose, 15 g, PRN  dextrose, 12.5 g, PRN  glucagon (rDNA), 1 mg, PRN  dextrose, 100 mL/hr, PRN         propofol 45 mcg/kg/min (02/11/20 0236)    dexmedetomidine 0.4 mcg/kg/hr (02/11/20 0000)    dextrose           ALLERGIES:   Patient has no known allergies. PAST MEDICAL  HISTORY:    has a past medical history of Seizures ([facility] [address] and TBI (traumatic brain injury) ([facility] [address] PAST SURGICAL  HISTORY:    has a past surgical history that includes craniotomy. SOCIAL HISTORY:   Social History     Tobacco Use    Smoking status: Current Every Day Smoker     Packs/day: 1.50     Types: Cigarettes    Smokeless tobacco: Never Used   Substance Use Topics    Alcohol use: Yes     Alcohol/week: 36.0 standard drinks     Types: 36 Cans of beer per week     Comment: pt states 12 pack a week       FAMILY HISTORY:  History reviewed. No pertinent family history. LABS  None    RADIOLOGY:  Pertinent images have been reviewed    EXAMINATION:  Physical Exam    Cough reflex as demonstrated by nursing. Pupils were pinpoint and nonreactive and there was no active corneal reflex. Of sedation and intubation limits any further detail in the physical exam.  It should be noted that the patient was unresponsive to painful stimuli.     Ken Sellers PA-C  Electronically signed 2/11/2020 at 3397

## 2020-02-11 NOTE — PLAN OF CARE
Nutrition Problem: Inadequate oral intake  Intervention: Food and/or Nutrient Delivery: (  If pt continues intubated &  when able to use gut, recommend Vital 1.2 TF with goal of 45 ml/hr & flush 1 ( 2.5 oz) liquid protein daily. to provide 1400 kcals TF ( 2065 kcals with diprivan) & 107 grams protein)  Nutritional Goals: TF to provide % of nutrient needs while pt is intubated.

## 2020-02-11 NOTE — PROGRESS NOTES
Radiation Oncology Consultation  Encounter Date: 2020 4:40 PM    Mr. Jeet Pham is a 53 y.o. male  : 1975  MRN: [id number]  [facility]  Requesting Provider: MARION Thomas    Reason for request: metastatic melanoma      CONSULTANT: Lan Ortiz      CHIEF COMPLAINT: C79.31 -- Secondary malignant neoplasm of brain, related to:  C43.59 -- Malignant melanoma of the of other part of trunk (upper back); melanoma, nodular type; pT4b cN0 at diagnosis,, now M1d Stage IV. HISTORY OF PRESENT ILLNESS:   Silvia Moore is a 57-year-old male who was diagnosed with a pigmented lesion on his upper back in . He underwent excision in 2018. Final pathology showed a pT4b Jimbo's Level IV lesion. He was scheduled for an appointment with a surgical oncologist at [facility] for further evaluation including wide excision and sentinel lymph node biopsy. The patient did not attend several scheduled appointments at [facility]. Eventually, he was seen there in 2019, and received a recommendation for wide excision and sentinel lymph node biopsy. The patient did not follow-through with the recommended surgery, nor has he had any documented oncologic follow-up since 2019. Yesterday, the patient was brought to the emergency department by his wife after he began suffering seizures. At the time he was evaluated in the emergency department, he had experienced multiple seizures, initially affecting the right side, but then he coming generalized. Images were obtained of the brain including CT scan of the head without contrast.  This showed multiple mixed hyperdense/hemorrhagic lesions concerning for metastatic neoplasm. The largest was 3.2 cm with central hypoattenuation. Subsequent MRI scan showed numerous lesions measuring from subcentimeter up to 3.3 cm.   There was edema associated with the larger metastatic deposits and there was also activity: Not on file   Other Topics Concern    Not on file   Social History Narrative    Not on file     Exposure to Industrial/ environmental Carcinogens: no      ALLERGIES: No Known Allergies       No current facility-administered medications for this encounter. No current outpatient medications on file.      Facility-Administered Medications Ordered in Other Encounters   Medication Dose Route Frequency Provider Last Rate Last Dose    sodium chloride 3 % solution  25 mL/hr Intravenous Continuous KAREN Gordon CNP 15 mL/hr at 02/11/20 1059 15 mL/hr at 02/11/20 1059    levETIRAcetam (KEPPRA) 500 mg in sodium chloride 0.9 % 100 mL IVPB  500 mg Intravenous Q12H KAREN Gordon CNP        pantoprazole (PROTONIX) tablet 40 mg  40 mg Oral BID KAREN Gordon CNP        fentaNYL (SUBLIMAZE) injection 25 mcg  25 mcg Intravenous Q1H PRN KAREN Gordon - CNP   25 mcg at 02/11/20 0808    chlorhexidine (PERIDEX) 0.12 % solution 15 mL  15 mL Mouth/Throat BID KAREN Gordon CNP   15 mL at 02/10/20 2039    Dexamethasone Sodium Phosphate injection 4 mg  4 mg Intravenous TID KAREN Gordon CNP   4 mg at 40/42/80 4077    folic acid (FOLVITE) tablet 1 mg  1 mg Oral Daily KAREN Gordon CNP   1 mg at 02/11/20 1545    vitamin B-1 (THIAMINE) tablet 100 mg  100 mg Oral Daily KAREN Gordon CNP   100 mg at 02/11/20 1545    propofol injection  50 mcg/kg/min Intravenous Titrated Quinton Haynes MD 14 mL/hr at 02/11/20 1543 25 mcg/kg/min at 02/11/20 1543    influenza quadrivalent split vaccine (FLUZONE;FLUARIX;FLULAVAL;AFLURIA) injection 0.5 mL  0.5 mL Intramuscular Once Fitz Gilman MD        dexmedetomidine [facility]) 400 mcg in sodium chloride 0.9 % 100 mL infusion  0.2 mcg/kg/hr Intravenous Continuous KAREN Ordonez Ala - CNP 9.3 mL/hr at 02/11/20 0928 0.4 mcg/kg/hr at 02/11/20 0928    insulin lispro (HUMALOG) injection vial 0-6 Units  0-6 Units Subcutaneous Q4H KAREN Menard CNP   1 Units at 02/11/20 1249    glucose (GLUTOSE) 40 % oral gel 15 g  15 g Oral PRN KAREN Menard CNP        dextrose 50 % IV solution  12.5 g Intravenous PRN KAREN Menard CNP        glucagon (rDNA) injection 1 mg  1 mg Intramuscular PRN KAREN Menard CNP        dextrose 5 % solution  100 mL/hr Intravenous PRN KAREN Menard CNP         No outpatient medications have been marked as taking for the 2/11/20 encounter [facility] Encounter) with Vasu Mcnally MD.          LABORATORY STUDIES:   CBC:   Lab Results   Component Value Date    WBC 13.7 02/11/2020    RBC 4.76 02/11/2020    HGB 15.4 02/11/2020    HCT 46.4 02/11/2020    MCV 97.5 02/11/2020    MCH 32.4 02/11/2020    MCHC 33.2 02/11/2020     02/11/2020    MPV 10.2 02/11/2020     CMP:  Lab Results   Component Value Date     02/11/2020    K 4.9 02/11/2020     02/11/2020    CO2 19 02/11/2020    BUN 13 02/11/2020    CREATININE 0.8 02/11/2020    LABGLOM >90 02/11/2020    GLUCOSE 171 02/11/2020    PROT 7.0 02/11/2020    LABALBU 4.1 02/11/2020    CALCIUM 8.8 02/11/2020    BILITOT 0.2 02/11/2020    ALKPHOS 50 02/11/2020    AST 35 02/11/2020    ALT 83 02/11/2020         PATHOLOGY:   12/4/2018: Surgical pathology: [facility])  DIAGNOSIS:  Back, right upper:  Nodular melanoma, 5.2 mm in thickness, completely excised. (C43.59)    12/26/2018: Surgical pathology: [facility])  OUTSIDE SLIDES:  W  (12/04/2018)  Skin, right upper back:  Malignant melanoma, see synoptic report and comments. SYNOPTIC REPORT  Malignant melanoma  Type: Nodular type.   Yee (diameter of lesion): Not given  Level of the patient: Jimbo's level IV  Depth of invasion: Approximately 5.2 mm (measured grossly)  Radial growth phase: Not identified  Vertical gross disease: Present  Mitotic count: 4/mm²  Regression: Not identified  Precursor lesion: The possibility this lesion has arisen in a pre-existing melanocytic nevus cannot be excluded. Blood vessel, lymphatic and neural invasion: Not identified  Tumor infiltrating lymphocytes: Present focally; non-brisk  Satellite nodule: Not identified  Ulceration: Present  Margins: Malignant melanoma extends to within 5 mm from the closest lateral specimen border and to within approximately 15 mm from the closest deep margin. These findings correspond to an AJCC pT4b lesion. RADIOLOGIC STUDIES:   2/10/2020: CT head without contrast:  Impression   Multiple mixed hyperdense/hemorrhagic lesions in the left temporoparietal lobes concerning for neoplasm. Largest in the temporal lobe is 3.1 x 2.3 with central hypoattenuation. Largest in the high parietal lobe is 3.2 x 2 cm and is uniformly hyperdense. Infection is thought to be less likely. Further evaluation with contrast-enhanced brain MRI is advised. No midline shift at this time however there is a fair amount of associated edema surrounding individual lesions. 2/10/2020: MRI brain with/without contrast:  Impression   1. Numerous enhancing masses in the brain. This is most compatible with metastatic disease. Some of these have associated internal hemorrhage. These range in size from punctate to 3.3 cm.   2. There is edema associated with the larger metastases. 3. Possible aneurysm of the right internal carotid artery. This could also represent tortuosity. A CTA or MRA of the head are recommended. 4. Opacified right maxillary sinus. There may be an associated obstructing right nasal polyp. 2/11/2020: CT chest with contrast:  Impression   1. Multiple pulmonary nodules in the right lung as evidence for metastatic disease. 2. Small bilateral pleural effusions with mild associated atelectasis. 3. No evidence of metastatic disease in the abdomen and pelvis.

## 2020-02-11 NOTE — CONSULTS
Substance and Sexual Activity   Alcohol Use Yes    Alcohol/week: 36.0 standard drinks    Types: 36 Cans of beer per week    Comment: pt states 12 pack a week     Social History     Substance and Sexual Activity   Drug Use No         Family History:   History reviewed. No pertinent family history. Review of Systems:  All systems reviewed and are all negative except what is mentioned in history of present illness. Physical Exam:  /85   Pulse 61   Temp 98.4 °F (36.9 °C) (Bladder)   Resp 15   Ht 5' 10\" (1.778 m)   Wt 204 lb 2.3 oz (92.6 kg)   SpO2 95%   BMI 29.29 kg/m²  I Body mass index is 29.29 kg/m². I   Wt Readings from Last 1 Encounters:   02/11/20 204 lb 2.3 oz (92.6 kg)           General: Patient is intubated and is on ventilator and on sedation. HEENT: No pallor or icterus. Neck supple. No Carotid bruits. Heart: S1 and S2 normal with regular rhythm. No murmur. GENERAL NEUROLOGIC EXAM     Mental Status: Intubated, on sedation . Hence exam is limited. Cranial nerves:  Fundi were normal bilaterally. No gaze deviation, pupils equal reacting to light . Brainstem reflexes intact. Rest of the cranial nerve cannot be examined as patient does not participate. Motor: Does not withdraw to pain bilaterally  Sensation: Does not withdraw to pain bilaterally  Reflexes: 1+ bilaterally symmetrical with down going toes. Coordination and gait cannot be tested as patient does not participate.         Labs:    CBC:   Recent Labs     02/10/20  1353 02/11/20  0840   WBC 16.9* 13.7*   HGB 17.1 15.4    198   .0* 97.5*   MCH 33.7* 32.4   MCHC 32.8 33.2     CMP:  Recent Labs     02/10/20  1353 02/10/20  2237 02/11/20  0352 02/11/20  0840 02/11/20  1255 02/11/20  1530    136 138 144 139 139   K 4.4 4.5 4.9  --   --   --    CL 97* 106 108  --   --   --    CO2 12* 20* 19*  --   --   --    BUN 12 13 13  --   --   --    CREATININE 1.0 0.8 0.8  --   --   --    LABGLOM 80* >90 >90  --   --   --    GLUCOSE 186* 129* 171*  --   --   --    CALCIUM 9.4 8.5 8.8  --   --   --      Liver:   Recent Labs     02/11/20  0352   AST 35   ALT 83*   ALKPHOS 50   PROT 7.0   LABALBU 4.1   BILITOT 0.2*     Stroke Labs: No results for input(s): [id number], TSH, LDLCALC, LABA1C in the last 72 hours. Imaging:  No results found for this or any previous visit. No results found for this or any previous visit. No results found for this or any previous visit. No results found for this or any previous visit. No results found for this or any previous visit. Results for orders placed during the hospital encounter of 02/10/20   MRI Brain W WO Contrast    Narrative PROCEDURE: MRI BRAIN W [address] INFORMATIONEvaluate hemorrhagic hyperdense masses seen on CT scan; seizures. COMPARISON: Head CT 2/10/2020. Head CT 1/15/2013. TECHNIQUE: Multiplanar and multiple spin echo T1 and T2-weighted images were obtained through the brain before and after the administration of intravenous contrast.    FINDINGS:    There are 14 enhancing masses in the brain. These are all intra-axial. Some of these are punctate. The 2 largest are in the left temporal lobe and paramedian left parietal lobe. The 3.0 x 2.4 cm left temporal lobe lesion has rim enhancement and central necrosis. There is some associated susceptibility artifact along its rim suggesting some blood products. The left parietal lobe lesion is oval in shape and measures 3.3 x 2.3 cm. This has susceptibility artifact within it and high central signal on the T1-weighted images. There is internal hemorrhage within this lesion. There are 2 smaller hemorrhagic lesions. These are in the superior left parietal lobe and the posterior left temporal lobe. There is a punctate hemorrhagic lesion in the periphery of the right cerebellum. On the postcontrast images, there are other scattered punctate foci of enhancement.  Those other foci do not
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
## 2020-02-11 NOTE — CONSULTS
2/12/19. He recommended that follow up scans and reached out to patient in July 2019 to urge him to follow up. Per the patient's wife, he never followed up or got any scans. Biopsy report from [facility]    Skin, right upper back:  -          Malignant melanoma, see Synoptic Report and Comment    SYNOPTIC REPORT  Malignant melanoma  Type: Nodular type  Breadth (diameter of lesion): Not given    Level of invasion: Jimbo's level IV  Depth of invasion: Approximately 5.2 mm (measured grossly)  Radial growth phase: Not identified    Vertical growth phase: Present  Mitotic count: 4/mm2  Regression: Not identified        Precursor lesion: The possibility this lesion has arisen in a  pre-existing melanocytic nevus cannot be excluded  Blood vessel, lymphatic and neural invasion: Not identified    Tumor infiltrating lymphocytes: Present focally; non-brisk  Satellite nodule:  Not identified   Ulceration: Present       Margins: Malignant melanoma extends to within 5 mm from the closest  lateral specimen border and to within approximately 15 mm (1.5 cm) from the  closest deep margin   These findings correspond to an AJCC pT4b stage lesion.      Meds    Current Medications    chlorhexidine  15 mL Mouth/Throat BID    famotidine  20 mg Per NG tube BID    dexamethasone  4 mg Intravenous TID    folic acid  1 mg Oral Daily    thiamine  100 mg Oral Daily    influenza virus vaccine  0.5 mL Intramuscular Once    insulin lispro  0-6 Units Subcutaneous Q4H     fentanNYL, glucose, dextrose, glucagon (rDNA), dextrose  IV Drips/Infusions   sodium chloride      propofol 45 mcg/kg/min (02/11/20 0758)    dexmedetomidine 0.4 mcg/kg/hr (02/11/20 0928)    dextrose       Past Medical History         Diagnosis Date    Seizures ([address]     TBI (traumatic brain injury) [address] [address] 2009      Past Surgical History           Procedure Laterality Date    CRANIOTOMY       Diet    No diet orders on file  Allergies    Patient has no known surrounding individual lesions. . Complete opacification of the right maxillary sinus and partially opacified right ethmoid sinus. Mastoid air cells are patent. Skull: Unremarkable. Soft tissues: Unremarkable. Multiple mixed hyperdense/hemorrhagic lesions in the left temporoparietal lobes concerning for neoplasm. Largest in the temporal lobe is 3.1 x 2.3 with central hypoattenuation. Largest in the high parietal lobe is 3.2 x 2 cm and is uniformly hyperdense. Infection is thought to be less likely. Further evaluation with contrast-enhanced brain MRI is advised. No midline shift at this time however there is a fair amount of associated edema surrounding individual lesions. These results were communicated directly with Sunil Corrigan, [facility] on 2/10/2020 2:46 PM,  [ ] **This report has been created using voice recognition software. It may contain minor errors which are inherent in voice recognition technology. ** Final report electronically signed by Dr. Yolis Joshi on 2/10/2020 2:48 PM    Xr Chest Portable    Result Date: 2/11/2020  PROCEDURE: XR CHEST PORTABLE CLINICAL INFORMATION: cvc placement COMPARISON: 2/10/2020 TECHNIQUE:  AP mobile chest single view  FINDINGS: There is a right subclavian central venous catheter present with the tip overlying the cavoatrial junction. There is an endotracheal tube present with the tube coursing below the level of the diaphragm, the distal portions of which are excluded from the current examination. There is an endotracheal tube present with the tip overlying the mid trachea 4.6 cm above the ayala. The heart size is normal. No focal consolidation, pleural effusion or pneumothorax is seen. No acute osseous findings are demonstrated. 1. Interval placement of right subclavian central venous catheter with tip overlying the cavoatrial junction. No other acute cardiopulmonary findings are otherwise seen.  There is slightly improved aeration of the lung bases when compared to prior This has susceptibility artifact within it and high central signal on the T1-weighted images. There is internal hemorrhage within this lesion. There are 2 smaller hemorrhagic lesions. These are in the superior left parietal lobe and the posterior left temporal lobe. There is a punctate hemorrhagic lesion in the periphery of the right cerebellum. On the postcontrast images, there are other scattered punctate foci of enhancement. Those other foci do not have associated hemorrhage. These mass lesions do not restrict diffusion. The brain volume is normal. There is no midline shift. There is no hydrocephalus. There is mass effect upon the atria of the left lateral ventricle. On the FLAIR and T2-weighted sequences, there is vasogenic edema surrounding the larger metastases. This is in the left parietal lobe and left temporal lobe. There are few smaller areas of edema. There is volume loss in the inferior frontal lobes bilaterally and the anterior right temporal lobe consistent with prior traumatic brain injury. That has been present on prior head CTs. On the gradient echo T2-weighted images, there is evidence of old subarachnoid hemorrhage in the right frontal lobe. This is along a prior ventriculostomy tract site. There is a prominent flow void near the expected location of the right posterior communicating artery. This could represent a tortuous internal carotid artery. An aneurysm is also a consideration. The midline craniocervical junction structures are normal.  The brainstem and pituitary gland are normal. The right maxillary sinus is completely opacified. This has heterogeneous signal. There is a possible mass in the nasal cavity on the right which extends into the maxillary sinus. This could represent a polyp. 1. Numerous enhancing masses in the brain. This is most compatible with metastatic disease. Some of these have associated internal hemorrhage.  These range in size from punctate to 3.3 cm. 2. There is

## 2020-02-11 NOTE — FLOWSHEET NOTE
[address] THERAPY MISSED TREATMENT NOTE  [facility] ICU 4D  [id number]      Date: 2020  Patient Name: Etelvina Penn        CSN: [id number]   : 1975  (53 y.o.)  Gender: male                REASON FOR MISSED TREATMENT: Hold Treatment per Nursing Pt currently sedated on the vent, not appropriate for early mobility at this time. Will check back tomorrow 2020.

## 2020-02-11 NOTE — PLAN OF CARE
Problem: Falls - Risk of:  Goal: Will remain free from falls  Description  Will remain free from falls  Outcome: Ongoing  Note:   Patient intubated and sedated. Bed alarmed armed. Bed wheels locked. Bedside table in reach. Hourly rounding being completed. Side rails upx 4. Problem: Falls - Risk of:  Goal: Absence of physical injury  Description  Absence of physical injury  Outcome: Ongoing  Note:   Patient free of accidental injury. Patient intubated and sedated. Bed alarmed armed. Bed wheels locked. Bedside table in reach. Patient up with assistance when ambulating. Patient verbalizes and demonstrates the use of the call light. Hourly rounding being completed. Problem: Risk for Impaired Skin Integrity  Goal: Tissue integrity - skin and mucous membranes  Description  Structural intactness and normal physiological function of skin and  mucous membranes. Outcome: Ongoing  Note:   Patient on a turn q2 hours and PRN schedule. Patient arms and legs are elevated on pillows. No redness noted behind ears, nose, heels, coccyx, shoulder blades, and elbows. Sacral protective dressing in place. Protective barrier applied to coccyx. Bernabe in place to prevent incontinence. Skin folds intact and no redness noted. Problem: Discharge Planning:  Goal: Participates in care planning  Description  Participates in care planning  Outcome: Ongoing  Note:   Patient plans to return home with wife at discharge and no needs voiced at this time. Patient working with social work for discharge planning. Problem: Discharge Planning:  Goal: Discharged to appropriate level of care  Description  Discharged to appropriate level of care  Outcome: Ongoing  Note:   Patient plans to return home with wife at discharge and no needs voiced at this time. Patient working with social work for discharge planning.         Problem: Airway Clearance - Ineffective:  Goal: Ability to maintain a clear airway will improve  Description  Ability to maintain a clear airway will improve  Outcome: Ongoing  Note:   Patient is on mechanical ventilator at this time. Patients oxygen saturation maintains above 92% at this time. Pt has displayed no signs or symptoms of hypoxia throughout shift. Will continue to monitor. Problem: Anxiety/Stress:  Goal: Level of anxiety will decrease  Description  Level of anxiety will decrease  Outcome: Ongoing  Note:   Patient level of anxiety and stress maintained throughout shift. Calm environment inforced. PRN medications given as needed. Problem: Aspiration:  Goal: Absence of aspiration  Description  Absence of aspiration  Outcome: Ongoing  Note:   Patient absent of aspiration this shift. Oral care preformed q2 hours and PRN. Patient suctioned as needed. Problem: Bowel Function - Altered:  Goal: Bowel elimination is within specified parameters  Description  Bowel elimination is within specified parameters  Outcome: Ongoing  Note:   Patient continent of stool this shift. Bowel sounds active x4 quads. Patient receiving PRN medication to help improve bowel function. No GI complications noted. Problem: Cardiac Output - Decreased:  Goal: Hemodynamic stability will improve  Description  Hemodynamic stability will improve  Outcome: Ongoing  Note:   Blood pressure within normal limits this shift. Patient displays no bleeding abnormalities. Monitoring labs frequently for any abnormalities. Capillary refill less than 3 seconds. Skin turgor less than 3 seconds. Pulses palpable. Problem: Fluid Volume - Imbalance:  Goal: Absence of imbalanced fluid volume signs and symptoms  Description  Absence of imbalanced fluid volume signs and symptoms  Outcome: Ongoing  Note:   Accurate intake and output being monitored with patient's condition.        Problem: Gas Exchange - Impaired:  Goal: Levels of oxygenation will improve  Description  Levels of oxygenation will improve  Outcome: Ongoing  Note:   Patient is on mechanical ventilator at this time. Patients oxygen saturation maintains above 92% at this time. Pt has displayed no signs or symptoms of hypoxia throughout shift. Will continue to monitor. Problem: Mental Status - Impaired:  Goal: Mental status will be restored to baseline  Description  Mental status will be restored to baseline  Outcome: Ongoing     Problem: Nutrition Deficit:  Goal: Ability to achieve adequate nutritional intake will improve  Description  Ability to achieve adequate nutritional intake will improve  Outcome: Ongoing     Problem: Pain:  Goal: Pain level will decrease  Description  Pain level will decrease  Outcome: Ongoing  Note:   CPOT pain scale assessed q4 hours and PRN while patient is on the ventilator. Current pain level is 0/10. Pain goal is 0/10. Patient is receiving fentanyl for pain as needed at this time. Problem: Serum Glucose Level - Abnormal:  Goal: Ability to maintain appropriate glucose levels will improve to within specified parameters  Description  Ability to maintain appropriate glucose levels will improve to within specified parameters  Outcome: Ongoing  Note:   Patient on sliding scale insulin q4 hours.       Problem: Skin Integrity - Impaired:  Goal: Will show no infection signs and symptoms  Description  Will show no infection signs and symptoms  Outcome: Ongoing     Problem: Skin Integrity - Impaired:  Goal: Absence of new skin breakdown  Description  Absence of new skin breakdown  Outcome: Ongoing     Problem: Sleep Pattern Disturbance:  Goal: Appears well-rested  Description  Appears well-rested  Outcome: Ongoing     Problem: Tissue Perfusion, Altered:  Goal: Circulatory function within specified parameters  Description  Circulatory function within specified parameters  Outcome: Ongoing     Problem: Tissue Perfusion - Cardiopulmonary, Altered:  Goal: Absence of angina  Description  Absence of angina  Outcome: Ongoing     Problem: Tissue Perfusion - Cardiopulmonary, Altered:  Goal: Hemodynamic stability will improve  Description  Hemodynamic stability will improve  Outcome: Ongoing     Problem: Restraint Use - Nonviolent/Non-Self-Destructive Behavior:  Goal: Absence of restraint indications  Description  Absence of restraint indications  Outcome: Ongoing  Note:   Restraints remain in place. Patient continues to pull at lines and equipment. Patient unable to follow commands. No signs of restraint injury at this time. Restraints released q2 hours and PRN. Problem: Restraint Use - Nonviolent/Non-Self-Destructive Behavior:  Goal: Absence of restraint-related injury  Description  Absence of restraint-related injury  Outcome: Ongoing  Note:   Patient has no sign of injury at this time. Restraints removed q2 hours and skin assessed. ROM completed q2 hours. Care plan reviewed with patients family. Patients family verbalize understanding of the plan of care and contribute to goal setting.

## 2020-02-11 NOTE — PROGRESS NOTES
pain. Per family they were told pt also has a couple of spots on his lungs. Pt seen intubated, sedated on precedex & Diprivan ( 665 kcals in lipid emulsion at current rate). WBC 13.7, glucose 139, meds also include steroid, insulin, thiamine & folic acid  · Wound Type: (per RN pt does not have wounds, but is  & has scrapes)  · Current Nutrition Therapies:  · Oral Diet Orders: NPO   · Oral Diet intake: NPO  · Oral Nutrition Supplement (ONS) Orders: None  · ONS intake: NPO  · Tube Feeding (TF) Orders:   · Feeding Route: Orogastric  · Formula: Semi-elemental(Vital 1.2 )  · Rate (ml/hr):start at 20 ml/hr & goal is 45 ml/hr    · Volume (ml/day): 1080 ml  · Duration: Continuous  · Additives/Modulars: (1 ( 2.5 oz) liquid protein daily)  · Water Flushes: per Dr  · Goal TF & Flush Orders Provides: 1440 kcals TF ( 2065 kcals with diprivan), 107 grams portein & 119 grams CHO/24 hours  · Anthropometric Measures:  · Ht: 5' 10\" (177.8 cm)   · Current Body Wt: 204 lb 2.3 oz (92.6 kg)(2/11 no edema)  · Admission Body Wt: 205 lb 14.6 oz (93.4 kg)(2/10 no edema)  · Usual Body Wt: (Current not available. stated in 2013  185#)  · Ideal Body Wt: 166 lb (75.3 kg),  ·  BMI Classification: BMI 25.0 - 29.9 Overweight(29.4)    Nutrition Interventions:   Start Tube Feeding  Continued Inpatient Monitoring, Education not appropriate at this time, Coordination of Care    Nutrition Evaluation:   · Evaluation: Progressing toward goals   · Goals: TF to provide % of nutrient needs while pt is intubated.    · Monitoring: TF Intake, Nutrition Progression, Skin Integrity, Wound Healing, Pertinent Labs, Chewing/Swallowing, Monitor Bowel Function      Electronically signed by Vanessa Glez RD, LD on 2/11/20 at 3:17 PM    Contact Number: [phone]

## 2020-02-11 NOTE — PLAN OF CARE
Nutrition Problem: Inadequate oral intake  Intervention: Food and/or Nutrient Delivery: Start Tube Feeding  Nutritional Goals: TF to provide % of nutrient needs while pt is intubated.

## 2020-02-12 NOTE — PROGRESS NOTES
Radiation Oncology Brief Note: 02/12/2020    Tavares Neves has now been extubated and is not actively seizing, but still with confusion. Assuming he remains medically stable, we indigo simulation and start of radiation therapy if he and his wife are agreeable. Discussed with Dr. Brittany Gaming today, and will want to coordinate with him before any radiation therapy is started.     Nigel Rodriguez, PhD, MD  Radiation Oncology

## 2020-02-12 NOTE — FLOWSHEET NOTE
[address]  PHYSICAL THERAPY MISSED TREATMENT NOTE  ACUTE CARE    Date: 2020  Patient Name: Henok Ch        MRN: [id number]   : 1975  (53 y.o.)  Gender: male                REASON FOR MISSED TREATMENT:  Hold treatment per nursing request.  Nurse reports Pt gets too agitated when sedation is decreased, not an early mobility candidate at this time. Will check back at a later date.

## 2020-02-12 NOTE — PROGRESS NOTES
[facility]    Patient - Fredrick Perera   MRN -  [id number]   [facility] # - [de-identified]   - 1975      Date of Admission -  2/10/2020  1:06 PM  Date of evaluation -  2020  Room - 4D--A   Hospital Day - 76 Umu Ortez MD Primary Care Physician - No primary care provider on file. Reason for Consult    Possible brain metastasis, malignant melanoma    Active Hospital Problem List      Active Hospital Problems    Diagnosis Date Noted    Acute respiratory failure ([facility] [J96.00]     Seizures ([facility] [R56.9] 02/10/2020     HPI   Fredrick Perera is a 53 y.o. male with PMH alcohol abuse, per family drank 3-4 cases of beer a week, DM, TBI s/p craniotomy and melanoma excision of L upper back In 2012, he had a melanoma removed from his left back and was instructed to follow up but he did not. He  was admitted for seizures. He was intubated due to inability to protect airway. CT Head and MRI Brain showed numerous enhancing masses in the brain. This is most compatible with metastatic disease from his melanoma. Some of these have associated internal hemorrhage. These range in size from punctate to 3.3 cm with the largest in the left parietal lobe with surrounding edema. CT Chest showed multiple pulmonary nodules in the right lung as evidence for metastatic disease. No metastasis was noted on CTAP. ROS not done due to patient being intubated. Spoke with his wife who state that  he had several seizures at home prior to coming into the hospital but has never had seizures before and has not been acting any differently over the last few months. She confirmed that he had not followed up after his melanoma biopsy. We discussed the MRI results and plan for chest and abdomen scans and that Radiation Oncology was being consulted to discuss radiation therapy for his brain metastasis. Interval History  Has been extubated and is breathing on own.   Still very confused. Reviewed CT scans with wife who was at bedside. Answered all their questions. Oncology History    Was seen by Dr. Carl Fernando at [facility] who did biopsy 2/12/19. He recommended that follow up scans and reached out to patient in July 2019 to urge him to follow up. Per the patient's wife, he never followed up or got any scans. Biopsy report from [facility]    Skin, right upper back:  -          Malignant melanoma, see Synoptic Report and Comment    SYNOPTIC REPORT  Malignant melanoma  Type: Nodular type  Breadth (diameter of lesion): Not given    Level of invasion: Jimbo's level IV  Depth of invasion: Approximately 5.2 mm (measured grossly)  Radial growth phase: Not identified    Vertical growth phase: Present  Mitotic count: 4/mm2  Regression: Not identified        Precursor lesion: The possibility this lesion has arisen in a  pre-existing melanocytic nevus cannot be excluded  Blood vessel, lymphatic and neural invasion: Not identified    Tumor infiltrating lymphocytes: Present focally; non-brisk  Satellite nodule:  Not identified   Ulceration: Present       Margins: Malignant melanoma extends to within 5 mm from the closest  lateral specimen border and to within approximately 15 mm (1.5 cm) from the  closest deep margin   These findings correspond to an AJCC pT4b stage lesion.      Meds    Current Medications    pantoprazole  40 mg Intravenous BID    levetiracetam  1,000 mg Intravenous Q12H    chlorhexidine  15 mL Mouth/Throat BID    dexamethasone  4 mg Intravenous TID    folic acid  1 mg Oral Daily    thiamine  100 mg Oral Daily    influenza virus vaccine  0.5 mL Intramuscular Once    insulin lispro  0-6 Units Subcutaneous Q4H     fentanNYL, glucose, dextrose, glucagon (rDNA), dextrose  IV Drips/Infusions   propofol 35 mcg/kg/min (02/12/20 1530)    dexmedetomidine 0.2 mcg/kg/hr (02/12/20 1615)    dextrose       Past Medical History         Diagnosis Date    Seizures ([facility]     [facility] ([facility] 2009      Past Surgical History           Procedure Laterality Date    CRANIOTOMY       Diet    DIET TUBE FEED CONTINUOUS/CYCLIC NPO; Semi-elemental; Orogastric; Continuous; 20; 43  Allergies    Patient has no known allergies. Social History     Social History     Socioeconomic History    Marital status:      Spouse name: Not on file    Number of children: Not on file    Years of education: Not on file    Highest education level: Not on file   Occupational History    Not on file   Social Needs    Financial resource strain: Not on file    Food insecurity:     Worry: Not on file     Inability: Not on file    Transportation needs:     Medical: Not on file     Non-medical: Not on file   Tobacco Use    Smoking status: Current Every Day Smoker     Packs/day: 1.50     Types: Cigarettes    Smokeless tobacco: Never Used   Substance and Sexual Activity    Alcohol use: Yes     Alcohol/week: 36.0 standard drinks     Types: 36 Cans of beer per week     Comment: pt states 12 pack a week    Drug use: No    Sexual activity: Yes     Partners: Female   Lifestyle    Physical activity:     Days per week: Not on file     Minutes per session: Not on file    Stress: Not on file   Relationships    Social connections:     Talks on phone: Not on file     Gets together: Not on file     Attends Voodoo service: Not on file     Active member of club or organization: Not on file     Attends meetings of clubs or organizations: Not on file     Relationship status: Not on file    Intimate partner violence:     Fear of current or ex partner: Not on file     Emotionally abused: Not on file     Physically abused: Not on file     Forced sexual activity: Not on file   Other Topics Concern    Not on file   Social History Narrative    Not on file     Family History    History reviewed. No pertinent family history.   ROS     Review of Systems   Limited ROS as patient is sedated and intubated  Vitals INR 1.03     PTT  No results for input(s): APTT in the last 72 hours. Radiology        Ct Head Wo Contrast    Result Date: 2/10/2020  PROCEDURE: CT HEAD WO CONTRAST CLINICAL INFORMATION: seizures; AMS. COMPARISON: February 15, 2013 TECHNIQUE: Noncontrast 5 mm axial images were obtained through the brain. All CT scans at this facility use dose modulation, iterative reconstruction, and/or weight-based dosing when appropriate to reduce radiation dose to as low as reasonably achievable. FINDINGS: Hypoattenuation/encephalomalacia right frontal lobes right greater than left. Ovoid hypoattenuating mass with well defined borders in the left temporal parietal lobe measuring 2.4 x 3.1 cm with surrounding edema. Second 2 x 3.1 cm hyperdense mass in the high left parietal lobe adjacent to the falx with surrounding edema. Second smaller hyperdense lesion measuring 1 x 1 cm just superior to the larger hemorrhagic mass. Left 1.6 x 1.3 cm hyperdense mass adjacent to the left parietal bone. Multiple mixed hyperdense/hemorrhagic left temporoparietal lobes concerning for neoplasm. Further evaluation with MRI is advised. No midline shift at this time however there is a fair amount of associated edema surrounding individual lesions. . Complete opacification of the right maxillary sinus and partially opacified right ethmoid sinus. Mastoid air cells are patent. Skull: Unremarkable. Soft tissues: Unremarkable. Multiple mixed hyperdense/hemorrhagic lesions in the left temporoparietal lobes concerning for neoplasm. Largest in the temporal lobe is 3.1 x 2.3 with central hypoattenuation. Largest in the high parietal lobe is 3.2 x 2 cm and is uniformly hyperdense. Infection is thought to be less likely. Further evaluation with contrast-enhanced brain MRI is advised. No midline shift at this time however there is a fair amount of associated edema surrounding individual lesions.  These results were communicated directly with Jess Johns, [facility] on 2/10/2020 2:46 PM,  [ ] **This report has been created using voice recognition software. It may contain minor errors which are inherent in voice recognition technology. ** Final report electronically signed by Dr. Namrata Perla on 2/10/2020 2:48 PM    Xr Chest Portable    Result Date: 2/11/2020  PROCEDURE: XR CHEST PORTABLE CLINICAL INFORMATION: cvc placement COMPARISON: 2/10/2020 TECHNIQUE:  AP mobile chest single view  FINDINGS: There is a right subclavian central venous catheter present with the tip overlying the cavoatrial junction. There is an endotracheal tube present with the tube coursing below the level of the diaphragm, the distal portions of which are excluded from the current examination. There is an endotracheal tube present with the tip overlying the mid trachea 4.6 cm above the ayala. The heart size is normal. No focal consolidation, pleural effusion or pneumothorax is seen. No acute osseous findings are demonstrated. 1. Interval placement of right subclavian central venous catheter with tip overlying the cavoatrial junction. No other acute cardiopulmonary findings are otherwise seen. There is slightly improved aeration of the lung bases when compared to prior examination dated 2/10/2020. **This report has been created using voice recognition software. It may contain minor errors which are inherent in voice recognition technology. ** Final report electronically signed by Dr. Tomy Liang on 2/11/2020 10:17 AM    Xr Chest Portable    Result Date: 2/10/2020  PROCEDURE: XR CHEST PORTABLE CLINICAL INFORMATION: tube placement. COMPARISON: 8/14/2009. TECHNIQUE: AP upright view of the chest. FINDINGS: There is an endotracheal tube in place with tip in the distal third of the trachea approximately 3 cm from the ayala. There is an NG tube in place with tip overlying the stomach. There are linear opacities at the bilateral lung bases. There is retrocardiac opacification.  The cardiac-based also what is surrounding the larger metastases. This is in the left parietal lobe and left temporal lobe. There are few smaller areas of edema. There is volume loss in the inferior frontal lobes bilaterally and the anterior right temporal lobe consistent with prior traumatic brain injury. That has been present on prior head CTs. On the gradient echo T2-weighted images, there is evidence of old subarachnoid hemorrhage in the right frontal lobe. This is along a prior ventriculostomy tract site. There is a prominent flow void near the expected location of the right posterior communicating artery. This could represent a tortuous internal carotid artery. An aneurysm is also a consideration. The midline craniocervical junction structures are normal.  The brainstem and pituitary gland are normal. The right maxillary sinus is completely opacified. This has heterogeneous signal. There is a possible mass in the nasal cavity on the right which extends into the maxillary sinus. This could represent a polyp. 1. Numerous enhancing masses in the brain. This is most compatible with metastatic disease. Some of these have associated internal hemorrhage. These range in size from punctate to 3.3 cm. 2. There is edema associated with the larger metastases. 3. Possible aneurysm of the right internal carotid artery. This could also represent tortuosity. A CTA or MRA of the head are recommended. 4. Opacified right maxillary sinus. There may be an associated obstructing right nasal polyp. **This report has been created using voice recognition software. It may contain minor errors which are inherent in voice recognition technology. ** Final report electronically signed by Dr. Treasure De Jesus on 2/11/2020 9:01 AM      Assessment/Recommendations    Lisbet Iqbal is a 53 y.o. male with PMH alcohol abuse, per family drank 3-4 cases of beer a week, DM, TBI s/p craniotomy and melanoma excision of L upper back In February 2012, he had a melanoma removed from his left back and was instructed to follow up but he did not. He  was admitted for seizures. He was intubated due to inability to protect airway. CT Head and MRI Brain showed numerous enhancing masses in the brain. This is most compatible with metastatic disease from his melanoma. Some of these have associated internal hemorrhage. These range in size from punctate to 3.3 cm with the largest in the left parietal lobe with surrounding edema. CT Chest showed multiple pulmonary nodules in the right lung as evidence for metastatic disease. No metastasis was noted on CTAP. 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 He was admitted for  seizures and MRI Brain showed numerous enhancing masses in the brain. This is most compatible with metastatic disease. Some of these have associated internal hemorrhage. These range in size from punctate to 3.3 cm with the largest in the left parietal lobe with surrounding edema. CT chest showed multiple pulmonary nodules in the R lung. CTAP was negative for metastasis. - Neurosurgery consulted - did not recommend surgery. No need to biopsy brain lesions.    - Radiation Oncology consulted- plan to start RT when patient is stable  - will follow up with Dr. 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 Questions and concerns addressed.   Plan made in collaboration with Dr. Robert Zuniga    Electronically signed by KAREN Smith - NP on 2/12/2020 at 4:17 PM

## 2020-02-12 NOTE — PLAN OF CARE
Problem: Falls - Risk of:  Goal: Will remain free from falls  Description  Will remain free from falls  Outcome: Met This Shift  Note:   NO fall this shift. Pt is calm and cooperative but has short term memory loss and inappropriate speech/ ? Aphasia at times. Frequent checks and hourly rounds to assess needs. Bed alarm on. Will evaluate need for tele sitter. Problem: Falls - Risk of:  Goal: Absence of physical injury  Description  Absence of physical injury  Outcome: Met This Shift     Problem: Airway Clearance - Ineffective:  Goal: Ability to maintain a clear airway will improve  Description  Ability to maintain a clear airway will improve  Outcome: Met This Shift  Note:   Pt able to be successfully extubated today. Strong productive cough, but swallows sputum. Nasal cannula if needed. PUlse ox low 90's on room air, sometimes drops to 80's and needs O2. Problem: Aspiration:  Goal: Absence of aspiration  Description  Absence of aspiration  Outcome: Met This Shift  Note:   Pt able to be weaned from vent and sats good on room air. Continue to monitor     Problem: Cardiac Output - Decreased:  Goal: Hemodynamic stability will improve  Description  Hemodynamic stability will improve  Outcome: Met This Shift  Note:   Vital signs stable. Continue to monitor     Problem: Gas Exchange - Impaired:  Goal: Levels of oxygenation will improve  Description  Levels of oxygenation will improve  Outcome: Met This Shift  Note:   Weaned off vent.  Sats good on room air     Problem: Tissue Perfusion, Altered:  Goal: Circulatory function within specified parameters  Description  Circulatory function within specified parameters  Outcome: Met This Shift     Problem: Tissue Perfusion - Cardiopulmonary, Altered:  Goal: Absence of angina  Description  Absence of angina  Outcome: Met This Shift     Problem: Tissue Perfusion - Cardiopulmonary, Altered:  Goal: Hemodynamic stability will improve  Description  Hemodynamic stability will improve  Outcome: Met This Shift     Problem: Restraint Use - Nonviolent/Non-Self-Destructive Behavior:  Goal: Absence of restraint indications  Description  Absence of restraint indications  Outcome: Met This Shift     Problem: Risk for Impaired Skin Integrity  Goal: Tissue integrity - skin and mucous membranes  Description  Structural intactness and normal physiological function of skin and  mucous membranes. Outcome: Ongoing  Note:   Turn and reposition every 2 hours and PRN. Keep heels and elbows elevated off bed. Watch for pressure from medical devices. Problem: Discharge Planning:  Goal: Participates in care planning  Description  Participates in care planning  Outcome: Ongoing  Note:   Not sure of treatment plan yet. May need surgery before radiation. Reassess discaharge needs daily  to assist with discharge needs     Problem: Discharge Planning:  Goal: Discharged to appropriate level of care  Description  Discharged to appropriate level of care  Outcome: Ongoing     Problem: Anxiety/Stress:  Goal: Level of anxiety will decrease  Description  Level of anxiety will decrease  Outcome: Ongoing  Note:   Precedex weaned off and pt calm and cooperative. Doesn't fully understand situation. Anxious at times. Offer reassurance and answer questions as able. Wife remains at bedside     Problem: Bowel Function - Altered:  Goal: Bowel elimination is within specified parameters  Description  Bowel elimination is within specified parameters  Outcome: Ongoing  Note:   NO BM this shift. Just started eating small amounts. Will continue to monitor     Problem: Fluid Volume - Imbalance:  Goal: Absence of imbalanced fluid volume signs and symptoms  Description  Absence of imbalanced fluid volume signs and symptoms  Outcome: Ongoing  Note:   Adequate I and O.  Continue to monitor     Problem: Mental Status - Impaired:  Goal: Mental status will be restored to baseline  Description  Mental status will be restored to the plan of care and contribute to goal setting.

## 2020-02-12 NOTE — FLOWSHEET NOTE
EEg tech in room preparing for EEG. Dr Mitchell Vanegas instructed to turn propofol and precedex off for EEG. Propofol decreased to 30 mcg/kg/min and precedex down to 0.3 mcg/kg/min while applying patches.

## 2020-02-12 NOTE — FLOWSHEET NOTE
02/12/20 0955   Encounter Summary   Services provided to: Patient and family together   Referral/Consult From: [address] [place] [address] Drive; Family members   Continue Visiting Yes  (2/12/2020 )   Complexity of Encounter Low   Length of Encounter 15 minutes   Routine   Type Follow up   Assessment Approachable   Intervention Prayer   S- During my contact with the 53 yr old patient and the family, I wanted to assess what their       spiritual and emotional needs were. The pt is coping with seizures. O-  The pt was in bed and the family was supportively present. A- The pt was receptive when I offered emotional support. P-  Continued support would be helpful in order to meet the future Spiritual needs of the         patient.

## 2020-02-12 NOTE — PROGRESS NOTES
Extubated pt at approximately 1030 by jacquelin garcias.   Placed pt on 6 lpm nc --  ventilator was set at 45%  Pt awake and talking, his family is in room

## 2020-02-12 NOTE — CARE COORDINATION
Oriented to self. Impulsive, pulling things. Follows commands. Intensivist, Oncology, Radiation Oncology, and Neurosurgery following. Dietitian consulted. PT/OT. Seizure precautions. Cardiac monitoring, I&O, bhatt care. Precedex @ 0.3 mcg/kg/hr, IV decadron 4 mg TID, prn IV fentanyl, folic acid, SSI Y8Q, IV keppra Q12H, protonix, thiamine. Wbc 18.6. Barriers to Discharge: n/a  PCP: No primary care provider on file. Readmission Risk Score: 9%  Patient Goals/Plan/Treatment Preferences: Home w/wife. Monitor PT/OT evals for possible needs.

## 2020-02-12 NOTE — PROGRESS NOTES
Interventional neurology    Patient Name: Jeet Pham  Patient : 1975  Room/Bed: [facility]008-A  Allergies: No Known Allergies    Patient is a 53 y.o. man who presented for generalized seizure. He has a known history of skin melanoma, but has not followed up with treatment for the past several years. He was initially intubated, but has since been extubated with a sleepy but nonfocal examination. Prior to this event he had no complaints. MRI revealed numerous enhancing lesions of his brain with surrounding edema, some with a hemorrhagic component. Given the clinical picture this is consistent with metastatic melanoma. He was noted to have a right pcomm aneurysm, approximately 8mm in size, for which interventional neurology was consulted. He denies thunderclap headache. He has no personal or family history of subarachnoid hemorrhage. He does smoke.      Allergies:  No Known Allergies    Medications:  Current Facility-Administered Medications: pantoprazole (PROTONIX) injection 40 mg, 40 mg, Intravenous, BID  levETIRAcetam (KEPPRA) 1,000 mg in sodium chloride 0.9 % 100 mL IVPB, 1,000 mg, Intravenous, Q12H  fentaNYL (SUBLIMAZE) injection 25 mcg, 25 mcg, Intravenous, Q1H PRN  chlorhexidine (PERIDEX) 0.12 % solution 15 mL, 15 mL, Mouth/Throat, BID  Dexamethasone Sodium Phosphate injection 4 mg, 4 mg, Intravenous, TID  folic acid (FOLVITE) tablet 1 mg, 1 mg, Oral, Daily  vitamin B-1 (THIAMINE) tablet 100 mg, 100 mg, Oral, Daily  propofol injection, 50 mcg/kg/min, Intravenous, Titrated  influenza quadrivalent split vaccine (FLUZONE;FLUARIX;FLULAVAL;AFLURIA) injection 0.5 mL, 0.5 mL, Intramuscular, Once  dexmedetomidine (PRECEDEX) 400 mcg in sodium chloride 0.9 % 100 mL infusion, 0.2 mcg/kg/hr, Intravenous, Continuous  insulin lispro (HUMALOG) injection vial 0-6 Units, 0-6 Units, Subcutaneous, Q4H  glucose (GLUTOSE) 40 % oral gel 15 g, 15 g, Oral, PRN  dextrose 50 % IV solution, 12.5 g, Intravenous, PRN  glucagon (rDNA) injection 1 mg, 1 mg, Intramuscular, PRN  dextrose 5 % solution, 100 mL/hr, Intravenous, PRN    Objective:  Patient Vitals for the past 8 hrs:   BP Temp Temp src Pulse Resp SpO2   02/12/20 1105 128/70 -- -- 64 13 94 %   02/12/20 0905 134/79 -- -- 53 12 97 %   02/12/20 0805 (!) 141/81 99.5 °F (37.5 °C) Bladder 52 13 97 %   02/12/20 0744 -- -- -- 52 15 96 %   02/12/20 0702 (!) 142/85 -- -- (!) 48 14 97 %   02/12/20 0621 -- -- -- 52 -- 97 %   02/12/20 0602 (!) 152/84 -- -- (!) 49 14 99 %           I/O last 3 completed shifts: In: 1279.5 [I.V.:1279.5]  Out: 9313 [Urine:1750; Emesis/NG output:850]    General Physical Examination:  General: In NAD, awake and alert. Resting comfortably. CV: S1+S2, RRR. Pulm: CTA b/l. Abdomen: Soft NT/ND. BS +. Skin: Intact without ulcers, breakdowns or discoloration. Extremities: Normal ROM. No cyanosis/clubbing/edema  Pulses: Intact  Neurological Examination  Higher Functions: Awake, alert, oriented x 3. Sleepy, but normal mood and affect. Normal thought process. Fluent speech, with intact comprehension, and repetition. No dysarthria or dyscalculia. Immediate recent, and remote memory intact. Cranial Nerves: Visual fields are full. Pupils equal (~ 4mm OD, 4mm OS) and reactive OU. Extraocular motility is intact without nystagmus. Face is symmetric. Normal  facial sensation. Hearing acuity normal to finger rub. No palatal asymmetry. Equal shoulder shrug and strength of bilateral trapezii. Tongue is midline without fasciculation, tremors or atrophy  Motor Examination: Normal tone, bulk and strength throughout. No adventitious movements noted. Muscle Stretch Reflexes: Symmetric and normoactive bilaterally. No pathological reflexes identified. Plantar responce was flexor bilaterally. Sensory: Intact to light-touch and pain throughout. Coordination: Intact finger-nose-finger and heel knee shin test. Normal rapid alternating movements.   Gait: did not test.     Ancillary Data:  Recent Labs     02/10/20  1353 02/11/20  0840 02/12/20  0405   WBC 16.9* 13.7* 18.6*   RBC 5.07 4.76 4.64*   HGB 17.1 15.4 15.1   HCT 52.2* 46.4 46.1   .0* 97.5* 99.4*   MCH 33.7* 32.4 32.5    198 192     Recent Labs     02/10/20  2237 02/11/20  0352  02/12/20  0218 02/12/20  0404 02/12/20  0603    138   < > 141 143 142   K 4.5 4.9  --   --  4.8  --     108  --   --  108  --    CO2 20* 19*  --   --  24  --    BUN 13 13  --   --  18  --    CREATININE 0.8 0.8  --   --  0.9  --    GLUCOSE 129* 171*  --   --  148*  --    CALCIUM 8.5 8.8  --   --  9.1  --     < > = values in this interval not displayed. Coagulation:  Recent Labs     02/10/20  1353   INR 1.03       ASSESSMENT & PLAN/RECOMMENDATIONS:    53 y.o. man with likely metastatic skin melanoma to brain and seizure. Incidental unruptured right pcomm aneurysm measuring 8 mm in greatest dimension.    - Discussed aneurysmal risk of rupture based on size and location with patient and family. Discussed in detail diagnostic and treatment options (endovascular and surgical) and weighed lifetime risk of rupture. Unfortunately, in the setting of metastatic brain cancer his risk of complications or even death from that issue is likely higher than risk of aneurysmal rupture. The family and patient are in understanding and agreement. He was advised to control BP and stop smoking. Stroke risk and warning signs discussed in detail.  - Please re-consult if brain lesions are found to be a treatable condition or after acute treatments are initiated and life expectancy is considered to be > 5 years. Risk of rupture in interim discussed in detail. Please call with questions.     MD Leonor Reynoso MD  Electronically signed by Ruth Araujo MD on 2/12/2020 at 1:29 PM

## 2020-02-12 NOTE — PLAN OF CARE
improve  Outcome: Ongoing  Note:   Patient is on mechanical ventilator at this time. Patients oxygen saturation maintains above 92% at this time. Pt has displayed no signs or symptoms of hypoxia throughout shift. Will continue to monitor.        Problem: Mental Status - Impaired:  Goal: Mental status will be restored to baseline  Description  Mental status will be restored to baseline  Outcome: Ongoing     Problem: Nutrition Deficit:  Goal: Ability to achieve adequate nutritional intake will improve  Description  Ability to achieve adequate nutritional intake will improve  Outcome: Ongoing     Problem: Pain:  Goal: Pain level will decrease  Description  Pain level will decrease  Outcome: Ongoing  Note:   CPOT pain scale assessed q4 hours and PRN while patient is on the ventilator. Current pain level is 0/10. Pain goal is 0/10.  Patient is receiving fentanyl for pain as needed at this time.        Problem: Serum Glucose Level - Abnormal:  Goal: Ability to maintain appropriate glucose levels will improve to within specified parameters  Description  Ability to maintain appropriate glucose levels will improve to within specified parameters  Outcome: Ongoing  Note:   Patient on sliding scale insulin q4 hours.      Problem: Skin Integrity - Impaired:  Goal: Will show no infection signs and symptoms  Description  Will show no infection signs and symptoms  Outcome: Ongoing     Problem: Skin Integrity - Impaired:  Goal: Absence of new skin breakdown  Description  Absence of new skin breakdown  Outcome: Ongoing     Problem: Sleep Pattern Disturbance:  Goal: Appears well-rested  Description  Appears well-rested  Outcome: Ongoing     Problem: Tissue Perfusion, Altered:  Goal: Circulatory function within specified parameters  Description  Circulatory function within specified parameters  Outcome: Ongoing     Problem: Tissue Perfusion - Cardiopulmonary, Altered:  Goal: Absence of angina  Description  Absence of angina  Outcome: Ongoing     Problem: Tissue Perfusion - Cardiopulmonary, Altered:  Goal: Hemodynamic stability will improve  Description  Hemodynamic stability will improve  Outcome: Ongoing     Problem: Restraint Use - Nonviolent/Non-Self-Destructive Behavior:  Goal: Absence of restraint indications  Description  Absence of restraint indications  Outcome: Ongoing  Note:   Restraints remain in place. Patient continues to pull at lines and equipment. Patient unable to follow commands. No signs of restraint injury at this time. Restraints released q2 hours and PRN.       Problem: Restraint Use - Nonviolent/Non-Self-Destructive Behavior:  Goal: Absence of restraint-related injury  Description  Absence of restraint-related injury  Outcome: Ongoing  Note:   Patient has no sign of injury at this time. Restraints removed q2 hours and skin assessed.  ROM completed q2 hours.      Care plan reviewed with patients family.  Patients family verbalize understanding of the plan of care and contribute to goal setting.

## 2020-02-12 NOTE — PROGRESS NOTES
[address] on 2/10/2020 with reports of seizures at home.  The past medical history of traumatic brain brain injury in 2009, current smoker, current EtOH abuse.  Per report patient was at home outside walking around when he came back inside wife states he fell to the ground and had a seizure.  Wife reports that he has had 8 seizures since this morning.  Wife denies any injuries occurring but he did hit the floor during the seizure.  Wife states that after the seizure he is confused and unable to recognize family. Dana Luna also reports that he is an everyday drinker and quit 2 days prior due to his  requiring him to stop drinking. Karyna Hull denies any prior history of seizures, but does admit to a TBI in 2009 when he fell out of a moving truck. Karyna Hull also admits about a year ago he had a mass removed from his back at the [facility] in [facility] and did not follow-up. [facility] everywhere shows that a biopsy of a nodular lesion was taken at [facility] on 12/4/2018 pathology reports malignant melanoma.  Updated family on possibility for metastasis, Dr. Breanne Chan see tomorrow. Jessie Divine continued on Keppra and Decadron every 8 hours. 2/11 oncology has seen, recommendations for chemo and radiation. Radiation oncology Dr. Johanna Gonzalez plans to see patient. No plans for surgical intervention at this time. Unclear if seizures are from metastasis to brain or EtOH withdrawal.  Patient continues on Precedex drip. 2/12 CTA head and neck pending. CPAP trial at this time, no plans for surgical intervention. Past Medical History:  Per HPI  Family History:  No known family history  Social History:  Current 1.5 pack/day smoker x30 years. Current alcohol abuse, denies substance abuse. ROS (12 point review of systems completed. Pertinent positives noted.  Otherwise ROS is negative)    Intubated and sedated on mechanical ventilation    Scheduled Meds:   pantoprazole  40 mg Oral BID    2/11/2020 no evidence of metastatic disease in the abdomen or pelvis. · CTA neck and head pending            Seen with multidisciplinary ICU team 2/11/2020. Meets Continued ICU Level Care Criteria:    [x] Yes   [] No - Transfer Planned to listed location:  [] HOSPITALIST CONTACTED-      Case discussed with Dr. Angelique Buitrago, Dr. Kieran Kenny, Dr. Analilia Herring, and KAREN Arroyo oncology     Electronically signed by Anyi Chan. KAREN Talavera - CNP  CRITICAL CARE SPECIALIST  Patient seen by me. Case discussed with NP. Patient extubated without difficulty. Case discussed with Dr. Kieran Kenny. I think surgical debulking may benefit patient. Will defer to Dr. Kieran Kenny and Dr Analilia Herring regarding best treatment options. Electronically signed by Nabor Buitrago MD.

## 2020-02-12 NOTE — PLAN OF CARE
Problem: Falls - Risk of:  Goal: Will remain free from falls  Description  Will remain free from falls  Outcome: Ongoing  Note:   Patient intubated and sedated. Bed alarmed armed. Bed wheels locked. Bedside table in reach. Hourly rounding being completed. Side rails upx 4.         Problem: Falls - Risk of:  Goal: Absence of physical injury  Description  Absence of physical injury  Outcome: Ongoing  Note:   Patient free of accidental injury. Patient intubated and sedated. Bed alarmed armed. Bed wheels locked. Bedside table in reach. Patient up with assistance when ambulating. Patient verbalizes and demonstrates the use of the call light. Hourly rounding being completed.        Problem: Risk for Impaired Skin Integrity  Goal: Tissue integrity - skin and mucous membranes  Description  Structural intactness and normal physiological function of skin and  mucous membranes. Outcome: Ongoing  Note:   Patient on a turn q2 hours and PRN schedule. Patient arms and legs are elevated on pillows. No redness noted behind ears, nose, heels, coccyx, shoulder blades, and elbows. Sacral protective dressing in place. Protective barrier applied to coccyx. Bernabe in place to prevent incontinence. Skin folds intact and no redness noted.         Problem: Discharge Planning:  Goal: Participates in care planning  Description  Participates in care planning  Outcome: Ongoing  Note:   Patient plans to return home with wife at discharge and no needs voiced at this time. Patient working with social work for discharge planning.         Problem: Discharge Planning:  Goal: Discharged to appropriate level of care  Description  Discharged to appropriate level of care  Outcome: Ongoing  Note:   Patient plans to return home with wife at discharge and no needs voiced at this time.  Patient working with social work for discharge planning.         Problem: Airway Clearance - Ineffective:  Goal: Ability to maintain a clear airway will

## 2020-02-12 NOTE — PROGRESS NOTES
Encounter Date: 2/12/2020    Portions of this notes is copied from previous physician encounters, reviewed and edited appropriately. Jeet Pham is a 53 y.o. 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 Total he had 8 witnessed seizure as per wife. Patient drinks every day. Patient was diagnosed as having malignant melanoma from his back lesion in 2018. His brain MRI confirmed there are multiple hemorrhagic lesions in the left temporal lobe and frontal lobe associated with edema. He has been started with steroids and Keppra. He has been evaluated by radiation oncology, neurosurgery. OVERNIGHT: no new issues. FOLLOWING COMMANDS. Review of systems   No neck stiffness  No photophobia  All systems reviewed and are negative.          Current Facility-Administered Medications   Medication Dose Route Frequency Provider Last Rate Last Dose    pantoprazole (PROTONIX) tablet 40 mg  40 mg Oral BID KAREN Chicas - CNP   40 mg at 02/11/20 2032    levETIRAcetam (KEPPRA) 1,000 mg in sodium chloride 0.9 % 100 mL IVPB  1,000 mg Intravenous Q12H Brie Cannon MD   Stopped at 02/11/20 2059    fentaNYL (SUBLIMAZE) injection 25 mcg  25 mcg Intravenous Q1H PRN Jennifer Romero APRN - CNP   25 mcg at 02/11/20 0808    chlorhexidine (PERIDEX) 0.12 % solution 15 mL  15 mL Mouth/Throat BID Jennifer Romero APRN - CNP   15 mL at 02/11/20 2032    Dexamethasone Sodium Phosphate injection 4 mg  4 mg Intravenous TID KAREN Chicas - CNP   4 mg at 83/59/07 1273    folic acid (FOLVITE) tablet 1 mg  1 mg Oral Daily Jennifer Romero APRN - CNP   1 mg at 02/11/20 1545    vitamin B-1 (THIAMINE) tablet 100 mg  100 mg Oral Daily KAREN Chicas - CNP   100 mg at 02/11/20 1545    propofol injection  50 mcg/kg/min Intravenous Titrated Eduard Rodrigues MD 25.2 mL/hr at 02/12/20 0513 45 mcg/kg/min at 02/12/20 0513    influenza quadrivalent split vaccine opacified. There are bilateral brain lesions. These are larger on the left than the right. Impression    1. 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 2. No stenosis of either carotid bulb. **This report has been created using voice recognition software. It may contain minor errors which are inherent in voice recognition technology. **    Final report electronically signed by Dr. Vadim Bhatia on 2/12/2020 8:03 AM     Results for orders placed during the hospital encounter of 02/10/20   CTA NECK W WO CONTRAST    Narrative PROCEDURE: CTA HEAD W [address], [place] [address] [address] INFORMATION: Possible aneurysm of the right internal carotid artery. History of melanoma. Brain lesions. COMPARISON: Head CT 2/12/2020. TECHNIQUE: 1 mm axial images were obtained through the head and neck after the fast bolus administration of contrast. A noncontrast localizer was obtained. 3-D reconstructions were performed on a dedicated 3-D workstation. These include multiplanar MPR   images and multiplanar MIP images. Centerline reconstructions were obtained of the carotid systems. Isovue intravenous contrast was given. All CT scans at this facility use dose modulation, iterative reconstruction, and/or weight-based dosing when appropriate to reduce radiation dose to as low as reasonably achievable. FINDINGS:      CTA NECK:    Aortic arch and branches: There is some minimal atherosclerosis of the aortic arch. There is no stenosis at the origin of the innominate artery, left common carotid artery or either subclavian artery. Right common carotid artery/ICA: The right common carotid artery is normal. The right carotid bulb is normal. The right internal carotid artery is normal. There is no stenosis. Left common carotid artery/ICA:  The left internal carotid artery is normal. There is a minimal area of soft plaque in the left carotid bulb. There is no stenosis. There is no stenosis of the left internal carotid artery. Vertebral arteries: The right vertebral artery is normal. The left vertebral artery is also normal. No stenosis on either side. 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 This projects posteriorly and inferiorly. There is no stenosis of either internal carotid artery. Middle cerebral arteries: Normal. The proximal branches are also normal.    Anterior cerebral arteries: Normal. The proximal branches are normal.     Vertebral arteries: The vertebral arteries are normal.    Basilar artery: Normal.    Superior cerebellar arteries: Normal.    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 The right P2 segment is supplied by the right posterior communicating artery. There is a normal left posterior cerebral artery. No other aneurysms are noted. There are no stenoses or occlusions. The superior sagittal sinus, vein of Antelmo, internal cerebral veins, straight sinus, transverse sinuses and sigmoid sinuses are patent. Axial source data: There is a right upper lobe soft tissue nodule. This was noted on a chest CT from one day ago. The patient is intubated and has an esophageal route tube in place. There is no cervical adenopathy. The right maxillary sinus is completely   opacified. There are bilateral brain lesions. These are larger on the left than the right. Impression    1. 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 2. No stenosis of either carotid bulb.           **This report has been created using voice recognition software. It may contain minor errors which are inherent in voice recognition technology. **    Final report electronically signed by Dr. Treasure De Jesus on 2/12/2020 8:03 AM     No results found for this or any previous visit. Results for orders placed during the hospital encounter of 02/10/20   MRI Brain W WO Contrast    Narrative PROCEDURE: MRI BRAIN W [address] INFORMATIONEvaluate hemorrhagic hyperdense masses seen on CT scan; seizures. COMPARISON: Head CT 2/10/2020. Head CT 1/15/2013. TECHNIQUE: Multiplanar and multiple spin echo T1 and T2-weighted images were obtained through the brain before and after the administration of intravenous contrast.    FINDINGS:    There are 14 enhancing masses in the brain. These are all intra-axial. Some of these are punctate. The 2 largest are in the left temporal lobe and paramedian left parietal lobe. The 3.0 x 2.4 cm left temporal lobe lesion has rim enhancement and central necrosis. There is some associated susceptibility artifact along its rim suggesting some blood products. The left parietal lobe lesion is oval in shape and measures 3.3 x 2.3 cm. This has susceptibility artifact within it and high central signal on the T1-weighted images. There is internal hemorrhage within this lesion. There are 2 smaller hemorrhagic lesions. These are in the superior left parietal lobe and the posterior left temporal lobe. There is a punctate hemorrhagic lesion in the periphery of the right cerebellum. On the postcontrast images, there are other scattered punctate foci of enhancement. Those other foci do not have associated hemorrhage. These mass lesions do not restrict diffusion. The brain volume is normal. There is no midline shift. There is no hydrocephalus. There is mass effect upon the atria of the left lateral ventricle. On the FLAIR and T2-weighted sequences, there is vasogenic edema surrounding the larger metastases.  This is in

## 2020-02-13 NOTE — PLAN OF CARE
Problem: Falls - Risk of:  Goal: Will remain free from falls  Description  Will remain free from falls  2/12/2020 2233 by Roberth Moore RN  Outcome: Ongoing  Note:   Pt remains free from falls this far in shift. Pt is confused. Bed in lowest position with bed alarm on. Problem: Falls - Risk of:  Goal: Absence of physical injury  Description  Absence of physical injury  2/12/2020 2233 by Roberth Moore RN  Outcome: Ongoing  Note:   No evidence of physical injury this far in shift. Problem: Risk for Impaired Skin Integrity  Goal: Tissue integrity - skin and mucous membranes  Description  Structural intactness and normal physiological function of skin and  mucous membranes. 2/12/2020 2233 by Roberth Moore RN  Outcome: Ongoing  Note:   Pt has scattered abrasion and bruising. Pt turned and repositioned q 2 hr and prn. Problem: Discharge Planning:  Goal: Discharged to appropriate level of care  Description  Discharged to appropriate level of care  2/12/2020 2233 by Roberth Moore RN  Outcome: Ongoing  Note:   Pt remains in ICU at this time. Pt plans to return home with wife when discharge is appropriate. Problem: Airway Clearance - Ineffective:  Goal: Ability to maintain a clear airway will improve  Description  Ability to maintain a clear airway will improve  2/12/2020 2233 by Roberth Moore RN  Outcome: Ongoing  Note:   Pt extubated today on 2L NC. O2 sat 94%. Pt able to protect airway at this time. Problem: Aspiration:  Goal: Absence of aspiration  Description  Absence of aspiration  2/12/2020 2233 by Roberth Moore RN  Outcome: Ongoing  Note:   No evidence of aspiration this far in shift. Problem: Bowel Function - Altered:  Goal: Bowel elimination is within specified parameters  Description  Bowel elimination is within specified parameters  2/12/2020 2233 by Roberth Moore RN  Outcome: Ongoing  Note:   No BM this far in shift. Pt BS hypoactive x4.       Problem: Cardiac Output - Decreased:  Goal: Hemodynamic stability will improve  Description  Hemodynamic stability will improve  2/12/2020 2233 by Cresencio Loza RN  Outcome: Ongoing  Note:   Pt vital signs within normal limits. Problem: Mental Status - Impaired:  Goal: Mental status will be restored to baseline  Description  Mental status will be restored to baseline  2/12/2020 2233 by Cresencio Loza RN  Outcome: Ongoing  Note:   Pt only oriented to self. Pt has short term memory loss. Problem: Pain:  Goal: Pain level will decrease  Description  Pain level will decrease  2/12/2020 2233 by Cresencio Loza RN  Outcome: Ongoing  Note:   Pt denies pain this far in shift. Care plan reviewed with patient and wife. Patient and wife verbalize understanding of the plan of care and contribute to goal setting.

## 2020-02-13 NOTE — PLAN OF CARE
Problem: Nutrition  Goal: Optimal nutrition therapy  Outcome: Ongoing   Nutrition Problem: Inadequate oral intake  Intervention: Food and/or Nutrient Delivery: Continue current diet, Start ONS  Nutritional Goals: consume greater than 75% meals during LOS

## 2020-02-13 NOTE — PROGRESS NOTES
decadron, folic acid, thiamine; glucose 160; discussed ONS options  · Wound Type: (per RN pt does not have wounds, but is  & has scrapes)  · Current Nutrition Therapies:  · Oral Diet Orders: General   · Oral Diet intake: 1-25%  · Oral Nutrition Supplement (ONS) Orders: Low Calorie High Protein Supplement(Ensure High Protein TID)  · ONS intake: Unable to assess(starting today)  · Anthropometric Measures:  · Ht: 5' 10\" (177.8 cm)   · Current Body Wt: 204 lb 2.3 oz (92.6 kg)(2/11 no edema)  · Admission Body Wt: 205 lb 14.6 oz (93.4 kg)(2/10 no edema)  · Usual Body Wt: 190 lb (86.2 kg)(per pt. and wife)  · Ideal Body Wt: 166 lb (75.3 kg),    · BMI Classification: BMI 25.0 - 29.9 Overweight(29.4)    Nutrition Interventions:   Continue current diet, Start ONS  Continued Inpatient Monitoring, Education Initiated, Coordination of Care(discussed ONS options for here and home; encouraged protein sources)    Nutrition Evaluation:   · Evaluation: Progressing toward goals   · Goals: consume greater than 75% meals during LOS    · Monitoring: Meal Intake, Supplement Intake, Diet Tolerance, Skin Integrity, Mental Status/Confusion, Weight, Pertinent Labs, Monitor Bowel Function      Electronically signed by Flavio Wood RD, LD on 2/13/20 at 11:23 AM    Contact Number: [phone]

## 2020-02-13 NOTE — PLAN OF CARE
care  Description  Discharged to appropriate level of care  Outcome: Not Met This Shift  Note:   Pt not ready for discharge at this time- will reevaluate daily     Problem: Airway Clearance - Ineffective:  Goal: Ability to maintain a clear airway will improve  Description  Ability to maintain a clear airway will improve  Outcome: Completed     Problem: Aspiration:  Goal: Absence of aspiration  Description  Absence of aspiration  Outcome: Completed     Problem: Cardiac Output - Decreased:  Goal: Hemodynamic stability will improve  Description  Hemodynamic stability will improve  Outcome: Completed     Problem: Fluid Volume - Imbalance:  Goal: Absence of imbalanced fluid volume signs and symptoms  Description  Absence of imbalanced fluid volume signs and symptoms  Outcome: Completed     Problem: Gas Exchange - Impaired:  Goal: Levels of oxygenation will improve  Description  Levels of oxygenation will improve  Outcome: Completed     Problem: Nutrition Deficit:  Goal: Ability to achieve adequate nutritional intake will improve  Description  Ability to achieve adequate nutritional intake will improve  Outcome: Completed     Problem: Pain:  Goal: Control of acute pain  Description  Control of acute pain  Outcome: Completed     Problem: Serum Glucose Level - Abnormal:  Goal: Ability to maintain appropriate glucose levels will improve to within specified parameters  Description  Ability to maintain appropriate glucose levels will improve to within specified parameters  Outcome: Completed     Problem: Skin Integrity - Impaired:  Goal: Will show no infection signs and symptoms  Description  Will show no infection signs and symptoms  Outcome: Completed     Problem: Skin Integrity - Impaired:  Goal: Absence of new skin breakdown  Description  Absence of new skin breakdown  Outcome: Completed     Problem: Tissue Perfusion, Altered:  Goal: Circulatory function within specified parameters  Description  Circulatory function within specified parameters  Outcome: Completed     Problem: Tissue Perfusion - Cardiopulmonary, Altered:  Goal: Absence of angina  Description  Absence of angina  Outcome: Completed     Problem: Tissue Perfusion - Cardiopulmonary, Altered:  Goal: Hemodynamic stability will improve  Description  Hemodynamic stability will improve  Outcome: Completed     Problem: Restraint Use - Nonviolent/Non-Self-Destructive Behavior:  Goal: Absence of restraint indications  Description  Absence of restraint indications  Outcome: Completed     Problem: Nutrition  Goal: Optimal nutrition therapy  2/13/2020 1440 by Tai Leblanc RN  Outcome: Completed   Care plan reviewed with patient and wife. Patient and wife verbalize understanding of the plan of care and contribute to goal setting.

## 2020-02-13 NOTE — PROCEDURES
[address] [place] [address]                          ELECTROENCEPHALOGRAM REPORT    PATIENT NAME: Young Hutchinson                       :        1975  MED REC NO:   [id number]                           ROOM:       0008  ACCOUNT NO:   [de-identified]                           ADMIT DATE: 02/10/2020  PROVIDER:     Phong Garrido. Tianna Gallagher MD    DATE OF EE2020    REFERRING PROVIDER:  _____    CLINICAL HISTORY:  A 66-year-old male with a seizure. He is on Precedex  and Diprivan discontinued prior to starting EEG. MEDICATIONS:  Listed are pantoprazole, Keppra, folic acid, thiamine,  insulin, propofol, dextrose, fentanyl. CLINICAL INTERPRETATION:  This is a 17-channel EEG performed without  sleep deprivation. Hyperventilation was not performed. Photic  stimulation was performed. The patient is described as on the  ventilator, follows commands. The background rhythm is noted to be slowed, poorly organized. Hyperventilation was not performed. Lead and muscle artifacts were  noted limiting quality of data obtained. There was no evidence of  epileptiform activity appreciated. Photic stimulation was performed  with no driving seen. IMPRESSION:  This is an abnormal EEG due to the slowed, poorly organized  background rhythm throughout the study suggestive of cortical  dysfunction such as seen with encephalopathy. However, there was no  evidence of epileptiform activity appreciated.         Benjamin Petersen MD    D: 2020 11:47:30       T: 2020 11:58:41     [email or web address]  Job#: [id number]     Doc#: [id number]    CC:

## 2020-02-13 NOTE — PROGRESS NOTES
Encounter Date: 2/13/2020    Portions of this notes is copied from previous physician encounters, reviewed and edited appropriately. Nelson Herrera is a 53 y.o. 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 Total he had 8 witnessed seizure as per wife. Patient drinks every day. Patient was diagnosed as having malignant melanoma from his back lesion in 2018. His brain MRI confirmed there are multiple hemorrhagic lesions in the left temporal lobe and frontal lobe associated with edema. He has been started with steroids and Keppra. He has been evaluated by radiation oncology, neurosurgery. OVERNIGHT: no new issues. Patient is alert awake still confused    Review of systems   No neck stiffness  No photophobia  All systems reviewed and are negative.          Current Facility-Administered Medications   Medication Dose Route Frequency Provider Last Rate Last Dose    lisinopril (PRINIVIL;ZESTRIL) tablet 5 mg  5 mg Oral Daily [facility]parkdereck Soria, APRN - CNP        pantoprazole (PROTONIX) injection 40 mg  40 mg Intravenous BID [facility]parkdereck Soria APRN - CNP   40 mg at 02/12/20 2048    levETIRAcetam (KEPPRA) 1,000 mg in sodium chloride 0.9 % 100 mL IVPB  1,000 mg Intravenous Q12H Carmelina Blanc MD   Stopped at 02/12/20 2141    Dexamethasone Sodium Phosphate injection 4 mg  4 mg Intravenous TID [facility] Estella, APRN - CNP   4 mg at 01/56/60 5264    folic acid (FOLVITE) tablet 1 mg  1 mg Oral Daily [facility] Heritage, APRN - CNP   1 mg at 02/12/20 1006    vitamin B-1 (THIAMINE) tablet 100 mg  100 mg Oral Daily [facility] Heritage, APRN - CNP   100 mg at 02/12/20 1006    influenza quadrivalent split vaccine (FLUZONE;FLUARIX;FLULAVAL;AFLURIA) injection 0.5 mL  0.5 mL Intramuscular Once Girish Koo MD        glucose (GLUTOSE) 40 % oral gel 15 g  15 g Oral PRN KAREN Ervin - CNP        dextrose 50 % IV solution  12.5 g Intravenous PRN Rae Chaves KAREN - CNP        glucagon (rDNA) injection 1 mg  1 mg Intramuscular PRN KAREN Maciel CNP        dextrose 5 % solution  100 mL/hr Intravenous PRN KAREN Maciel CNP         No Known Allergies    PHYSICAL EXAMINATION:    Vitals:   Patient Vitals for the past 4 hrs:   BP Temp Temp src Pulse Resp SpO2   02/13/20 0800 (!) 147/84 97.6 °F (36.4 °C) Oral 66 16 --   02/13/20 0753 -- -- -- -- -- 96 %   02/13/20 0703 139/70 -- -- 67 15 94 %   02/13/20 0603 (!) 150/73 -- -- 65 15 97 %   02/13/20 0503 132/78 -- -- 72 15 95 %     General: Patient is intubated and is on ventilator and oFF sedation, follows simple commands. HEENT: No pallor or icterus. Neck supple. No Carotid bruits. Heart: S1 and S2 normal with regular rhythm. No murmur. GENERAL NEUROLOGIC EXAM   Mental Status: Alert awake follows simple commands with slight confusion. Hence exam is limited. Cranial nerves: Fundi were normal bilaterally. No gaze deviation, pupils equal reacting to light . Brainstem reflexes intact. Motor: Moving bilateral extremities equally    Sensation: No sensory deficits  Reflexes: 1+ bilaterally symmetrical with down going toes. Coordination and gait cannot be tested as patient does not participate. Labs:     CBC:   Recent Labs     02/10/20  1353 02/11/20  0840 02/12/20  0405   WBC 16.9* 13.7* 18.6*   HGB 17.1 15.4 15.1    198 192   .0* 97.5* 99.4*   MCH 33.7* 32.4 32.5   MCHC 32.8 33.2 32.8     CMP:  Recent Labs     02/10/20  2237 02/11/20  0352  02/12/20  0218 02/12/20  0404 02/12/20  0603    138   < > 141 143 142   K 4.5 4.9  --   --  4.8  --     108  --   --  108  --    CO2 20* 19*  --   --  24  --    BUN 13 13  --   --  18  --    CREATININE 0.8 0.8  --   --  0.9  --    LABGLOM >90 >90  --   --  >90  --    GLUCOSE 129* 171*  --   --  148*  --    CALCIUM 8.5 8.8  --   --  9.1  --     < > = values in this interval not displayed.      Liver: side.        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 This projects posteriorly and inferiorly. There is no stenosis of either internal carotid artery. Middle cerebral arteries: Normal. The proximal branches are also normal.    Anterior cerebral arteries: Normal. The proximal branches are normal.     Vertebral arteries: The vertebral arteries are normal.    Basilar artery: Normal.    Superior cerebellar arteries: Normal.    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 The right P2 segment is supplied by the right posterior communicating artery. There is a normal left posterior cerebral artery. No other aneurysms are noted. There are no stenoses or occlusions. The superior sagittal sinus, vein of Antelmo, internal cerebral veins, straight sinus, transverse sinuses and sigmoid sinuses are patent. Axial source data: There is a right upper lobe soft tissue nodule. This was noted on a chest CT from one day ago. The patient is intubated and has an esophageal route tube in place. There is no cervical adenopathy. The right maxillary sinus is completely   opacified. There are bilateral brain lesions. These are larger on the left than the right. Impression    1. 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 2. No stenosis of either carotid bulb. **This report has been created using voice recognition software. It may contain minor errors which are inherent in voice recognition technology. **    Final report electronically signed by Dr. Singh Hidalgo on 2/12/2020 8:03 AM     Results for orders placed during the hospital encounter of 02/10/20   CTA [address] proximal branches are also normal.    Anterior cerebral arteries: Normal. The proximal branches are normal.     Vertebral arteries: The vertebral arteries are normal.    Basilar artery: Normal.    Superior cerebellar arteries: Normal.    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 The right P2 segment is supplied by the right posterior communicating artery. There is a normal left posterior cerebral artery. No other aneurysms are noted. There are no stenoses or occlusions. The superior sagittal sinus, vein of Antelmo, internal cerebral veins, straight sinus, transverse sinuses and sigmoid sinuses are patent. Axial source data: There is a right upper lobe soft tissue nodule. This was noted on a chest CT from one day ago. The patient is intubated and has an esophageal route tube in place. There is no cervical adenopathy. The right maxillary sinus is completely   opacified. There are bilateral brain lesions. These are larger on the left than the right. Impression    1. 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 2. No stenosis of either carotid bulb. **This report has been created using voice recognition software. It may contain minor errors which are inherent in voice recognition technology. **    Final report electronically signed by Dr. Gini Arenas on 2/12/2020 8:03 AM     No results found for this or any previous visit. Results for orders placed during the hospital encounter of 02/10/20   MRI Brain W WO Contrast    Narrative PROCEDURE: MRI BRAIN W [address] INFORMATIONEvaluate hemorrhagic hyperdense masses seen on CT scan; seizures. COMPARISON: Head CT 2/10/2020. Head CT 1/15/2013.     TECHNIQUE: Multiplanar and multiple spin echo T1 and T2-weighted images were obtained through the brain before and after the administration of intravenous contrast.    FINDINGS:    There are 14 enhancing masses in the brain. These are all intra-axial. Some of these are punctate. The 2 largest are in the left temporal lobe and paramedian left parietal lobe. The 3.0 x 2.4 cm left temporal lobe lesion has rim enhancement and central necrosis. There is some associated susceptibility artifact along its rim suggesting some blood products. The left parietal lobe lesion is oval in shape and measures 3.3 x 2.3 cm. This has susceptibility artifact within it and high central signal on the T1-weighted images. There is internal hemorrhage within this lesion. There are 2 smaller hemorrhagic lesions. These are in the superior left parietal lobe and the posterior left temporal lobe. There is a punctate hemorrhagic lesion in the periphery of the right cerebellum. On the postcontrast images, there are other scattered punctate foci of enhancement. Those other foci do not have associated hemorrhage. These mass lesions do not restrict diffusion. The brain volume is normal. There is no midline shift. There is no hydrocephalus. There is mass effect upon the atria of the left lateral ventricle. On the FLAIR and T2-weighted sequences, there is vasogenic edema surrounding the larger metastases. This is in the left parietal lobe and left temporal lobe. There are few smaller areas of edema. There is volume loss in the inferior frontal lobes bilaterally and the anterior right temporal lobe consistent with prior traumatic brain injury. That has been present on prior head CTs. On the gradient echo T2-weighted images, there is evidence of old subarachnoid hemorrhage in the right frontal lobe. This is along a prior ventriculostomy tract site. There is a prominent flow void near the expected location of the right posterior communicating artery. This could represent a tortuous internal carotid artery. An aneurysm is also a consideration. The midline craniocervical junction structures are normal.  The brainstem and pituitary gland are normal.    The right maxillary sinus is completely opacified. This has heterogeneous signal. There is a possible mass in the nasal cavity on the right which extends into the maxillary sinus. This could represent a polyp. Impression    1. Numerous enhancing masses in the brain. This is most compatible with metastatic disease. Some of these have associated internal hemorrhage. These range in size from punctate to 3.3 cm.  2. There is edema associated with the larger metastases. 3. Possible aneurysm of the right internal carotid artery. This could also represent tortuosity. A CTA or MRA of the head are recommended. 4. Opacified right maxillary sinus. There may be an associated obstructing right nasal polyp. **This report has been created using voice recognition software. It may contain minor errors which are inherent in voice recognition technology. **      Final report electronically signed by Dr. Reji Bello on 2/11/2020 9:01 AM     No results found for this or any previous visit. Results for orders placed during the hospital encounter of 02/10/20   CT HEAD WO CONTRAST    Narrative PROCEDURE: CT HEAD WO CONTRAST    CLINICAL INFORMATION: stroke . Multiple brain lesions. COMPARISON: Brain MRI 10/20/2020. TECHNIQUE: Noncontrast 5 mm axial images were obtained through the brain. Sagittal and coronal reconstructions were obtained. All CT scans at this facility use dose modulation, iterative reconstruction, and/or weight-based dosing when appropriate to reduce radiation dose to as low as reasonably achievable. FINDINGS:        There is no change in the hemorrhagic contusions in the medial left parietal lobe. The largest measures 3 cm    There is a small amount of edema surrounding each lesion. There is also small hemorrhagic lesion in the left occipital lobe.  There is some high attenuation

## 2020-02-13 NOTE — PROGRESS NOTES
78-year-old white male who presented to [facility] on 2/10/2020 with reports of seizures at home.  The past medical history of traumatic brain brain injury in 2009, current smoker, current EtOH abuse.  Per report patient was at home outside walking around when he came back inside wife states he fell to the ground and had a seizure.  Wife reports that he has had 8 seizures since this morning.  Wife denies any injuries occurring but he did hit the floor during the seizure.  Wife states that after the seizure he is confused and unable to recognize family. Theopolscott Sorto also reports that he is an everyday drinker and quit 2 days prior due to his  requiring him to stop drinking. Franklin Abbott denies any prior history of seizures, but does admit to a TBI in 2009 when he fell out of a moving truck. Franklin Abbott also admits about a year ago he had a mass removed from his back at the [facility] in [place] and did not follow-up. Kelly Weathers of care everywhere shows that a biopsy of a nodular lesion was taken at [facility] on 12/4/2018 pathology reports malignant melanoma.  Updated family on possibility for metastasis, Dr. Anna Trinh see tomorrow. Joe Mckinley continued on Keppra and Decadron every 8 hours. 2/11 oncology has seen, recommendations for chemo and radiation.  Radiation oncology Dr. Analilia Herring plans to see patient.  No plans for surgical intervention at this time. Lubertha Ormond if seizures are from metastasis to brain or EtOH withdrawal.  Patient continues on Precedex drip. 2/12 CTA head and neck pending. CPAP trial at this time, no plans for surgical intervention. 2/13 patient doing well, alert and oriented x3. Patient has been off Precedex, and will transfer to  today, for radiation as early as tomorrow. Past Medical History:  Per HPI  Family History:  No known family history  Social History:  Current 1.5 pack/day smoker x30 years.  Current alcohol abuse, denies substance abuse.     Review of Systems

## 2020-02-13 NOTE — CARE COORDINATION
2/13/20, 11:24 AM    DISCHARGE ON GOING EVALUATION    Chito Medley day: 3  Location: 4D-08/008-A Reason for admit: Seizures ([address] [R56.9]  Seizures ([address] [R56.9]   Procedure:   2/10 CT Head: Multiple mixed hyperdense/hemorrhagic lesions in the left temporoparietal lobes concerning for neoplasm. Largest in the temporal lobe is 3.1 x 2.3 with central hypoattenuation. Largest in the high parietal lobe is 3.2 x 2 cm and is uniformly hyperdense. Infection is thought to be less likely. Further evaluation with contrast-enhanced brain MRI is advised. No midline shift at this time however there is a fair amount of associated edema surrounding individual lesions. 2/10 Intubated  2/10 CXR: Bibasilar atelectasis, can't exclude infiltrate  2/10 MRI Brain: Numerous enhancing masses in the brain. This is most compatible with metastatic disease. Some of these have associated internal hemorrhage. These range in size from punctate to 3.3 cm; There is edema associated with the larger metastases; Possible aneurysm of the right internal carotid artery. This could also represent tortuosity; Opacified right maxillary sinus. 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 Interventional Neurology consulted for 8 mm aneurysm of carotid. No surgery planned at this time. Plan for whole head radiation. Tmax 99.1. NSR.  Sats 96% on 2L O2. Oriented to person only. Follows commands. Intensivist, Oncology, Radiation Oncology, and Neurosurgery following. Dietitian consulted. PT/OT. Seizure precautions. Cardiac monitoring, I&O, bhatt care. PO decadron U5E, folic acid, po keppra, lisinopril, protonix, thiamine. CO2 22, wbc 14.3. Order to transfer to ; awaiting bed assignment. Barriers to Discharge: n/a  PCP: No primary care provider on file. Readmission Risk Score: 10%  Patient Goals/Plan/Treatment Preferences: Home w/wife. Monitor PT/OT evals for possible needs. \    Called and spoke with Yaneth Gould at [employer]; patient does not have insurance, she states she will come and screen for possible Medicaid.

## 2020-02-13 NOTE — PROGRESS NOTES
[address] THERAPY MISSED TREATMENT NOTE  [facility] ICU 4D  4D-08/008-A      Date: 2020  Patient Name: Vincenza Carrel        CSN: [id number]   : 1975  (53 y.o.)  Gender: male                REASON FOR MISSED TREATMENT: Attempt x 1, Pt eating breakfast. Attempt x 2, Pt at cancer center. Will check back 2020.

## 2020-02-13 NOTE — PROGRESS NOTES
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 OBJECTIVE:  Range of Motion:  Bilateral Lower Extremity: WFL    Strength:  Bilateral Lower Extremity: Impaired - grossly 4-/5    Balance:  Static Sitting Balance:  Supervision  Dynamic Sitting Balance: Stand By Assistance  Static Standing Balance: Contact Guard Assistance  Dynamic Standing Balance: Contact Guard Assistance    Bed Mobility:  Supine to Sit: Stand By Assistance, with increased time for completion    Transfers:  Sit to Stand: [employer], cues for hand placement  Stand to [place] [id number], cues for hand placement    Ambulation:  Contact Guard Assistance  Distance: 3 ft with handhold only and 75 ft with RW  Surface: Level Tile  Device:Rolling Walker  Gait Deviations:  Slow Shirin, Decreased Step Length Bilaterally, Decreased Gait Speed, Narrow Base of Support, Mild Path Deviations and no LOB. Exercise:  Patient was guided in 1 set(s) 10 reps of exercise to both lower extremities. Ankle pumps, Glut sets, Quad sets, Long arc quads, Hip abduction/adduction and Seated hip flexion. Exercises were completed for increased independence with functional mobility. Functional Outcome Measures: Completed  AM-PAC Inpatient Mobility without Stair Climbing Raw Score : 16  AM-PAC Inpatient without Stair Climbing T-Scale Score : 45.54    ASSESSMENT:  Activity Tolerance:  Patient tolerance of  treatment: good. Treatment Initiated: Treatment and education initiated within context of evaluation. Evaluation time included review of current medical information, gathering information related to past medical, social and functional history, completion of standardized testing, formal and informal observation of tasks, assessment of data and development of plan of care and goals.   Treatment time included skilled education and facilitation of tasks to increase safety and independence with functional mobility for improved independence and quality of life. Assessment: Body structures, Functions, Activity limitations: Decreased functional mobility , Decreased strength, Decreased safe awareness, Decreased cognition, Decreased endurance, Decreased balance, Decreased coordination  Assessment: Pt is a 53 y.o. male with new onset of seizures and found to have lesions in the brain that are likely malignant melanoma. Pt participated well with mobility and exercises during the session. Pt needed verbal and sometimes visual cues to initiate exercises. Pt would benefit from continued skilled PT to address strengthening, balance, transfers, and gait training to improve safety with functional mobility. Prognosis: Good    REQUIRES PT FOLLOW UP: Yes    Discharge Recommendations:  Discharge Recommendations: Continue to assess pending progress, Patient would benefit from continued therapy after discharge    Patient Education:  PT Education: Goals, PT Role, Plan of Care, Home Exercise Program    Equipment Recommendations:   Other: monitor for needs    Plan:  Times per week: 6x N  Specific instructions for Next Treatment: ther ex, ther act, gait and balance training  Current Treatment Recommendations: Strengthening, Balance Training, Functional Mobility Training, Transfer Training, Endurance Training, Gait Training, Stair training, Home Exercise Program, Safety Education & Training, Patient/Caregiver Education & Training, Equipment Evaluation, Education, & procurement    Goals:  Patient goals : go home  Short term goals  Time Frame for Short term goals: at discharge  Short term goal 1: Pt to be Mod I for supine <> sit to get in/out of bed  Short term goal 2: Pt to be Supervision for sit <> stand to get up to ambulate  Short term goal 3: Pt to ambulate > 90 ft with or without AD with SBA for household distances  Short term goal 4: Pt to negotiate 1 step with SBA for home access  Long term goals  Time Frame for Long term goals : not set due to short ELOS    Following session, patient left in safe position with all fall risk precautions in place. Vimal Salcedo.  Matty Marina, [place] [address]

## 2020-02-13 NOTE — PROGRESS NOTES
Shayan, APRN - CNP        dextrose 50 % IV solution  12.5 g Intravenous PRN Rae Carly Riser, APRN - CNP        glucagon (rDNA) injection 1 mg  1 mg Intramuscular PRN Rae Carly Riser, APRN - CNP        dextrose 5 % solution  100 mL/hr Intravenous PRN Rae Carly Riser, APRN - CNP           PHYSICAL EXAM:       ECOG performance Status: 3   Pain Score: 0/10     There were no vitals taken for this visit. General:  NAD, Moderate confusion. Does not have a full understanding of situation. HEENT:  EOMI,  Thorax:  no stridor  COR:  Non-tachycardiac  Neuro:   Cranial nerves grossly intact; no focal deficits  Moderately confused to his situation but asked appropriate questions, sometimes repeatedly. Concerned about paying for medical bills. Skin - treatment portal: Just started treatment this afternoon. No RT reaction. Chemotherapy Update: None at this time       Treatment Imaging:  Port film imaging reviewed and approved. ASSESSMENT: No radiation side effects. Just began radiation therapy today. New medications, diagnostic results: none    PLAN: Again reviewed potential side effects of radiation for the patient's treatment. Continue local/topical care. Continue current radiation course as prescribed.

## 2020-02-13 NOTE — PROGRESS NOTES
KORY returned pt to room by cart from [facility]. Alert, oriented to direct questions but seems confused when talking about what was done at the [address] [facility].. Pt needs freq reminding about his illness. Cont to deny pain, blurred vision and double vision. 1530- Diet taken fair. 1550- Returned to bed with minimal assist.  1635- No changes in neuro assess at this time. 1705- Report called to  nurse.   [address] Transferred per wheelchair to  11.

## 2020-02-13 NOTE — PROGRESS NOTES
Seen and examined in the ICU. -This is a 45 [de-identified] old Male who presented to ER yesterday because of multiple seizures as associated with a decline in the level of the consciousness. While the patient was in the ER he became hypoxic and failed to protect his airway and as a results, patient was admitted and intubated.  -He underwent brain MRI that showed multiple brain lesions consistent with multiple brain metastatic lesion.    -In today Neurological exam: pt was sedated and intubated GCS 3T, pupils: equal and sluggish to light, no corneal reflex but has cough reflex. - Patient has a history of melanoma resected about 1 year ago. -There is no acute event over the night.  - Patient is still intubated and sedated. -There is no significant changes in patient neurological exam comparing to yesterday but the plan to be extubated today.  -We evaluate the patient and see his performance after he is extubated as well as his neurological exam and we will coordinate our recommendation recommendation and treatment plan with other treatment teams.  -The case in detail with other treatment teams (neurology oncology team and the ICU team). - Neurosurgery will follow. * Time spent with the patient was 25  minutes. More than 50% was spent counseling/coordinating the patient's care.

## 2020-02-14 NOTE — PROGRESS NOTES
Jul[address] Vee 58  INPATIENT OCCUPATIONAL THERAPY  [facility] NEUROSCIENCES 4A  EVALUATION    Time:    Time In: 5972  Time Out: 1010  Timed Code Treatment Minutes: 9 Minutes  Minutes: 24          Date: 2020  Patient Name: Henok Ch,   Gender: male      MRN: [id number]  : 1975  (53 y.o.)  Referring Practitioner: PAUL JONES CNP  Diagnosis: seizures  Additional Pertinent Hx: Per ER on 2/10/2020: 53 y.o. male who presents for seizures onset this morning, rating his current pain 7/10 in severity. Patient's wife reports the seizures onset this morning. She states the patient walked outside, came back inside, and then fell to the ground seizing. Patient's wife reports the patient has experienced 8 seizures since this morning. She states the 7 of the seizures affected the right side of his body, but the last seizure was the most intense and affected his entire body. Patient's wife denies any injuries occurring and denies that he hit his head with any of the seizures. The wife reports the patient's body will stiffen and jerk when he is seizures. They patient's mother reports episodes of vomiting in between seizures. Per CT of head: Multiple mixed hyperdense/hemorrhagic lesions in the left temporoparietal lobes concerning for neoplasm. Largest in the temporal lobe is 3.1 x 2.3 with central hypoattenuation. Largest in the high parietal lobe is 3.2 x 2 cm and is uniformly hyperdense on 2/10/2020.     Restrictions/Precautions:  Restrictions/Precautions: Fall Risk  Position Activity Restriction  Other position/activity restrictions: decreased cognition    Subjective  Chart Reviewed: Yes, Orders, Progress Notes, History and Physical  Patient assessed for rehabilitation services?: Yes  Family / Caregiver Present: Yes(wife)    Subjective: cooperative, impulsive, wife giving Pt's instructions during session    Pain:  Pain Assessment  Patient Currently in Pain: No    Social/Functional History:  Lives With:

## 2020-02-14 NOTE — PLAN OF CARE
Problem: Falls - Risk of:  Goal: Will remain free from falls  Description  Will remain free from falls  2/14/2020 0033 by Nichelle Stoner RN  Outcome: Ongoing  Note:   Patient was placed on fall precautions. Fall sign posted. Bed rails maintained at a minimum of 2/4. Call light in reach, bed in lowest position, bed alarm activated. Educated on use of call light before ambulation and when in need of assistance. Patient expressed understanding. Hourly visual checks performed and charted. Toileting offered to patient. No falls during shift, at this time. Arm band & falling star in place. Continuing to monitor. Problem: Falls - Risk of:  Goal: Absence of physical injury  Description  Absence of physical injury  2/14/2020 0033 by Nichelle Stoner RN  Outcome: Ongoing  Note:   Patient was placed on fall precautions. Fall sign posted. Bed rails maintained at a minimum of 2/4. Call light in reach, bed in lowest position, bed alarm activated. Educated on use of call light before ambulation and when in need of assistance. Patient expressed understanding. Hourly visual checks performed and charted. Toileting offered to patient. No falls during shift, at this time. Arm band & falling star in place. Continuing to monitor. Problem: Risk for Impaired Skin Integrity  Goal: Tissue integrity - skin and mucous membranes  Description  Structural intactness and normal physiological function of skin and  mucous membranes. 2/14/2020 0033 by Nichelle Stoner RN  Outcome: Ongoing  Note:   Patient skin assessment documented in flow sheets. Skin clean, dry, intact. Mucous membranes pink, moist. Patient turns self. Assistance with turns/ambulation provided PRN. Continue to monitor. Problem: Discharge Planning:  Goal: Participates in care planning  Description  Participates in care planning  2/14/2020 0033 by Nichelle Stoner RN  Outcome: Ongoing  Note:   Discharge planning in process and discussed with patient/family.  Social Kierra Leal RN  Outcome: Ongoing  Note:   Patient able to use 0-10 pain scale. Denies pain at this time. Patient has not complained of any pain this shift. Patient stated pain goal \"0/10\". Problem: Sleep Pattern Disturbance:  Goal: Appears well-rested  Description  Appears well-rested  Outcome: Ongoing  Note:   Pt has not rested well- encourage quiet times so pt may rest better. Avoid freq interruptions. Provide dark quiet environment   Care plan reviewed with patient and RN. Patient and RN verbalize understanding of the plan of care and contribute to goal setting.

## 2020-02-14 NOTE — PROGRESS NOTES
recently back from radiation and agreeable and very eager for therapy, pt was impulsive throughout session and demonstrated decreased safety awareness and insight to his deficits and condition, he cont to talk about needing to get home to get back to work even though he demonstrated decreased steadiness w/ just gait and activity, pt also demonstrated some difficulty following multi step directions and required demonstration often during therapy      PAIN: 0/10:       OBJECTIVE:    Transfers:  Sit to Stand: [address], to SBA pt very impulsive and will stand w/o warning   Stand to [facility], cues given for safety to back all the way up to surface     Ambulation:  Contact Guard Assistance to min assist   Distance: 80x2  Surface: Level Tile  Device:No Device  Gait Deviations:  Fair nataly however min to mod path deviation w/ scissor pattern at times and unable to keep a straight path, he had several loss of balance episodes taking several steps to correct balance     Balance:  pt completed dynmaic balance activity standing on blue foam tossing ball and catching it iwth CGA to min assist for balance he demonstrated decreased safety of when trying to reach to far out of base of support he was able to catch ~ 50% of the time  , pt stepping over hurdles, weaving in and out of dots while kicking bean bag with quick directional changes w/ CGA to min assist     Exercise:  Patient was guided in 1 set(s) 10 reps of exercise to both lower extremities. Standing heel/toe raises, Standing marches, Standing hip abduction/adduction, Standing hip extension, Standing hamstring curls, Mini squats, Lunges, Step ups and w/ one to telma UEs at support, pt required max cues for technique of exercises . Exercises were completed for increased independence with functional mobility.     Functional Outcome Measures: Completed  AM-PAC Inpatient Mobility without Stair Climbing Raw Score : 15  AM-PAC Inpatient

## 2020-02-14 NOTE — PROGRESS NOTES
Hospitalist Progress Note    Patient:  Debbie Hart      Unit/Bed:4A-11/011-A    YOB: 1975    MRN: [id number]       Acct: [de-identified]     PCP: No primary care provider on file. Date of Admission: 2/10/2020      Assessment/Plan:  Active Hospital Problems    Diagnosis Date Noted    Acute respiratory failure ([address] [J96.00]     Seizures ([address] [R56.9] 02/10/2020       1. New onset Seizure: likely from lesions in brain with edema. Also EtOH abuse so this likely contributed as well. 1. Decadron as noted below  2. Russell Monreal initiated. Neurology following. 3. Seizure precautions  2. Brain neoplasm: Noted multiple areas hemorrhagic lesions in brain  1. MRI shows \"Numerous enhancing masses in the brain. This is most compatible with metastatic disease. Some of these have associated internal hemorrhage. These range in size from punctate to 3.3 cm. There is edema associated with the larger metastases. \"  2. Consult neurosurgery.  One time dose of 10 mg of Decadron, followed by 4 mg 3 times daily.    1. No plans for operative intervention  3. Consult to [place] [facility] Dr. Ruthie Bernheim following and starting RT to brain (2/14/20 first rx)  3. 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 Discussed with Neurosurgery -> patient is not a candidate for intervention and this can be monitored   1. Will discuss alcohol cessation, BP control, and smoking cessation as alternative risk factor modification  4. Lung nodules: Noted on CT scan 2/11, most likely metastasis.  Oncology has been consulted. 5. Acute respiratory failure: Intubated in the ER for airway protection 2/10, extubated 2/12, continue on nasal cannula  1. Weaned to room air. IS  6.  Malignant melanoma: Noted to have lesion from back removed February 2019.  Patient did not follow-up, was to have PET [facility] at [facility].  This was not completed, family states that patient did not want to have a PET Rad Onc and he has been started on XRT on 2/14. Neurosurgery has no plans for operative intervention, and continue patient on decadron and Neurology is managing Keppra. Very poor insight to condition, suspected underlying cognitive deficit. Subjective (past 24 hours):  Patient is seen at bedside chair. He denies any complaints. Is a bit tangential and seems to have poor insight into condition, and disoriented to location. Denies headache, LH, visual issues, trouble speaking or swallowing. No numbness or weakness. To get RT today to brain. Medications:  Reviewed    Infusion Medications    dextrose       Scheduled Medications    lisinopril  5 mg Oral Daily    pantoprazole  40 mg Oral BID AC    levETIRAcetam  1,000 mg Oral BID    folic acid  1 mg Oral Daily    thiamine  100 mg Oral Daily    influenza virus vaccine  0.5 mL Intramuscular Once     PRN Meds: glucose, dextrose, glucagon (rDNA), dextrose      Intake/Output Summary (Last 24 hours) at 2/14/2020 0750  Last data filed at 2/14/2020 0400  Gross per 24 hour   Intake 500 ml   Output --   Net 500 ml     Weight change:       Exam:  BP (!) 140/86   Pulse 64   Temp 97.6 °F (36.4 °C) (Oral)   Resp 16   Ht 5' 10\" (1.778 m)   Wt 199 lb 14.4 oz (90.7 kg)   SpO2 94%   BMI 28.68 kg/m²     General appearance: Dissheveled, NAD, appears older than stated age. Eyes:  Pupils equal, round, and reactive to light. Conjunctivae/corneas clear. HENT: Head normal in appearance. External nares normal.  Oral mucosa moist without lesions. Hearing grossly intact. Neck: Supple, with full range of motion. No jugular venous distention. Trachea midline. Respiratory:  Normal respiratory effort. Clear to auscultation, bilaterally without rales or wheezes or Rhonchi. Cardiovascular: Normal rate, regular rhythm with normal S1/S2 without murmurs. No lower extremity edema. Abdomen: Soft, non-tender, non-distended with normal bowel sounds.   Musculoskeletal: Normal range of motion in extremities. There is no joint swelling or tenderness. Skin: Skin color, texture, turgor normal.  No rashes or lesions. Neurologic:  Neurovascularly intact without any focal sensory/motor deficits in the upper and lower extremities. Cranial nerves:  grossly non-focal.  Psychiatric: Alert and disoriented (thinks in bank, but knows month, year, and reason for visit), thought content impaired  Capillary Refill: Brisk,< 3 seconds. Peripheral Pulses: +2 palpable, equal bilaterally. Labs:   Recent Labs     02/11/20  0840 02/12/20  0405 02/13/20  0945   WBC 13.7* 18.6* 14.3*   HGB 15.4 15.1 15.1   HCT 46.4 46.1 45.1    192 194     Recent Labs     02/12/20  0404 02/12/20  0603 02/13/20  0945    142 141   K 4.8  --  4.1     --  106   CO2 24  --  22*   BUN 18  --  23*   CREATININE 0.9  --  0.8   CALCIUM 9.1  --  9.2     No results for input(s): AST, ALT, BILIDIR, BILITOT, ALKPHOS in the last 72 hours. No results for input(s): INR in the last 72 hours. No results for input(s): Milo Jiang in the last 72 hours. Microbiology:      Urinalysis:      Lab Results   Component Value Date    NITRU NEGATIVE 02/10/2020    WBCUA 0-2 02/10/2020    BACTERIA NONE SEEN 02/10/2020    RBCUA 3-5 02/10/2020    BLOODU TRACE 02/10/2020    SPECGRAV 1.011 02/10/2020       Radiology:  CT GUIDE RADIATION THERAPY NO CHARGE   Final Result      RADIOLOGY REPORT   Final Result      CTA NECK W WO CONTRAST   Final Result       1. 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 2. No stenosis of either carotid bulb. **This report has been created using voice recognition software. It may contain minor errors which are inherent in voice recognition technology. **      Final report electronically signed by Dr. Tina Potts on 2/12/2020 8:03 AM      CTA HEAD W Nancy Robb in the temporal lobe is 3.1 x 2.3 with central hypoattenuation. Largest in the high parietal lobe is 3.2 x 2 cm and is uniformly hyperdense. Infection is thought to be less likely. Further evaluation with contrast-enhanced brain MRI is advised. No midline shift at this time however there is a fair amount of associated edema surrounding individual lesions. These results were communicated directly with Vic Llamas, [facility] on 2/10/2020 2:46 PM,  [ ]         **This report has been created using voice recognition software. It may contain minor errors which are inherent in voice recognition technology. **      Final report electronically signed by Dr. Sulaiman Pagan on 2/10/2020 2:48 PM          DVT prophylaxis: [] Lovenox                                  [x] SCDs                                 [] SQ Heparin                                 [] Encourage ambulation           [] Already on Anticoagulation     Code Status: Full Code    PT/OT Eval Status: yes    Diet:   DIET GENERAL;  Dietary Nutrition Supplements: Low Calorie High Protein Supplement    Fluids: n/a    Tele:   [x] yes             [] no      Electronically signed by Ean Matta DO on 2/14/2020 at 7:50 AM

## 2020-02-14 NOTE — PROGRESS NOTES
Pt alert and oriented x2, to self and date. Pupils equal and reactive to light. Lung sounds are diminished. S1 S2 heart tones heard. Hand grasp strong and equal bilaterally. Bowel sounds present x4. Abdomen is round, soft, and non tender. Pedal push and pull is strong and equal bilaterally. Call light and bedside table are within reach. Pt denies pain at this time.

## 2020-02-15 NOTE — PROGRESS NOTES
scan completed at [facility].  This was not completed, family states that patient did not want to have a PET scan. 1. Very poor insight  2. Involve palliative care  3. Oncology consulted - will follow as OP. 7. Leukocytosis: no fevers. Procal negative. Suspect reactive to metastatic disease. 8. Acute alcohol withdrawal: Unclear if seizures are from alcohol withdrawal or brain metastasis.  No symptoms at this time  9. Hypertension: lisinopril 5 mg daily, will increase to 10mg given elevated BP. 1. Suspect some HTN related to steroids. 2. Goal <140 with aneurysm. 10. Non Anion gap metabolic acidosis: POA, likely related to seizures, resolving. 11. Diabetes mellitus: improved; A1c 6.0, family denies use of insulin at home, notes reviewed from [facility] states patient is diabetic. 1. Glucose improved, stopped insulin coverage   2. Monitor while on steroids  12. Elevated liver enzymes: improved;  Known EtOH abuse, monitor. 1. Resolved. No need to trend. 13. Alcohol abuse: reports 12 pack daily for years. Denies prior withdrawals or seizures. 1. MVI, Thiamine, Folvite  2. CIWA for monitoring  3. Addiction consult. 14. Roman's esophagus: PPI twice daily   15. Hx of TBI: in 2009 with ? Residual cognitive deficit  16. Cognitive Deficit: may be related to TBI vs EtOH vs metastatic brain lesions with edema. 17. Tobacco abuse: counseling, offer NRT      Expected discharge date:  1-3 days    Disposition: tbd - likely would benefit from SNF for therapy compliance and ongoing therapy. [] Home       [] TCU       [] Rehab       [] Psych       [] SNF       [] [place]       [] Other-    --------------------------------------------    Chief Complaint: seizures    Hospital Course: Venda Brunner is a 51yo M with PMHx of TBI and melanoma (s/p excision) who presents after apparent seizure like activity. He was initially sent to the ICU after intubation for airway protection.   Imaging of the brain revealed multiple lesions with hemorrhagic component/edema consistent with a metastatic process - presumably from Melanoma that he did not receive treatment for. Oncology has been consulted, as has Rad Onc and he has been started on XRT on 2/14. Neurosurgery has no plans for operative intervention, and continue patient on decadron and Neurology is managing Keppra. Very poor insight to condition, suspected underlying cognitive deficit. Subjective (past 24 hours):  Patient is seen at bedside chair. He is with his wife, and friend. Currently he denies any issues. Very tangential. No chest pain, nausea, headache, lightheadedness, weakness or numbness. Medications:  Reviewed    Infusion Medications    dextrose       Scheduled Medications    sodium chloride flush  10 mL Intravenous 2 times per day    multivitamin  1 tablet Oral Daily    lisinopril  10 mg Oral Daily    pantoprazole  40 mg Oral BID AC    levETIRAcetam  1,000 mg Oral BID    folic acid  1 mg Oral Daily    thiamine  100 mg Oral Daily    influenza virus vaccine  0.5 mL Intramuscular Once     PRN Meds: sodium chloride flush, glucose, dextrose, glucagon (rDNA), dextrose      Intake/Output Summary (Last 24 hours) at 2/15/2020 1504  Last data filed at 2/15/2020 0343  Gross per 24 hour   Intake 1100 ml   Output --   Net 1100 ml     Weight change: -2 lb 1.6 oz (-0.953 kg)      Exam:  /75   Pulse 76   Temp 98.1 °F (36.7 °C) (Oral)   Resp 18   Ht 5' 10\" (1.778 m)   Wt 197 lb 12.8 oz (89.7 kg)   SpO2 97%   BMI 28.38 kg/m²     General appearance: Dissheveled, NAD, appears older than stated age. Eyes:  Pupils equal, round, and reactive to light. Conjunctivae/corneas clear. HENT: Head normal in appearance. External nares normal.  Oral mucosa moist without lesions. Hearing grossly intact. Poor dentition. Neck: Supple, with full range of motion. No jugular venous distention. Trachea midline.   Respiratory:  Normal **This report has been created using voice recognition software. It may contain minor errors which are inherent in voice recognition technology. **      Final report electronically signed by Dr. Tony Lee MD on 2/11/2020 1:28 PM      XR CHEST PORTABLE   Final Result   1. Interval placement of right subclavian central venous catheter with tip overlying the cavoatrial junction. No other acute cardiopulmonary findings are otherwise seen. There is slightly improved aeration of the lung bases when compared to prior    examination dated 2/10/2020. **This report has been created using voice recognition software. It may contain minor errors which are inherent in voice recognition technology. **      Final report electronically signed by Dr. Golden Cramer on 2/11/2020 10:17 AM      MRI Brain W WO Contrast   Final Result       1. Numerous enhancing masses in the brain. This is most compatible with metastatic disease. Some of these have associated internal hemorrhage. These range in size from punctate to 3.3 cm.   2. There is edema associated with the larger metastases. 3. Possible aneurysm of the right internal carotid artery. This could also represent tortuosity. A CTA or MRA of the head are recommended. 4. Opacified right maxillary sinus. There may be an associated obstructing right nasal polyp. **This report has been created using voice recognition software. It may contain minor errors which are inherent in voice recognition technology. **         Final report electronically signed by Dr. Moy Zuleta on 2/11/2020 9:01 AM      XR CHEST PORTABLE   Final Result   1. Endotracheal tube and NG tube in good position. 2. Bibasilar atelectasis can't exclude infiltrate. **This report has been created using voice recognition software. It may contain minor errors which are inherent in voice recognition technology. **      Final report electronically signed by Dr. Velma Estevez

## 2020-02-15 NOTE — CONSULTS
1005  AOD consult attempted, pt declined however receptive to receiving resources (inpatient/outpatient AOD) given by Clinician.

## 2020-02-15 NOTE — PLAN OF CARE
Problem: Falls - Risk of:  Goal: Will remain free from falls  Description  Will remain free from falls  by Jennifer Taylor RN  Outcome: Ongoing  Note:   Patient was placed on fall precautions. Fall sign posted. Bed rails maintained at a minimum of 2/4. Call light in reach, bed in lowest position, bed alarm activated. Educated on use of call light before ambulation and when in need of assistance. Patient expressed understanding. Hourly visual checks performed and charted. Toileting offered to patient. No falls during shift, at this time. Arm band & falling star in place. Continuing to monitor. Problem: Falls - Risk of:  Goal: Absence of physical injury  Description  Absence of physical injury  by Jennifer Taylor RN  Outcome: Ongoing  Note:   Patient was placed on fall precautions. Fall sign posted. Bed rails maintained at a minimum of 2/4. Call light in reach, bed in lowest position, bed alarm activated. Educated on use of call light before ambulation and when in need of assistance. Patient expressed understanding. Hourly visual checks performed and charted. Toileting offered to patient. No falls during shift, at this time. Arm band & falling star in place. Continuing to monitor. Problem: Risk for Impaired Skin Integrity  Goal: Tissue integrity - skin and mucous membranes  Description  Structural intactness and normal physiological function of skin and  mucous membranes. by Jennifer Taylor RN  Outcome: Ongoing  Note:   Patient skin assessment documented in flow sheets. Skin clean, dry, intact. Mucous membranes pink, moist. Patient turns self. Assistance with turns/ambulation provided PRN. Continue to monitor. Problem: Discharge Planning:  Goal: Participates in care planning  Description  Participates in care planning  by Jennifer Taylor RN  Outcome: Ongoing  Note:   Discharge planning in process and discussed with patient/family. Social work consulted for any additional needs.  Care manager aware of discharge needs. Patient is from home with wife. Patient would like to return home with wife at discharge. Patient has radiation tomorrow round 3 of 10. Problem: Discharge Planning:  Goal: Discharged to appropriate level of care  Description  Discharged to appropriate level of care  by Kassidy Jeff RN  Outcome: Ongoing  Note:   Discharge planning in process and discussed with patient/family. Social work consulted for any additional needs. Care manager aware of discharge needs. Patient is from home with wife. Patient would like to return home with wife at discharge. Patient has radiation tomorrow round 3 of 10. Problem: Anxiety/Stress:  Goal: Level of anxiety will decrease  Description  Level of anxiety will decrease   by Kassidy Jeff RN  Outcome: Ongoing  Note:   Patient does not appear to be anxious or stressed this shift. Problem: Bowel Function - Altered:  Goal: Bowel elimination is within specified parameters  Description  Bowel elimination is within specified parameters  by Kassidy Jeff RN  Outcome: Ongoing  Note:   Active bowel sounds to be noted this shift. Problem: Mental Status - Impaired:  Goal: Mental status will be restored to baseline  Description  Mental status will be restored to baseline   by Kassidy Jeff RN  Outcome: Ongoing  Note:   Patient appears to have short term memory loss. Alert and oriented x3. Disoriented to situation. Problem: Pain:  Goal: Pain level will decrease  Description  by Kassidy Jeff RN  Outcome: Ongoing  Note:   Patient able to use 0-10 pain scale. Denies pain at this time. Patient has not complained of any pain this shift. Patient stated pain goal \"0/10\". Problem: Pain:  Goal: Recognizes and communicates pain  Description  Recognizes and communicates pain  by Kassidy Jeff RN  Outcome: Ongoing  Note:   Patient able to use 0-10 pain scale. Denies pain at this time. Patient has not complained of any pain this shift.  Patient

## 2020-02-15 NOTE — PROGRESS NOTES
approved session. Patient laying in bed upon arrival. Patient agreeable and motivated for therapy. Patient wife present in room but did not observe session. Patient very talkative throughout session and requires cues to stay on task. PAIN: denies    OBJECTIVE:  Bed Mobility:  Supine to Sit: Supervision, with head of bed raised    Transfers:  Sit to Stand: Contact Guard Assistance  Stand to Sit:Stand By Assistance, Contact Guard Assistance    Ambulation:  Contact Guard Assistance  Distance: ~80ft x2  Surface: Level Tile  Device:No Device  Gait Deviations: Forward Flexed Posture, Slow Shirin, Decreased Step Length Bilaterally, Decreased Trunk Rotation, Decreased Gait Speed, Decreased Heel Strike Bilaterally and Mild Path Deviations    Stairs:  Contact Guard Assistance  Number of Steps: 1 (forward/lateral step ups x10 reps )  Height: 6\" step with single to BUE support on rails    Balance:  Dynamic Standing Balance: Contact Guard Assistance        -toe taps using R/L onto colored pods placed on first step, able to follow 3-4 step sequencing fairly well, used single to BUE support, slight unsteady at times      -stepping over hurdles leading R/L and side stepping, CGA, unsteadiness noted, no major LOB, trunk sway, decreased arm swing  Static standing balance: close SBA, patient conversing with therapist, ~3min, mod trunk sway posteriorly, no UE support    Exercise:  None this session. Functional Outcome Measures: Completed  AM-PAC Inpatient Mobility without Stair Climbing Raw Score : 15  AM-PAC Inpatient without Stair Climbing T-Scale Score : 43.03    ASSESSMENT:  Assessment: Patient progressing toward established goals. Activity Tolerance:  Patient tolerance of  treatment: good. Equipment Recommendations: Other: monitor for needs  Discharge Recommendations:  (pt would benefit from cont therapy at discharge, he may benefit from a SNF therapy stay will require 24 hour supervision/assist, pt would benefit

## 2020-02-16 NOTE — PLAN OF CARE
Problem: Pain:  Goal: Pain level will decrease  Description  Pain level will decrease  Outcome: Met This Shift  Note:   PT denies any pain this shift using a 0-10 pain scale. Problem: Serum Glucose Level - Abnormal:  Goal: Ability to maintain appropriate glucose levels will improve to within specified parameters  Description  Ability to maintain appropriate glucose levels will improve to within specified parameters   Outcome: Met This Shift  Note:   PT blood sugars within normal range this shift and stayed below 150. PT taking Decadron 4mg every 8 hours. Problem: Falls - Risk of:  Goal: Will remain free from falls  Description  Will remain free from falls  Outcome: Ongoing  Note:   PT bed alarm on, bed in lowest position, call light in reach. PT remains free from falls this shift thus far. PT encouraged to use call light when needed to use facilities. PT educated the bed/chair alarms are for his safety and that's why he needs them on. Problem: Discharge Planning:  Goal: Participates in care planning  Description  Participates in care planning  Outcome: Ongoing  Note:   Discharge in process with patient and wife. PT is ready to go home. Explained to PT he needs radiation treatment before he can return to home. Problem: Anxiety/Stress:  Goal: Level of anxiety will decrease  Description  Level of anxiety will decrease  Outcome: Ongoing  Note:   PT was slightly anxious during this shift. Nursing staff took PT for a walk and had a therapeutic conversation with him. Problem: Mental Status - Impaired:  Goal: Mental status will be restored to baseline  Description  Mental status will be restored to baseline  Outcome: Ongoing  Note:   PT has short term memory loss. PT has become a little more agitated this shift. Trying to redirect Pt when agitation arises. PT is A+Ox3, PT is disoriented to situation. Explained to PT why he is here when disoriented to situation.       Problem: Discharge

## 2020-02-16 NOTE — PLAN OF CARE
stated pain goal \"0/10\". Problem: Sleep Pattern Disturbance:  Goal: Appears well-rested  Description  Appears well-rested  Outcome: Ongoing  Note:   Pt has not rested well- encourage quiet times so pt may rest better. Avoid freq interruptions. Provide dark quiet environment      Care plan reviewed with patient and RN. Patient and RN verbalize understanding of the plan of care and contribute to goal setting.

## 2020-02-16 NOTE — PLAN OF CARE
Problem: Anxiety/Stress:  Goal: Level of anxiety will decrease  Description  Level of anxiety will decrease  Outcome: Met This Shift  Note:   PT did not complain of anxiety this shift, PT does not appear to be anxious either. Problem: Pain:  Goal: Pain level will decrease  Description  Pain level will decrease  Outcome: Met This Shift  Note:   PT denied pain the whole shift. Using a 0/10 pain scale. Problem: Falls - Risk of:  Goal: Will remain free from falls  Description  Will remain free from falls  Outcome: Ongoing  Note:   PT remained free from falls. PT bed/chair alarm in use the whole shift. PT sometimes non-compliant with alarms and gets up anyway's. PT told to wait for nursing staff and to call out for help. Goal: Absence of physical injury  Description  Absence of physical injury  Outcome: Ongoing  Note:   Hourly visual checks performed. Fall band in place, falling star posted. Educated PT to use call light when needing to get up for something. PT remained free from physical injury this shift. Nursing staff assisted with gait/transfer. Call light in reach. Bed in lowest position. Bed/chair alarm placed. Problem: Risk for Impaired Skin Integrity  Goal: Tissue integrity - skin and mucous membranes  Description  Structural intactness and normal physiological function of skin and  mucous membranes. Outcome: Ongoing  Note:   PT turns self, skin assessment clean, dry, intact and pink. Mucous membranes pink and moist, PT drinking adequate oral fluids to stay hydrated. Problem: Discharge Planning:  Goal: Participates in care planning  Description  Participates in care planning  Outcome: Ongoing  Note:   Discharge planning/process discussed with patient and wife. Re-iterating to PT the extent of his condition and why he can not go home yet. PT anxious to go home. PT told multiple times that he has multiple radiation treatments to go through first.      Problem:  Bowel Function - Altered:  Goal: Bowel elimination is within specified parameters  Description  Bowel elimination is within specified parameters  Outcome: Ongoing  Note:   PT bowel sounds active, present and audible the whole shift. PT still passing gas. Problem: Sleep Pattern Disturbance:  Goal: Appears well-rested  Description  Appears well-rested  Outcome: Ongoing  Note:   PT had family visitors the whole shift. PT did not get adequate rest during quiet time. PT encourage to rest and allow time to heal with no distractions.       Problem: Airway Clearance - Ineffective:  Goal: Ability to maintain a clear airway will improve  Description  Ability to maintain a clear airway will improve  2/15/2020 1712 by Fitz Reese RN  Outcome: Completed     Problem: Cardiac Output - Decreased:  Goal: Hemodynamic stability will improve  Description  Hemodynamic stability will improve  2/15/2020 1716 by Fitz Reese RN  Outcome: Completed     Problem: Fluid Volume - Imbalance:  Goal: Absence of imbalanced fluid volume signs and symptoms  Description  Absence of imbalanced fluid volume signs and symptoms  2/15/2020 1716 by Fitz Reese RN  Outcome: Completed     Problem: Gas Exchange - Impaired:  Goal: Levels of oxygenation will improve  Description  Levels of oxygenation will improve  2/15/2020 1716 by Fitz Reese RN  Outcome: Completed     Problem: Nutrition Deficit:  Goal: Ability to achieve adequate nutritional intake will improve  Description  Ability to achieve adequate nutritional intake will improve  2/15/2020 1716 by Fitz Reese RN  Outcome: Completed     Problem: Skin Integrity - Impaired:  Goal: Will show no infection signs and symptoms  Description  Will show no infection signs and symptoms  2/15/2020 1715 by Fitz Reese RN  Outcome: Completed  Goal: Absence of new skin breakdown  Description  Absence of new skin breakdown  2/15/2020 1715 by Fitz Reese RN  Outcome: Completed     Problem: Tissue Perfusion,

## 2020-02-17 NOTE — PROGRESS NOTES
Problem Solving, Decreased Safety Awareness, Impulsive and Tangential. Pt oriented to place and person, however, unable to recall why he came to the hospital and then becoming tangential with conversation. ADL:   No ADL's completed this session. Comment: Pt's wife saying he completed full body dress on own prior to therapists arrival    BALANCE:  Sitting Balance:  Independent. Standing Balance: Stand By Assistance. x10 minutes for dynamic standing activity and ther ex. TRANSFERS:  Sit to Stand:  Stand By Assistance. Stand to Sit: Stand By Assistance. FUNCTIONAL MOBILITY:  Assistive Device: None  Assist Level:  Contact Guard Assistance. Distance: Completed functional mobility within unit hallway back to room at quick pace, no LOB noted. Pt provided seated rest break after trial of mobility, mod fatigue noted. ADDITIONAL ACTIVITIES:  Pt participated in dynamic standing activity to challenge balance and facilitate functional reach into various planes with BUE and 2 UE release. Pt stood for 5 minutes with SBA before requiring seated rest break, min fatigue noted. Completed to increase safety with dynamic standing required for showering tasks. Completed BUE AROM exercises x10 reps x1 sets in all joints/planes while standing with 2 UE release to increase strength and endurance required for ADLs. Pt required rest break between each exercise and min v/c for proper technique. ASSESSMENT:     Activity Tolerance:  Patient tolerance of  treatment: fair. Pt limited by decreased safety awareness. Discharge Recommendations: Continue to assess pending progress, 24 hour supervision or assist  Equipment Recommendations:  Other: Monitor for shower chair needed  Plan: Times per week: 5x  Current Treatment Recommendations: Functional Mobility Training, Safety Education & Training, Cognitive Reorientation, Balance Training, Self-Care / ADL    Patient Education  Patient Education: Home Exercise Program, Home Safety, Assistive Device Safety and dynamic standing balance, Safety with transfers and mobility. Goals  Short term goals  Time Frame for Short term goals: 2 weeks  Short term goal 1: Complete BADL routine with S & 0-2 vcs for safety & sequencing  Short term goal 2: Complete grooming tasks at sink with S & 0-2 vcs for sequencing task  Short term goal 3: Complete 8-10 min dynamic standing task with S & 0-2 vcs for safety  Short term goal 4: Complete mobility to/from bathroom + in hallways with S & 0-2 vcs for pathfinding & avoiding obstacles on R side  Long term goals  Time Frame for Long term goals : No LTG set d/t short ELOS    Following session, patient left in safe position with all fall risk precautions in place.

## 2020-02-17 NOTE — PROGRESS NOTES
[address] [address]  INPATIENT PHYSICAL THERAPY  DAILY NOTE  [facility] 4A - 4A-11/011-A      Time In: 4849  Time Out: 0820  Timed Code Treatment Minutes: 24 Minutes  Minutes: 24          Date: 2020  Patient Name: Judson Finn,  Gender:  male        MRN: [id number]  : 1975  (53 y.o.)     Referring Practitioner: KAREN Rich CNP  Diagnosis: Seizures  Additional Pertinent Hx: Per ED note on 2/10/20, pt is a 53 y.o. male who presents to the Emergency Department for evaluation following several seizures. The patient was initially evaluated in [facility]. The patient has a history of TBI in in . Patient has a history of significant alcohol abuse over the past 32 years. Per wife, patient's last alcoholic beverage was 2 years ago. Wife reports patient having a total of 8 seizures at home this morning. Prior to the first seizure, the patient had walked outside, came back in, and then fell to the ground seizing. Patient is confused and disoriented in between episodes. Patient had an additional two seizures while in our department. Pt found to have lesions in the brain noted as malignant melanoma. Pt required intubation in ED and was extubated on . 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     Restrictions/Precautions:  Restrictions/Precautions: Fall Risk  Position Activity Restriction  Other position/activity restrictions: decreased cognition    SUBJECTIVE: pt sitting at nurses desk on arrival for therapy, pt is confused to situation and at times the place and time and even when redirected he gets upset at times he demonstrated poor safety awareness he is very impulsive  and poor recall of our conversation just min prior he can get agitated at times when being redirected but for most part was very pleasant during my session, pt was returned to room with chair alarm and wife at bedside     PAIN: 0/10:       OBJECTIVE:    Transfers:  Sit to Stand: Stand By Assistance  Stand to Sit:Stand By Assistance  He is impulsive and will sit w/o warning at times   Ambulation:  Stand By Assistance  Distance: 75x2   Surface: Level Tile  Device:No Device  Gait Deviations:  Pt moved quick and easily distracted in halls with min to mod path deviation     Balance:  pt compelted various balance activity he tossed a ball and caught it ~ 75% of time he stood in modified tandem stance iwth CGA  due to at times he was impulsive and had several near loss of balance an done loss of balance, pt standing on blue foam completed reaching activty with no UE at support pt reaching above head and to knee ht and right and left ~ 4 in out of base with CGA to min assist he tended to have loss of balance post frequently when reaching and looking up     Exercise:  Patient was guided in 1 set(s) 10 reps of exercise to both lower extremities. w/ one to telma UEs at support pt required many cues for proper technique as he frequently didn't listen to full instructions he completed heel raises, step ups forward, forward and lateral lunges, mini squats, hip abd/add . Exercises were completed for increased independence with functional mobility. Functional Outcome Measures: Completed  -PAC Inpatient Mobility without Stair Climbing Raw Score : 15  -PAC Inpatient without Stair Climbing T-Scale Score : 43.03    ASSESSMENT:  Assessment: Patient progressing toward established goals.  and pt very impulsive with

## 2020-02-17 NOTE — CONSULTS
Denies  ·    Trauma: Denies    Prior to Admission medications    Medication Sig Start Date End Date Taking? Authorizing Provider   acetaminophen (TYLENOL) 500 MG tablet Take 500 mg by mouth every 6 hours as needed. Historical Provider, MD        Medications:    Current Facility-Administered Medications: nicotine (NICOTROL) inhaler 1 puff, 1 puff, Inhalation, PRN  QUEtiapine (SEROQUEL) tablet 50 mg, 50 mg, Oral, BID  ziprasidone (GEODON) injection 15 mg, 15 mg, Intramuscular, Once **AND** sterile water injection 1.2 mL, 1.2 mL, Intramuscular, Once  nicotine (NICODERM CQ) 21 MG/24HR 1 patch, 1 patch, Transdermal, Daily  LORazepam (ATIVAN) tablet 0.5 mg, 0.5 mg, Oral, Q4H PRN  haloperidol lactate (HALDOL) injection 1 mg, 1 mg, Intramuscular, Q6H PRN  dexamethasone (DECADRON) tablet 4 mg, 4 mg, Oral, 3 times per day  sodium chloride flush 0.9 % injection 10 mL, 10 mL, Intravenous, 2 times per day  sodium chloride flush 0.9 % injection 10 mL, 10 mL, Intravenous, PRN  multivitamin 1 tablet, 1 tablet, Oral, Daily  lisinopril (PRINIVIL;ZESTRIL) tablet 10 mg, 10 mg, Oral, Daily  pantoprazole (PROTONIX) tablet 40 mg, 40 mg, Oral, BID AC  levETIRAcetam (KEPPRA) tablet 1,000 mg, 1,000 mg, Oral, BID  folic acid (FOLVITE) tablet 1 mg, 1 mg, Oral, Daily  vitamin B-1 (THIAMINE) tablet 100 mg, 100 mg, Oral, Daily  influenza quadrivalent split vaccine (FLUZONE;FLUARIX;FLULAVAL;AFLURIA) injection 0.5 mL, 0.5 mL, Intramuscular, Once  glucose (GLUTOSE) 40 % oral gel 15 g, 15 g, Oral, PRN  dextrose 50 % IV solution, 12.5 g, Intravenous, PRN  glucagon (rDNA) injection 1 mg, 1 mg, Intramuscular, PRN  dextrose 5 % solution, 100 mL/hr, Intravenous, PRN     Past Medical History:        Diagnosis Date    Seizures (HCC)     TBI (traumatic brain injury) ([facility] [address] 2009       Past Surgical History:        Procedure Laterality Date    CRANIOTOMY         Allergies: Patient has no known allergies.       Social History:     RESIDENCE: Lives in

## 2020-02-17 NOTE — TELEPHONE ENCOUNTER
.Transition of Care visit scheduled.   2/25/2020  Patient is being discharged to home  Date of discharge 2/17/2020 or 2/18/2020  [facility]  Reason for admission seizures

## 2020-02-17 NOTE — PROGRESS NOTES
Patient unable to lay on table for radiation treatment today. We did not give him treatment #3 of #10.    Snehal Salazar

## 2020-02-17 NOTE — DISCHARGE SUMMARY
Hospital Medicine Discharge Summary      Patient Identification:   Judson Finn   : 1975  MRN: [id number]   Account: [de-identified]      Patient's PCP: No primary care provider on file. Admit Date: 2/10/2020     Discharge Date:   20    Admitting Physician: Anjel Cannon MD     Discharge Physician: Gamaliel Thompson MD     Discharge Diagnoses: Active Hospital Problems    Diagnosis Date Noted    Acute respiratory failure ([facility] [address] [J96.00]     Seizures ([facility] [address] [R56.9] 02/10/2020       The patient was seen and examined on day of discharge and this discharge summary is in conjunction with any daily progress note from day of discharge. Hospital Course:   Per HPI:  \"Sal Costello is a 51yo M with PMHx of TBI and melanoma (s/p excision) who presents after apparent seizure like activity. He was initially sent to the ICU after intubation for airway protection. Imaging of the brain revealed multiple lesions with hemorrhagic component/edema consistent with a metastatic process - presumably from Melanoma that he did not receive treatment for. Oncology has been consulted, as has Rad Onc and he has been started on XRT on . Neurosurgery has no plans for operative intervention, and continue patient on decadron and Neurology is managing Keppra.       Very poor insight to condition, suspected underlying cognitive deficit. \"    Patient began radiation treatment to his brain while inpatient. He was placed on Decadron to alleviate the swelling associated with radiation. Patient had very impulsive behavior on the day of discharge he would get mildly agitated at times but never presented any harm to staff or to himself. 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 Reiterated multiple times that he can no longer work and can no longer drive.   His wife clarified multiple times that she could look after him 24 hours a day 7 days a week with the assistance of his parents who live next door. Seroquel was started for mood stabilization. Psychiatry was consulted and agreed with this. I personally discussed with Dr. Lorraine Rocha the patient's impulsive behavior. He also shared the same concern, however, noted that he is not presenting any immediate risk of harm to himself or to others and while there may be risk of future impulsive behavior we cannot predict if that will happen or not. Therefore, no indication for medical hold. Meanwhile, patient's wife stated multiple times that she believes that he will be much more easy to control/monitor while he is in a familiar environment at home. Given the above discussion/events I agree with the patient's wife that he would likely be less impulsive while at home, however, he absolutely requires 24-hour supervision. Home health was offered, however, the patient and wife declined. I advised the wife that should he exhibit any behavior that is of risk of harm to himself or others she should immediately call 911. Otherwise, medically patient will follow up with radiation oncology for continued treatments. Further Decadron taper will be managed by radiation oncology. He will further follow-up with oncology after he completes radiation. Exam:     Vitals:  Vitals:    02/17/20 0901 02/17/20 1123 02/17/20 1258 02/17/20 1514   BP: 132/82 128/79  131/78   Pulse: 75 79  84   Resp: 16 18  19   Temp: 97.2 °F (36.2 °C) 97.6 °F (36.4 °C)  97.7 °F (36.5 °C)   TempSrc: Oral Oral  Oral   SpO2: 97% 98% 98% 97%   Weight:       Height:         Weight: Weight: 196 lb 12.8 oz (89.3 kg)     24 hour intake/output:    Intake/Output Summary (Last 24 hours) at 2/17/2020 1700  Last data filed at 2/17/2020 1454  Gross per 24 hour   Intake 1690 ml   Output 0 ml   Net 1690 ml         General appearance: Sitting in chair agitated at times  HEENT:  Normal cephalic, atraumatic without obvious deformity.  Pupils equal, software. It may contain minor errors which are inherent in voice recognition technology. **      Final report electronically signed by Dr. Kristi Hooks on 2/12/2020 8:03 AM      CTA HEAD W WO CONTRAST   Final Result       1. 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 2. No stenosis of either carotid bulb. **This report has been created using voice recognition software. It may contain minor errors which are inherent in voice recognition technology. **      Final report electronically signed by Dr. Kristi Hooks on 2/12/2020 8:03 AM      CT HEAD WO CONTRAST   Final Result    Stable brain lesions. Some of which have associated hemorrhage. The surrounding edema is stable. There are no new areas of hemorrhage. **This report has been created using voice recognition software. It may contain minor errors which are inherent in voice recognition technology. **      Final report electronically signed by Dr. Kristi Hooks on 2/12/2020 7:39 AM      CT ABDOMEN PELVIS W WO CONTRAST Additional Contrast? Radiologist Recommendation   Final Result       1. Multiple pulmonary nodules in the right lung as evidence for metastatic disease. 2. Small bilateral pleural effusions with mild associated atelectasis. 3. No evidence of metastatic disease in the abdomen and pelvis. **This report has been created using voice recognition software. It may contain minor errors which are inherent in voice recognition technology. **      Final report electronically signed by Dr. Janeen Fallon MD on 2/11/2020 1:28 PM      CT CHEST W CONTRAST   Final Result       1. Multiple pulmonary nodules in the right lung as evidence for metastatic disease. 2. Small bilateral pleural effusions with mild associated atelectasis. 3. No evidence of metastatic disease in the abdomen and pelvis. **This report has been created using voice recognition software. It may contain minor errors which are inherent in voice recognition technology. **      Final report electronically signed by Dr. Carri Montgomery MD on 2/11/2020 1:28 PM      XR CHEST PORTABLE   Final Result   1. Interval placement of right subclavian central venous catheter with tip overlying the cavoatrial junction. No other acute cardiopulmonary findings are otherwise seen. There is slightly improved aeration of the lung bases when compared to prior    examination dated 2/10/2020. **This report has been created using voice recognition software. It may contain minor errors which are inherent in voice recognition technology. **      Final report electronically signed by Dr. Rosa Iniguez on 2/11/2020 10:17 AM      MRI Brain W WO Contrast   Final Result       1. Numerous enhancing masses in the brain. This is most compatible with metastatic disease. Some of these have associated internal hemorrhage. These range in size from punctate to 3.3 cm.   2. There is edema associated with the larger metastases. 3. Possible aneurysm of the right internal carotid artery. This could also represent tortuosity. A CTA or MRA of the head are recommended. 4. Opacified right maxillary sinus. There may be an associated obstructing right nasal polyp. **This report has been created using voice recognition software. It may contain minor errors which are inherent in voice recognition technology. **         Final report electronically signed by Dr. Tobias Sanchez on 2/11/2020 9:01 AM      XR CHEST PORTABLE   Final Result   1. Endotracheal tube and NG tube in good position. 2. Bibasilar atelectasis can't exclude infiltrate. **This report has been created using voice recognition software. It may contain minor errors which are inherent in voice recognition technology. **      Final report electronically signed by Dr. Giles Gabriel Mani Dorman MD on 2/10/2020 4:11 PM      CT HEAD WO CONTRAST   Final Result   Multiple mixed hyperdense/hemorrhagic lesions in the left temporoparietal lobes concerning for neoplasm. Largest in the temporal lobe is 3.1 x 2.3 with central hypoattenuation. Largest in the high parietal lobe is 3.2 x 2 cm and is uniformly hyperdense. Infection is thought to be less likely. Further evaluation with contrast-enhanced brain MRI is advised. No midline shift at this time however there is a fair amount of associated edema surrounding individual lesions. These results were communicated directly with Dagoberto Morrison [facility] on 2/10/2020 2:46 PM,  [ ]         **This report has been created using voice recognition software. It may contain minor errors which are inherent in voice recognition technology. **      Final report electronically signed by Dr. 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 This has been explicitly related to his wife as well  Diet: DIET GENERAL;  Dietary Nutrition Supplements: Low Calorie High Protein Supplement      Follow-up visits:   No follow-up provider specified. Discharge Medications:      Deyanira De Leon   Home Medication Instructions CRISTY:[id number]    Printed on:02/17/20 1700   Medication Information                      acetaminophen (TYLENOL) 500 MG tablet  Take 500 mg by mouth every 6 hours as needed.              dexamethasone (DECADRON) 4 MG tablet  Take 1 tablet by mouth every 8 hours for 14 days             divalproex (DEPAKOTE) 500 MG DR tablet  Take 1 tablet by mouth 2 times daily             folic acid (FOLVITE) 1 MG tablet  Take 1 tablet by mouth daily             lisinopril (PRINIVIL;ZESTRIL) 10 MG tablet  Take 1 tablet by mouth daily             Multiple Vitamin (MULTIVITAMIN) tablet  Take 1 tablet by mouth daily             pantoprazole (PROTONIX) 40 MG tablet  Take 1 tablet by mouth 2 times daily (before meals)             QUEtiapine (SEROQUEL) 50 MG tablet  Take 1 tablet by mouth 2 times daily             vitamin B-1 100 MG tablet  Take 1 tablet by mouth daily                 Time Spent on discharge is more than 30 minutes in the examination, evaluation, counseling and review of medications and discharge plan. Signed: Thank you No primary care provider on file. for the opportunity to be involved in this patient's care.     Electronically signed by Yaniv Mcpherson MD on 2/17/2020 at 5:00 PM

## 2020-02-17 NOTE — PROGRESS NOTES
Met at length with patient and wife. Dr. Hamida Olmos and oncology CNP Emre Lynne both present. Patient receiving radiation treatment #3 later today. Patient is very impulsive and wants to leave hospital.  He wants a \"pop and a pack of cigarettes\". He is confused and is unable to participate in conversation about his metastatic cancer, treatment, or prognosis. 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 There is no comprehension of his current situation. Wife at bedside and states this behavior is abnormal but believes he will be back to normal when he goes home and is hoping for discharge today. Concerns for patient and other's safety expressed and wife states patient will not drive, will not work with his saws, and does not own firearms and feels she will be able to keep him safe. She will be able to provide 24 hour care and has the help of his parents that live next door. Plan for radiation therapy later today. Primary RN Lian Borges updated. Palliative care will follow and assist as needed.

## 2020-02-17 NOTE — PROGRESS NOTES
file     Attends meetings of clubs or organizations: Not on file     Relationship status: Not on file    Intimate partner violence:     Fear of current or ex partner: Not on file     Emotionally abused: Not on file     Physically abused: Not on file     Forced sexual activity: Not on file   Other Topics Concern    Not on file   Social History Narrative    Not on file     Family History    History reviewed. No pertinent family history. ROS     Review of Systems   No ROS done. Vitals     height is 5' 10\" (1.778 m) and weight is 196 lb 12.8 oz (89.3 kg). His oral temperature is 97.2 °F (36.2 °C). His blood pressure is 132/82 and his pulse is 75. His respiration is 16 and oxygen saturation is 97%. O2 Flow Rate (L/min): 2 L/min    Exam   Physical Exam   No physical exam performed         Labs   CBC  No results for input(s): WBC, RBC, HGB, HCT, MCV, MCH, MCHC, RDW, PLT, MPV in the last 72 hours. BMP  No results for input(s): NA, K, CL, CO2, BUN, CREATININE, GLUCOSE, MG, PHOS, CALCIUM, IONCA, MG in the last 72 hours. LFT  No results for input(s): AST, ALT, ALB, BILITOT, ALKPHOS, LIPASE in the last 72 hours. Invalid input(s): AMYLASE  INR  No results for input(s): INR, PROTIME in the last 72 hours. PTT  No results for input(s): APTT in the last 72 hours. Radiology        Ct Head Wo Contrast    Result Date: 2/10/2020  PROCEDURE: CT HEAD WO CONTRAST CLINICAL INFORMATION: seizures; AMS. COMPARISON: February 15, 2013 TECHNIQUE: Noncontrast 5 mm axial images were obtained through the brain. All CT scans at this facility use dose modulation, iterative reconstruction, and/or weight-based dosing when appropriate to reduce radiation dose to as low as reasonably achievable. FINDINGS: Hypoattenuation/encephalomalacia right frontal lobes right greater than left. Ovoid hypoattenuating mass with well defined borders in the left temporal parietal lobe measuring 2.4 x 3.1 cm with surrounding edema.  Second 2 x 3.1 cm administration of intravenous contrast. FINDINGS: There are 14 enhancing masses in the brain. These are all intra-axial. Some of these are punctate. The 2 largest are in the left temporal lobe and paramedian left parietal lobe. The 3.0 x 2.4 cm left temporal lobe lesion has rim enhancement and central necrosis. There is some associated susceptibility artifact along its rim suggesting some blood products. The left parietal lobe lesion is oval in shape and measures 3.3 x 2.3 cm. This has susceptibility artifact within it and high central signal on the T1-weighted images. There is internal hemorrhage within this lesion. There are 2 smaller hemorrhagic lesions. These are in the superior left parietal lobe and the posterior left temporal lobe. There is a punctate hemorrhagic lesion in the periphery of the right cerebellum. On the postcontrast images, there are other scattered punctate foci of enhancement. Those other foci do not have associated hemorrhage. These mass lesions do not restrict diffusion. The brain volume is normal. There is no midline shift. There is no hydrocephalus. There is mass effect upon the atria of the left lateral ventricle. On the FLAIR and T2-weighted sequences, there is vasogenic edema surrounding the larger metastases. This is in the left parietal lobe and left temporal lobe. There are few smaller areas of edema. There is volume loss in the inferior frontal lobes bilaterally and the anterior right temporal lobe consistent with prior traumatic brain injury. That has been present on prior head CTs. On the gradient echo T2-weighted images, there is evidence of old subarachnoid hemorrhage in the right frontal lobe. This is along a prior ventriculostomy tract site. There is a prominent flow void near the expected location of the right posterior communicating artery. This could represent a tortuous internal carotid artery. An aneurysm is also a consideration.   The midline craniocervical junction structures are normal.  The brainstem and pituitary gland are normal. The right maxillary sinus is completely opacified. This has heterogeneous signal. There is a possible mass in the nasal cavity on the right which extends into the maxillary sinus. This could represent a polyp. 1. Numerous enhancing masses in the brain. This is most compatible with metastatic disease. Some of these have associated internal hemorrhage. These range in size from punctate to 3.3 cm. 2. There is edema associated with the larger metastases. 3. Possible aneurysm of the right internal carotid artery. This could also represent tortuosity. A CTA or MRA of the head are recommended. 4. Opacified right maxillary sinus. There may be an associated obstructing right nasal polyp. **This report has been created using voice recognition software. It may contain minor errors which are inherent in voice recognition technology. ** Final report electronically signed by Dr. Elieser Cruz on 2/11/2020 9:01 AM      Assessment/Recommendations    Mala Carey is a 53 y.o. male with PMH alcohol abuse, per family drank 3-4 cases of beer a week, DM, TBI s/p craniotomy and melanoma excision of L upper back In February 2012, he had a melanoma removed from his left back and was instructed to follow up but he did not. He  was admitted for seizures. He was intubated due to inability to protect airway. CT Head and MRI Brain showed numerous enhancing masses in the brain. This is most compatible with metastatic disease from his melanoma. Some of these have associated internal hemorrhage. These range in size from punctate to 3.3 cm with the largest in the left parietal lobe with surrounding edema. CT Chest showed multiple pulmonary nodules in the right lung as evidence for metastatic disease. No metastasis was noted on CTAP.         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 He was admitted for  seizures and MRI Brain showed numerous enhancing masses in the brain. This is most compatible with metastatic disease. Some of these have associated internal hemorrhage. These range in size from punctate to 3.3 cm with the largest in the left parietal lobe with surrounding edema. CT chest showed multiple pulmonary nodules in the R lung. CTAP was negative for metastasis. - Neurosurgery consulted - did not recommend surgery. No need to biopsy brain lesions.    - Radiation Oncology consulted- plan to start RT when patient is stable  - will follow up with Dr. 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 Sofiya Bay after RT completed (or sooner if needed) to discuss prognosis and systemic treatment options. Case discussed with nurse and patient/family. Questions and concerns addressed.   Plan made in collaboration with Dr. Sofiya Bay    Electronically signed by   KAREN Monaco NP on 2/17/2020 at 9:51 AM

## 2020-02-18 NOTE — CARE COORDINATION
2/18/20, 7:23 AM    Patient goals/plan/ treatment preferences discussed by  and . Patient goals/plan/ treatment preferences reviewed with patient/ family. Patient/ family verbalize understanding of discharge plan and are in agreement with goal/plan/treatment preferences. Understanding was demonstrated using the teach back method. AVS provided by RN at time of discharge, which includes all necessary medical information pertaining to the patients current course of illness, treatment, post-discharge goals of care, and treatment preferences. Pt discharged to home with wife. Set up with Transition of Care/Residency Group Dr. Leonor Rainey. For post hospital follow up. Denies any other needs or services.

## 2020-02-18 NOTE — TELEPHONE ENCOUNTER
[facility] Initial Follow Up Call    Outreach made within 2 business days of discharge: Yes    Patient: Kong Pedraza Patient : 1975   MRN: [id number]  Reason for Admission: There are no discharge diagnoses documented for the most recent discharge. Discharge Date: 20       Spoke with: called [phone], left vm     Discharge department/facility:     [facility] Interactive Patient Contact:  Was patient able to fill all prescriptions:   Was patient instructed to bring all medications to the follow-up visit:   Is patient taking all medications as directed in the discharge summary?    Does patient understand their discharge instructions:   Does patient have questions or concerns that need addressed prior to 7-14 day follow up office visit:     Scheduled appointment with PCP within 7-14 days    Follow Up  Future Appointments   Date Time Provider Aristides Mota   2020 11:15 AM SCHEDULE, [address] [facility] [place]   2020 11:00 AM SCHEDULE, [address] [facility] [place]   2020 11:00 AM SCHEDULE, [address] [facility] [place]   2020 11:00 AM SCHEDULE, [address] [facility] [place]   2020 11:00 AM SCHEDULE, [address] [facility] [place]   2020  8:50 AM Ana Luisa Small MD [facility] [place] [address]   2020 11:00 AM SCHEDULE, [address] [facility] [place]   2020 11:00 AM SCHEDULE, [address] [facility] [place]   2020 11:00 AM SCHEDULE, [address] [facility] [place], LPN

## 2020-02-19 NOTE — TELEPHONE ENCOUNTER
[facility] Initial Follow Up Call    Outreach made within 2 business days of discharge: Yes    Patient: Deepa Souza Patient : 1975   MRN: [id number]  Reason for Admission: There are no discharge diagnoses documented for the most recent discharge. Discharge Date: 20       Spoke with: called [phone], left vm     Discharge department/facility:     [facility] Interactive Patient Contact:  Was patient able to fill all prescriptions:   Was patient instructed to bring all medications to the follow-up visit:   Is patient taking all medications as directed in the discharge summary?    Does patient understand their discharge instructions:   Does patient have questions or concerns that need addressed prior to 7-14 day follow up office visit:     Scheduled appointment with PCP within 7-14 days    Follow Up  Future Appointments   Date Time Provider Aristides Mota   2020  1:00 PM SCHEDULE, [address] [address]   2020 11:00 AM SCHEDULE, [address]   2020 11:00 AM SCHEDULE, [address]   2020 11:00 AM SCHEDULE, [address]   2020  8:50 AM [facility]  [place]   2020 11:00 AM SCHEDULE, [address]   2020 11:00 AM SCHEDULE, [address]   2020 11:00 AM SCHEDULE, [address] [place], LPN

## 2020-02-29 NOTE — PROGRESS NOTES
[facility] [address] [facility] [address]  Ph. [phone]  Fax [phone]    PATIENT: Fredrick Perera  : 1975  MRN: [id number]  DATE: 20      DIAGNOSIS: C79.31 -- Secondary malignant neoplasm of brain, related to:  C43.59 -- Malignant melanoma of the of other part of trunk (upper back); melanoma, nodular type; pT4b cN0 at diagnosis,, now M1d Stage IV. Treatment Course Number: 1    Treatment Site (s) Modality Dose (cGy) From To Fractions/  Elapsed Days   [place] Brain 6MV photons 3000 cGy 2020 2020 10/ 14     Cumulative Dose to Whole Brain: 3000 cGy  Treatment Modifiers: Complex Isodose Planning; Custom MLC Blocking; Custom Aquaplast Mask Immobilization. HISTORY OF PRESENT ILLNESS:   Kenyatta Murphy is a 49-year-old male who was diagnosed with a pigmented lesion on his upper back in . He underwent excision in 2018. Final pathology showed a pT4b Jimbo's Level IV lesion. He was scheduled for an appointment with a surgical oncologist at [facility] for further evaluation including wide excision and sentinel lymph node biopsy. The patient did not attend several scheduled appointments at [facility]. Eventually, he was seen there in 2019, and received a recommendation for wide excision and sentinel lymph node biopsy. The patient did not follow-through with the recommended surgery, nor has he had any documented oncologic follow-up since 2019. Yesterday, the patient was brought to the emergency department by his wife after he began suffering seizures. At the time he was evaluated in the emergency department, he had experienced multiple seizures, initially affecting the right side, but then he coming generalized.   Images were obtained of the brain including CT scan of the head without contrast.  This showed multiple mixed

## 2020-03-03 NOTE — TELEPHONE ENCOUNTER
Received phone call from [address] stating that electricity is about to be shut off. [email or web address] application was mailed 4/99/94, but can take 6 weeks to process. Told Miryam to call [facility] at [phone] to see if there is anything they can do to stop the power from being turned off. She voiced understanding and will call for assistance. Also gave her the phone number for [email or web address] to call and check on application [phone].

## 2020-03-11 PROBLEM — J06.9 UPPER RESPIRATORY INFECTION: Status: ACTIVE | Noted: 2020-01-01

## 2020-03-11 PROBLEM — F10.11 HISTORY OF ALCOHOL ABUSE: Status: ACTIVE | Noted: 2020-01-01

## 2020-03-11 PROBLEM — C79.31 SECONDARY MALIGNANT NEOPLASM OF BRAIN (HCC): Status: ACTIVE | Noted: 2020-01-01

## 2020-03-11 PROBLEM — Z86.79 HISTORY OF CEREBRAL HEMORRHAGE: Status: ACTIVE | Noted: 2020-01-01

## 2020-03-11 PROBLEM — C43.59 MALIGNANT MELANOMA OF UPPER BACK (HCC): Status: ACTIVE | Noted: 2019-02-07

## 2020-03-11 NOTE — PROGRESS NOTES
S: 50 y.o. male with   Chief Complaint   Patient presents with   [address] from [place] [address] Patient to establish [facility] Follow-up d/c 02/17/2020 Seizures and Brain Neoplasm and patient states that he had 3 seizures yesterday, and memory issues    Discuss Medications     due to Medicaid Depakote DR 500mg is not covered but Depakote ER 500mg will be per patient    Nasal Congestion     blowing out and coughing up green mucus started about 1-2 weeks ago    Rectal Bleeding     every time trying to have a bowel movement having only blood for 2 weeks abdomen hard and swollen       HPI: stage 4 melanoma recently dxed. Just finished radiation x 10 treatments. F/u with oncology tomorrow. Brain mets causing seizures. 3 seizures yesterday. Taking depakote 500mg bid. abd firmness intermittently. + rectal bleeding. Nasal congestion x 1-2 wks. Immunocompromised with radiation. BP Readings from Last 3 Encounters:   03/12/20 (!) 157/89   03/11/20 130/82   02/27/20 134/86     Wt Readings from Last 3 Encounters:   03/12/20 204 lb (92.5 kg)   03/11/20 201 lb 12.8 oz (91.5 kg)   02/27/20 201 lb 8 oz (91.4 kg)           O: VS:  height is 5' 10\" (1.778 m) and weight is 201 lb 12.8 oz (91.5 kg). His oral temperature is 97.9 °F (36.6 °C). His blood pressure is 130/82 and his pulse is 127. His respiration is 16 and oxygen saturation is 98%. AAO/NAD, appropriate affect for mood  CV:  RRR, no murmur  Resp: CTAB       Diagnosis Orders   1. Secondary malignant neoplasm of brain ([address]  divalproex (DEPAKOTE ER) 250 MG extended release tablet    Valproic Acid Level, Total    Pili Burrell MD, Neurology, [place]   2. Seizures (HCC)  divalproex (DEPAKOTE ER) 250 MG extended release tablet    Valproic Acid Level, Total    Pili Burrell MD, Neurology, [place] [address]   3. Upper respiratory tract infection, unspecified type  amoxicillin-clavulanate (AUGMENTIN) 875-125 MG per tablet   4.  Rectal bleed Plan:  Increase depakote to 750mg bid. Check valproic acid level. augmentin 875/125mg bid x 10 days. Neuro referral.     Health Maintenance Due   Topic Date Due    Pneumococcal 0-64 years Vaccine (1 of 3 - PCV13) 03/10/1979    HIV screen  03/10/1988    DTaP/Tdap/Td vaccine (1 - Tdap) 03/10/1992    Lipid screen  03/10/2013    Flu vaccine (1) 09/01/2019         Attending Physician Statement  I have discussed the case, including pertinent history and exam findings with the resident. I also have seen the patient and performed key portions of the examination. I agree with the documented assessment and plan as documented by the resident.   GE modifier added to this encounter      Ta Iqbal DO 3/31/2020 11:47 AM

## 2020-03-11 NOTE — PATIENT INSTRUCTIONS
Thank you   1. Thank you for trusting us with your healthcare needs. You may receive a survey regarding today's visit. It would help us out if you would take a few moments to provide your feedback. We value your input. 2. Please bring in ALL medications BOTTLES, including inhalers, herbal supplements, over the counter, prescribed & non-prescribed medicine. The office would like actual medication bottles and a list.   3. Please note our OFFICE POLICIES:   a. Prior to getting your labs drawn, please check with your insurance company for benefits and eligibility of lab services. Often, insurance companies cover certain tests for preventative visits only. It is patient's responsibility to see what is covered. b. We are unable to change a diagnosis after the test has been performed. c. Lab orders will not be re-printed. Please hold onto your original lab orders and take them to your lab to be completed. d. If you no show your scheduled appointment three times, you will be dismissed from this practice. e. Nichol Gent must be completed 24 hours prior to your schedule appointment. 4. If the list below has been completed, PLEASE FAX RECORDS TO OUR OFFICE @ [phone].  Once the records have been received we will update your records at our office:  Health Maintenance Due   Topic Date Due    Pneumococcal 0-64 years Vaccine (1 of 3 - PCV13) 03/10/1979    HIV screen  03/10/1988    DTaP/Tdap/Td vaccine (1 - Tdap) 03/10/1992    Lipid screen  03/10/2013    Flu vaccine (1) 09/01/2019

## 2020-03-11 NOTE — PROGRESS NOTES
Reema Martines is a 50 y.o. male who presents today for:  Chief Complaint   Patient presents with    Follow-Up from [place] [id number] Patient to establish [facility] Follow-up d/c 02/17/2020 Seizures and Brain Neoplasm and patient states that he had 3 seizures yesterday, and memory issues    Discuss Medications     due to Medicaid Depakote DR 500mg is not covered but Depakote ER 500mg will be per patient    Nasal Congestion     blowing out and coughing up green mucus started about 1-2 weeks ago    Rectal Bleeding     every time trying to have a bowel movement having only blood for 2 weeks abdomen hard and swollen       Goals    None         HPI:     Rectal Bleeding    The current episode started more than 1 week ago. The onset was sudden. The problem occurs continuously. The problem has been gradually worsening. The patient is experiencing no pain. The stool is described as mixed with blood and bloody. There was no prior successful therapy. There was no prior unsuccessful therapy. Associated symptoms include hemorrhoids and coughing (green mucous). Pertinent negatives include no fever, no abdominal pain, no diarrhea, no hematemesis, no nausea, no rectal pain, no vomiting, no chest pain and no headaches. URI    This is a new problem. The current episode started 1 to 4 weeks ago (2 weeks). The problem has been gradually worsening. There has been no fever. Associated symptoms include congestion, coughing (green mucous) and swollen glands. Pertinent negatives include no abdominal pain, chest pain, diarrhea, dysuria, headaches, nausea, neck pain, rhinorrhea, sore throat or vomiting. He has tried decongestant for the symptoms. The treatment provided no relief. Has malignant melanoma with mets to brain. Completed 10 radiation treatments. Sees Dr. Ian Mcgowan tomorrow. 3 Seizures yesterday morning. 2 at 4:30 in the morning and one getting into the truck on the way out of the house.     Family history of colon cancer Potassium monitoring  02/17/2021    Creatinine monitoring  02/17/2021    Shingles Vaccine (1 of 2) 03/10/2023    Hepatitis A vaccine  Aged Out    Hepatitis B vaccine  Aged Out    Hib vaccine  Aged Out    Meningococcal (ACWY) vaccine  Aged Out       Subjective:      Review of Systems   Constitutional: Negative for appetite change, chills, diaphoresis and fever. HENT: Positive for congestion. Negative for rhinorrhea and sore throat. Respiratory: Positive for cough (green mucous). Negative for shortness of breath. Cardiovascular: Negative for chest pain and palpitations. Gastrointestinal: Positive for hematochezia and hemorrhoids. Negative for abdominal pain, diarrhea, hematemesis, nausea, rectal pain and vomiting. Genitourinary: Negative for difficulty urinating and dysuria. Musculoskeletal: Negative for back pain and neck pain. Skin: Positive for color change and wound. Neurological: Positive for seizures and light-headedness. Negative for dizziness, weakness, numbness and headaches. Psychiatric/Behavioral: Positive for confusion, decreased concentration and dysphoric mood. Negative for behavioral problems and sleep disturbance. The patient is nervous/anxious. Objective:     Vitals:    03/11/20 1511   BP: 130/82   Site: Right Upper Arm   Position: Sitting   Cuff Size: Large Adult   Pulse: 127   Resp: 16   Temp: 97.9 °F (36.6 °C)   TempSrc: Oral   SpO2: 98%   Weight: 201 lb 12.8 oz (91.5 kg)   Height: 5' 10\" (1.778 m)       Body mass index is 28.96 kg/m². Wt Readings from Last 3 Encounters:   03/11/20 201 lb 12.8 oz (91.5 kg)   02/27/20 201 lb 8 oz (91.4 kg)   02/20/20 207 lb 3.7 oz (94 kg)     BP Readings from Last 3 Encounters:   03/11/20 130/82   02/27/20 134/86   02/20/20 (!) 164/79       Physical Exam  Vitals signs and nursing note reviewed. Constitutional:       General: He is not in acute distress. Appearance: He is normal weight. He is ill-appearing.    HENT: Head: Normocephalic. Comments: Hair loss  Face is very erythematous      Right Ear: External ear normal.      Left Ear: External ear normal.      Nose: Nose normal. No congestion or rhinorrhea. Mouth/Throat:      Mouth: Mucous membranes are moist.      Pharynx: Oropharynx is clear. No oropharyngeal exudate or posterior oropharyngeal erythema. Eyes:      General:         Right eye: No discharge. Left eye: No discharge. Conjunctiva/sclera: Conjunctivae normal.      Pupils: Pupils are equal, round, and reactive to light. Neck:      Musculoskeletal: Neck supple. Cardiovascular:      Rate and Rhythm: Regular rhythm. Tachycardia present. Pulses: Normal pulses. Heart sounds: Normal heart sounds. No murmur. Pulmonary:      Effort: Pulmonary effort is normal. No respiratory distress. Breath sounds: Normal breath sounds. No wheezing, rhonchi or rales. Abdominal:      General: Bowel sounds are normal. There is no distension (firm). Tenderness: There is no abdominal tenderness. Musculoskeletal:      Right lower leg: No edema. Left lower leg: No edema. Skin:     General: Skin is warm and dry. Neurological:      Mental Status: He is alert. Mental status is at baseline. He is confused. Psychiatric:         Speech: Speech normal.         Behavior: Behavior is not agitated or aggressive. Behavior is cooperative. Cognition and Memory: Cognition is impaired. Memory is impaired.            Lab Results   Component Value Date    WBC 15.0 (H) 02/17/2020    HGB 15.6 02/17/2020    HCT 44.4 02/17/2020     02/17/2020    AST 27 02/14/2020     02/17/2020    K 4.5 02/17/2020     02/17/2020    CREATININE 0.8 02/17/2020    BUN 17 02/17/2020    CO2 20 (L) 02/17/2020    INR 1.03 02/10/2020    LABA1C 6.0 02/11/2020    LABGLOM >90 02/17/2020    CALCIUM 9.7 02/17/2020       Imaging Results:    Ct Abdomen Pelvis W Wo Contrast Additional Contrast? Radiologist Multiple pulmonary nodules in the right lung as evidence for metastatic disease. 2. Small bilateral pleural effusions with mild associated atelectasis. 3. No evidence of metastatic disease in the abdomen and pelvis. **This report has been created using voice recognition software. It may contain minor errors which are inherent in voice recognition technology. ** Final report electronically signed by Dr. Tiffany Crabtree MD on 2/11/2020 1:28 PM    Cta Head W Wo Contrast    Result Date: 2/12/2020  PROCEDURE: CTA HEAD W WO CONTRAST, CTA NECK W WO CONTRAST CLINICAL INFORMATION: Possible aneurysm of the right internal carotid artery. History of melanoma. Brain lesions. COMPARISON: Head CT 2/12/2020. TECHNIQUE: 1 mm axial images were obtained through the head and neck after the fast bolus administration of contrast. A noncontrast localizer was obtained. 3-D reconstructions were performed on a dedicated 3-D workstation. These include multiplanar MPR images and multiplanar MIP images. Centerline reconstructions were obtained of the carotid systems. Isovue intravenous contrast was given. All CT scans at this facility use dose modulation, iterative reconstruction, and/or weight-based dosing when appropriate to reduce radiation dose to as low as reasonably achievable. FINDINGS: CTA NECK: Aortic arch and branches: There is some minimal atherosclerosis of the aortic arch. There is no stenosis at the origin of the innominate artery, left common carotid artery or either subclavian artery. Right common carotid artery/ICA: The right common carotid artery is normal. The right carotid bulb is normal. The right internal carotid artery is normal. There is no stenosis. Left common carotid artery/ICA: The left internal carotid artery is normal. There is a minimal area of soft plaque in the left carotid bulb. There is no stenosis. There is no stenosis of the left internal carotid artery. Vertebral arteries:  The right vertebral artery is overlying the mid trachea 4.6 cm above the ayala. The heart size is normal. No focal consolidation, pleural effusion or pneumothorax is seen. No acute osseous findings are demonstrated. 1. Interval placement of right subclavian central venous catheter with tip overlying the cavoatrial junction. No other acute cardiopulmonary findings are otherwise seen. There is slightly improved aeration of the lung bases when compared to prior examination dated 2/10/2020. **This report has been created using voice recognition software. It may contain minor errors which are inherent in voice recognition technology. ** Final report electronically signed by Dr. Roland Kingston on 2/11/2020 10:17 AM    Mri Brain W Wo Contrast    Result Date: 2/11/2020  PROCEDURE: MRI BRAIN W R [address] INFORMATIONEvaluate hemorrhagic hyperdense masses seen on CT scan; seizures. COMPARISON: Head CT 2/10/2020. Head CT 1/15/2013. TECHNIQUE: Multiplanar and multiple spin echo T1 and T2-weighted images were obtained through the brain before and after the administration of intravenous contrast. FINDINGS: There are 14 enhancing masses in the brain. These are all intra-axial. Some of these are punctate. The 2 largest are in the left temporal lobe and paramedian left parietal lobe. The 3.0 x 2.4 cm left temporal lobe lesion has rim enhancement and central necrosis. There is some associated susceptibility artifact along its rim suggesting some blood products. The left parietal lobe lesion is oval in shape and measures 3.3 x 2.3 cm. This has susceptibility artifact within it and high central signal on the T1-weighted images. There is internal hemorrhage within this lesion. There are 2 smaller hemorrhagic lesions. These are in the superior left parietal lobe and the posterior left temporal lobe. There is a punctate hemorrhagic lesion in the periphery of the right cerebellum.  On the postcontrast images, there are other scattered punctate foci of enhancement. Those other foci do not have associated hemorrhage. These mass lesions do not restrict diffusion. The brain volume is normal. There is no midline shift. There is no hydrocephalus. There is mass effect upon the atria of the left lateral ventricle. On the FLAIR and T2-weighted sequences, there is vasogenic edema surrounding the larger metastases. This is in the left parietal lobe and left temporal lobe. There are few smaller areas of edema. There is volume loss in the inferior frontal lobes bilaterally and the anterior right temporal lobe consistent with prior traumatic brain injury. That has been present on prior head CTs. On the gradient echo T2-weighted images, there is evidence of old subarachnoid hemorrhage in the right frontal lobe. This is along a prior ventriculostomy tract site. There is a prominent flow void near the expected location of the right posterior communicating artery. This could represent a tortuous internal carotid artery. An aneurysm is also a consideration. The midline craniocervical junction structures are normal.  The brainstem and pituitary gland are normal. The right maxillary sinus is completely opacified. This has heterogeneous signal. There is a possible mass in the nasal cavity on the right which extends into the maxillary sinus. This could represent a polyp. 1. Numerous enhancing masses in the brain. This is most compatible with metastatic disease. Some of these have associated internal hemorrhage. These range in size from punctate to 3.3 cm. 2. There is edema associated with the larger metastases. 3. Possible aneurysm of the right internal carotid artery. This could also represent tortuosity. A CTA or MRA of the head are recommended. 4. Opacified right maxillary sinus. There may be an associated obstructing right nasal polyp. **This report has been created using voice recognition software.  It may contain minor errors which are inherent in voice recognition technology. ** Final report electronically signed by Dr. Zuri Rod on 2/11/2020 9:01 AM    Ct Guide Radiation Therapy No Charge    Result Date: 2/13/2020  Radiology exam is complete. No Radiologist dictation. Please follow up with ordering provider. Assessment / Plan:     1. Secondary malignant neoplasm of brain [facility])- Sees Dr. Jean Carlos Durant tomorrow. Advised patient to discuss goals of care with Dr. Jean Carlos Durant at appointment tomorrow. Consider outpt palliative care when it comes online.  - divalproex (DEPAKOTE ER) 250 MG extended release tablet; Take 3 tablets by mouth 2 times daily  Dispense: 180 tablet; Refill: 3  - Valproic Acid Level, Total; Future  - Falguni Martin MD, Neurology, [address] [place] [address]    2. Seizures ([address]- 2/2 mets to brain. Will increase depakote, get level and refer to neurology for further recommendations   - divalproex (DEPAKOTE ER) 250 MG extended release tablet; Take 3 tablets by mouth 2 times daily  Dispense: 180 tablet; Refill: 3  - Valproic Acid Level, Total; Future  - Falguni Martin MD, Neurology, [address] [place] [address]    3. Upper respiratory tract infection, unspecified type- Will start Augmentin for 10 days. - amoxicillin-clavulanate (AUGMENTIN) 875-125 MG per tablet; Take 1 tablet by mouth 2 times daily for 10 days  Dispense: 20 tablet; Refill: 0    4. Rectal Bleed  - Patient has reported that the bleeding is slowly getting worse. He does have a history of hemorrhoids. Will address at next appointment and further work up will depend on goal of care established with Dr. Jean Carlos Durant         Return in about 2 weeks (around 3/25/2020) for f/u URI.       Medications Prescribed:  Orders Placed This Encounter   Medications    divalproex (DEPAKOTE ER) 250 MG extended release tablet     Sig: Take 3 tablets by mouth 2 times daily     Dispense:  180 tablet     Refill:  3    Multiple Vitamin (MULTIVITAMIN) tablet     Sig: Take 1 tablet by mouth daily     Dispense:  30 tablet     Refill:  0    amoxicillin-clavulanate (AUGMENTIN) 875-125 MG per tablet     Sig: Take 1 tablet by mouth 2 times daily for 10 days     Dispense:  20 tablet     Refill:  0       Future Appointments   Date Time Provider Aristides Svetlana   3/12/2020 10:30 AM STR OUT PT ONC CMA STRZ OP ONC Sam HOD   3/12/2020 10:45 AM Fabiano Vidales MD Oncology [facility]   4/3/2020 10:15 AM Priscilla Nowak, APRN - CNP radha [address] 50 HOD       Patient given educational materials - see patient instructions. Discussed use, benefit, and sideeffects of prescribed medications. All patient questions answered. Pt voiced understanding. Reviewed health maintenance. Instructed to continue current medications, diet and exercise. Patient agreed with treatment plan. Follow up as directed.      Electronically signed by Baljeet Mccann MD on 3/11/2020 at 5:49 PM

## 2020-03-12 NOTE — PROGRESS NOTES
Name: Janna Carvalho  : 1975  MRN: [id number]    Oncology Navigation- Initial Note:    Intake-  Contact Type: Medical Oncology    Diagnosis: Metastatic Melanoma    Original DX in 2012-melanoma removed from his left back and was instructed to follow up but he did not. Presented to ER 2020 with seizures. Home Disposition: Lives with other who is able to assist   Pt is a  & has filed for disability. [facility] payor source    Independent in self care  Denies headaches  +Seizures when not compliant with depakote  Admits to Driving; spouse has had her license revoked. Orlin recommended pt spouse seek court approval for driving privileges to transport pt to his medical appts. Patient SHOULD NOT BE DRIVING. XRT 10 fxs to brain completed  Currently on Augmentin for URI. Admits to bloody stool-> PCP recommending colonoscopy. -Pt spouse reports pt having some confusion     Patient needs and barriers to care: Coordination of Care, Knowledge deficit, Low Health Literacy, Symptom Management and Transportation     Referral Source: Outpatient    Receptive to Advanced Care Planning/ Palliative Care:  Appropriate, but deferred today    Interventions-   General Interventions: Orlin program discussed; welcome folder provided. ONC met with pt & spouse. Discussed pt current status, being stage IV cancer, with no option for cure. Pt & spouse voiced understanding, but repeatedly asked if the radiation has shrunk the brain lesions. ONC shared that the metastatic melanoma \"will likely take pt's life. \"    ONC POC:   -MRI  Brain   -Return to Med Onc on  to review MRI results & discuss next steps. ONC has shared pt may be be a candidate for chemotherapy IF the brain lesions responded to the XRT. POC is pending the MRI results.       Education/Screenings:  yes - POC discussed, reviewed & questions answered     Referrals: Supportive Therapies  Discussed Dietary, Onc Rehab, SS, & Financial Navigator services being available to pt if needed. Continuum of Care: Palliative Care    Notes: Pt receptive to Orlin following his care to assist & support as needed.       Electronically signed by Trae Hernandez RN on 3/12/2020 at 4:16 PM

## 2020-03-23 NOTE — PROGRESS NOTES
Seizures. Plan:   I had a consultation with the patient and his wife today. We reviewed his current status of his malignancy with a follow-up plan of care. I did discuss with the patient and his wife the role of systemic therapy with Opdivo. However, we did discuss that the most important aspect of his disease at this time is to control central nervous system malignancy. The patient continues to have mild to moderate confusion. We will obtain an MRI of the brain prior to his next clinic visit to evaluate the status of the central nervous system treatment after completing the course of radiation therapy. If the patient has had a good response to the central nervous system metastasis ablation therapy treatment then we may consider systemic therapy with Opdivo. Multiple questions were answered throughout the course the consultation. The patient and his wife were asked to call if is any questions or concerns prior to his next scheduled clinic visit. The patient has not had any recent seizures. He will continue his current antiseizure medication without change. Matt Adorno M.D. Medical Director: [facility]  Cancer [address] [facility], [address] [place] [address] of the [facility]      **This report has been created using voice recognition software. It may contain minor errors which are inherent in voice recognition technology. **

## 2020-03-23 NOTE — TELEPHONE ENCOUNTER
Navigation:     Orlin phoned Holly Syed CMA at [facility] as directed by ALESHIA Guzmán to update him on pt's spouse phone call. Sarahy Montgomery will communicate information to Dr. Diana Coates. Orlin requested they phone pt's spouse with an update. Sarahy Montgomery voiced understanding.

## 2020-03-23 NOTE — TELEPHONE ENCOUNTER
Navigation:     Orlin received phone call from pt spouse verbalizing, George Salcedo is having severe headache pain, which is bringing him to tears; we need RX to help the pain. \"  Spouse reports, tylenol has not helped. Cont on anti seizure medication. \"Not on any steroid anymore. \"   Requests RX be phoned in to [facility] on [facility]. Orlin phoned [facility]- communicated the above information on VM. Message also sent to Dr. Jeovanny Cardoza via [facility] as a follow up to the vm message that was left. Yamilka Fraire

## 2020-04-08 NOTE — PROGRESS NOTES
03/12/2020    MCHC 33.8 03/12/2020    RDW 12.1 03/12/2020     03/12/2020    MPV 7.3 03/12/2020     CMP:  Lab Results   Component Value Date     03/12/2020    K 4.6 03/12/2020    CO2 25 03/12/2020    BUN 22 03/12/2020    CREATININE 0.8 03/12/2020    PROT 7.4 03/12/2020    LABALBU 4.1 03/12/2020    BILITOT 0.2 03/12/2020    ALKPHOS 55 03/12/2020    AST 19 03/12/2020    ALT 57 03/12/2020       RADIOLOGY RESULTS:   4/2/2020: MRI Brain w/wo contrast:   Impression       1. Enlarging metastases in the left parietal lobe. There is also worsening surrounding edema. 2. New midline shift to the right. 3. Some smaller metastases are stable. Others have slightly increased in size. MEDICATIONS:   Current Outpatient Medications   Medication Sig Dispense Refill    dexamethasone (DECADRON) 4 MG tablet Take 1 tablet by mouth every 6 hours 120 tablet 0    pantoprazole (PROTONIX) 40 MG tablet Take 40 mg by mouth daily      vitamin B-1 (THIAMINE) 100 MG tablet Take 100 mg by mouth daily      divalproex (DEPAKOTE ER) 250 MG extended release tablet Take 3 tablets by mouth 2 times daily 180 tablet 3    Multiple Vitamin (MULTIVITAMIN) tablet Take 1 tablet by mouth daily 30 tablet 0    QUEtiapine (SEROQUEL) 50 MG tablet Take 1 tablet by mouth 2 times daily 60 tablet 3    lisinopril (PRINIVIL;ZESTRIL) 10 MG tablet Take 1 tablet by mouth daily 30 tablet 3    folic acid (FOLVITE) 1 MG tablet Take 1 tablet by mouth daily 30 tablet 3     No current facility-administered medications for this encounter. ROS: As noted in the HPI and Interval history, otherwise negative. EXAMINATION:   GENERAL: Lon Mott is a pleasant well developed adult male. He is alert and oriented x3 in no acute distress. VITAL SIGNS:  Weight 88.3kg, Temperature 35.9 celsius, Pulse 78, /86, Respirations 16, Pulse ox 98% room air. HEENT: Normocephalic, atraumatic. Alopecia. Skin on scalp dry and intact. PERRL.  EOMI but 2. Discuss with physics SRS plan to the 2 parietal lobe lesions which have increased in size after whole brain radiation. 3. Updated Dr. Sonny Cerna on radiation plan for [facility]. He is seeing him tomorrow in follow up. 4. Will call [facility] to schedule SRS planing once physics and Dr. Brittani Duncan have reviewed the case. 5. Continue follow up with other providers as scheduled. 6. Would benefit from a walker to steady his ambulation as well as a wheelchair to be used for longer distances. David Barker was evaluated today and a DME order was entered for a wheeled walker because he requires this to successfully complete daily living tasks of toileting, ambulating, grooming. The walker will benefit him for shorter distances to maintain safety during those activities. A wheeled walker is necessary due to the patient's unsteady gait, upper body weakness, and inability to  an ambulation device; and he can ambulate only by pushing a walker instead of a lesser assistive device such as a cane, crutch, or standard walker. The need for this equipment was discussed with the patient and he understands and is in agreement. David Barker was evaluated today and a DME order was entered for a standard wheelchair because he requires this to successfully complete daily living tasks of ambulating and meal preparation, this would also be beneficial for him when going longer distances and a walker will not be sufficient for him to maintain safety. A standard manual wheelchair is necessary due to patient's impaired ambulation and mobility restrictions and would be unable to resolve these daily living tasks using a cane or walker. The patient is capable of using a standard wheelchair safely in their home and can maneuver within their home with adequate access. There is a caregiver available to provide necessary assistance.   The need for this equipment was discussed with the patient and he understands, is in agreement, and has not expressed an unwillingness to use the wheelchair.       Electronically signed by KAREN Franz CNP on 4/8/20 at 11:01 AM EDT    CC: Dr. Tommy Cooper, Dr. Mario Woodard, Dr. Trini Bach, Dr. Katarina Allred

## 2020-04-15 NOTE — LETTER
[address] [facility]  [facility]. [address] [facility] [address]  Phone: [phone]  Fax: [phone]    Tayler Wolf*        April 15, 2020       Patient: Xiomara Duarte   MR Number: [id number]   YOB: 1975   Date of Visit: 4/15/2020       Dear Dr. Collado Car: Thank you for the request for consultation for Boubacar Sheppard to me for the evaluation of his brain metastatic disease. Below are the relevant portions of my assessment and plan of care. Ralf Mccabe presented today with his wife for a follow-up visit per a request of his radiation oncologist Dr. Karis Sexton      This is a 47 years old Male  who has a history of melanoma resected in 2012 but unfortunately patient lost the the follow-up he did not receive any further treatment evaluation for his melanoma till he is admitted to our hospital on 2/11/2020 because of multiple seizures. I saw initially on 2/11/2020 after he presented to ER because of multiple seizures as associated with a decline in the level of the consciousness. While the patient was in the ER he became hypoxic and failed to protect his airway and as a results, patient was admitted and intubated. He underwent brain MRI that showed multiple brain lesions consistent with multiple brain metastatic lesion.      -At that time, and given patient current neurological status, radiological features of his brain MRI (multiple lesions),  His Karnofsky performance score and after long discussion with other treatment team (radiation oncology), I did not belive that the  patient is a good candidate for neurosurgical intervention (unless a biopsy is requested by other treatment team). For that reason, and after patient medical condition was improved, he was discharged and follow up with radiation and medical oncology. Patient underwent radiation treatment. Dr. Karis Sexton.   However the recent brain MRI follow-up that patient had showed

## 2020-04-20 NOTE — PROGRESS NOTES
patient met with radiation oncology regarding further radiation therapy to the left parietal lobe lesion. This is going to be initiated very soon. I had a long discussion with the patient and his wife today regarding the results of the scan and his overall prognosis. I explained to the patient that his prognosis is very poor given what appears to be resistant central nervous system metastases. The patient's mental status is such that I am not sure if he fully comprehends the seriousness of his disease. The patient's wife does have an understanding of the seriousness and understand his prognosis is very poor. After radiation therapy treatment of the patient continues to be symptomatic he may be a good candidate for palliative care/hospice care approach of therapy. At this point I do not think systemic therapy is going to be helpful to the patient. If you asked the patient directly he has no complaints on his review of systems but this may be impaired because of the patient's confusion. His ECOG performance status is level 2-3 as he needs significant care from his wife for his daily activities of living. PMH, SH, and FH:  I reviewed the patients medication list and allergy list as noted on the electronic medical record. The PMH, SH and FH were also reviewed as noted on the EMR. Review of Systems  The ability to get an accurate review of systems is paired by the patient's confusion. Constitutional: Negative. HENT: Negative. Eyes: Negative. Respiratory: Negative. Cardiovascular: Negative. Gastrointestinal: Negative. Genitourinary: Negative. Musculoskeletal: Negative. Skin: Negative. Neurological: Confusion. Hematological: Negative. Psychiatric/Behavioral: Negative. Objective:   Physical Exam  Vitals:    04/09/20 1115   BP: (!) 158/89   Pulse: 96   Resp: 18   Temp: 99.4 °F (37.4 °C)   SpO2: 100%   Vitals reviewed and are stable. Constitutional: Appears well.  No acute

## 2020-05-05 NOTE — PATIENT INSTRUCTIONS
1. Continue with Depakote  mg twice a day  2. Start Vimpat 50 mg twice a day  3. Depakote level  4. CBC, hepatic function panel  No driving, swimming, operating heavy machinery or compromising heights until event free for 6 months. Report any new events. Call if any questions. You need to avoid alcohol as discussed  Call with any new symptoms or concerns. Follow up in 2-3 months.

## 2020-05-06 NOTE — PROGRESS NOTES
without rigidity, there is no limitation of range of motion of the neck. DTR's are intact distal and symmetric  Babinski sign negative  Motor exam is 5/5 in the bilateral  upper and -3/5 in bialteral lower extremities. Normal muscle tone. There is no muscle atrophy. Sensory is intact for light touch   Coordination: finger to nose,  intact  Gait and station guarded uses walker   Abnormal movement none, vibration unreliable on testing  Skin - warm, dry to touch, normal coloration, no rashes, no suspicious skin lesions  Superficial temporal artery pulses are normal.   There is no resting tremor, no pin rolling, no bradykinesia, no Hypohonia, normal blink rate. Musculoskeletal: Has no hand arthritis, no limitation of ROM in any of the four extremities. There is no leg edema. The Heart was regular in rate and rhythm. No heart murmur  Chest Clear, with  good effort. Abdomen soft, intact bowel sounds. 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 History of melanoma. Brain lesions. COMPARISON: Head CT 2/12/2020. TECHNIQUE: 1 mm axial images were obtained through the head and neck after the fast bolus administration of contrast. A noncontrast localizer was obtained. 3-D reconstructions were performed on a dedicated 3-D workstation. These include multiplanar MPR   images and multiplanar MIP images. Centerline reconstructions were obtained of the carotid systems. Isovue intravenous contrast was given. All CT scans at this facility use dose modulation, iterative reconstruction, and/or weight-based dosing when appropriate to reduce radiation dose to as low as reasonably achievable. FINDINGS:      CTA NECK:    Aortic arch and branches: There is some minimal atherosclerosis of the aortic arch.  There is no stenosis at the origin of the innominate artery, left common carotid artery or either subclavian artery. Right common carotid artery/ICA: The right common carotid artery is normal. The right carotid bulb is normal. The right internal carotid artery is normal. There is no stenosis. Left common carotid artery/ICA: The left internal carotid artery is normal. There is a minimal area of soft plaque in the left carotid bulb. There is no stenosis. There is no stenosis of the left internal carotid artery. Vertebral arteries: The right vertebral artery is normal. The left vertebral artery is also normal. No stenosis on either side. 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 This projects posteriorly and inferiorly. There is no stenosis of either internal carotid artery. Middle cerebral arteries: Normal. The proximal branches are also normal.    Anterior cerebral arteries: Normal. The proximal branches are normal.     Vertebral arteries: The vertebral arteries are normal.    Basilar artery: Normal.    Superior cerebellar arteries: Normal.    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 The right P2 segment is supplied by the right posterior communicating artery. There is a normal left posterior cerebral artery. No other aneurysms are noted. There are no stenoses or occlusions. The superior sagittal sinus, vein of Antelmo, internal cerebral veins, straight sinus, transverse sinuses and sigmoid sinuses are patent. Axial source data: There is a right upper lobe soft tissue nodule. This was noted on a chest CT from one day ago. The patient is intubated and has an esophageal route tube in place. There is no cervical adenopathy. The right maxillary sinus is completely   opacified.  There contain minor errors which are inherent in voice recognition technology. **    Final report electronically signed by Dr. Ashvin Jaramillo on 2/12/2020 8:03 AM    I reviewed the MRI  brain and agree with interpretation. Results for orders placed during the hospital encounter of 04/02/20   MRI Brain W WO Contrast    Narrative PROCEDURE: MRI BRAIN W WO CONTRAST    CLINICAL INFORMATIONSeizures ([facility] [address] Lightheadedness for a few months. Melanoma. Brain metastases. COMPARISON: Brain MRI 2/10/2020. TECHNIQUE: Multiplanar and multiple spin echo T1 and T2-weighted images were obtained through the brain before and after the administration of intravenous contrast.    FINDINGS:    Metastases in the paramedian left parietal lobe have increased in size. The largest lesion measures 4.4 cm in longest dimension. This previously measured 3.1 cm. Lesion posterior to this now measures 2 cm. This previously measured 1.5 cm. The left temporal lobe metastasis now measures 3.2 cm (previously measuring 3.1 cm. A posterior left temporal lobe lesion is stable measuring 1.5 cm. A small lesion in the right occipital lobe has increased in size now measuring 4 mm, previously   measuring 3 mm. There are stable small metastases in both cerebellar hemispheres. There are small metastases in the right frontal lobe and the inferior left frontal lobe. All the metastases have high signal on the T1-weighted images and the larger metastases have susceptibility artifact. There is no restricted diffusion suggesting an infarct. There is no hydrocephalus. The ventricles are smaller. There is midline shift to the right. This is new. This measures 8 mm. On the FLAIR and T2-weighted sequences, there is worsening vasogenic edema in the posterior left frontal lobe and the left parietal lobe. There is worsening edema in the left temporal lobe and left basal ganglia.     There is stable volume loss and encephalomalacia in the inferior frontal lobes day in addition to the Depakote  mg twice a day. We will order lab work checking for Depakote level, CBC, and hepatic function panel. He was counseled on the importance of refraining from driving, swimming, operating heavy machinery, or compromising heights as well as avoiding alcohol. After detailed discussion with the patient and his wife we agreed on the following plan. Plan    1. Continue with Depakote  mg twice a day  2. Start Vimpat 50 mg twice a day  3. Depakote level  4. CBC, hepatic function panel  5. No driving, swimming, operating heavy machinery or compromising heights until event free for 6 months. Report any new events. Call if any questions. 6. You need to avoid alcohol as discussed  7. Call with any new symptoms or concerns. 8. Follow up in 2-3 months. Time spent evaluating patient, reviewing records/imaging, counseling, arranging for care was more than 65 min. All patient's questions were answered. Please call if any questions.       Apolinar Roberts MD

## 2020-05-12 PROBLEM — R56.9 SEIZURE (HCC): Status: ACTIVE | Noted: 2020-01-01

## 2020-05-12 NOTE — H&P
left temporal and parietal lobes. Please refer to corresponding brain MR dated 4/2/2020. There is surrounding vasogenic edema bilaterally. Near complete opacification of the right maxillary sinus and partially opacified right ethmoid sinus. Mastoid air cells are patent. Skull: Unremarkable. Soft tissues: Unremarkable. Redemonstrated are multiple intracerebral mixed attenuating/hemorrhagic masses largest in the left high parietal lobe mass effect, surrounding vasogenic edema and slight left to right midline shift. This lesion has increased in size when compared to prior head CTA examination dated February 12, 2020 but similar to prior brain MRI dated 4/2/2020. Continued follow-up is advised. **This report has been created using voice recognition software. It may contain minor errors which are inherent in voice recognition technology. ** Final report electronically signed by Dr. Carlos Carty on 5/12/2020 5:07 PM      Electronically signed by Kely Jackman MD on 5/12/2020 at 5:51 PM

## 2020-05-12 NOTE — ED PROVIDER NOTES
[address]  Pt Name: Mahendra Collazo  MRN: [id number]  [place] 1975  Date of evaluation: 5/12/2020  Provider: KAREN Grubbs CNP    CHIEF COMPLAINT       Chief Complaint   Patient presents with    Seizures       Nurses Notes reviewed and I agree except as noted in the HPI. HISTORY OF PRESENT ILLNESS    Mahendra Collazo is a 50 y.o. male whopresents to the emergency department from home with a seizure. Patient has a hx of multiple malignant neoplasms of the brain 2/2 melanoma. His last seizure was in February. Today he had one that last 25 minutes and he has not returned to baseline. Patient is very agitated and uncooperative. He cannot provide any history. I did speak to his wife on the phone. HPI was provided by the patient. Triage notes and Nursing notes were reviewed by myself. Any discrepancies are addressed above. REVIEW OF SYSTEMS     Review of Systems   Unable to perform ROS: Mental status change          All pertinent systems were reviewed and are negative unless indicated in the HPI. PAST MEDICAL HISTORY    has a past medical history of Acute respiratory failure ([facility] [address] Seizures ([facility] [address] and TBI (traumatic brain injury) ([facility] [address] SURGICAL HISTORY      has a past surgical history that includes craniotomy. CURRENT MEDICATIONS       Previous Medications    DIVALPROEX (DEPAKOTE ER) 250 MG EXTENDED RELEASE TABLET    Take 3 tablets by mouth 2 times daily    FOLIC ACID (FOLVITE) 1 MG TABLET    Take 1 tablet by mouth daily    LACOSAMIDE (VIMPAT) 50 MG TABS TABLET    Take 1 tablet by mouth 2 times daily for 123 days.     LISINOPRIL (PRINIVIL;ZESTRIL) 10 MG TABLET    Take 1 tablet by mouth daily    MULTIPLE VITAMIN (MULTIVITAMIN) TABLET    Take 1 tablet by mouth daily    PANTOPRAZOLE (PROTONIX) 40 MG TABLET    Take 40 mg by mouth daily    QUETIAPINE (SEROQUEL) 50 MG TABLET    Take 1 tablet by mouth 2 times daily    VITAMIN B-1 (THIAMINE) 100 MG TABLET    Take 100 mg by mouth daily       ALLERGIES     has No Known Allergies. FAMILY HISTORY     He indicated that his mother is alive. He indicated that his father is alive. family history includes High Blood Pressure in his father and mother; High Cholesterol in his father and mother; Thyroid Disease in his mother. SOCIAL HISTORY      reports that he has been smoking cigarettes. He has been smoking about 0.50 packs per day. He has never used smokeless tobacco. He reports current alcohol use. He reports that he does not use drugs. PHYSICAL EXAM     INITIAL VITALS:  height is 5' 10\" (1.778 m) and weight is 180 lb (81.6 kg). His oral temperature is 97.9 °F (36.6 °C). His blood pressure is 124/76 and his pulse is 72. His respiration is 20 and oxygen saturation is 94%. Physical Exam  Vitals signs and nursing note reviewed. Constitutional:       General: He is not in acute distress. Appearance: Normal appearance. He is well-developed. HENT:      Head: Normocephalic and atraumatic. Neck:      Musculoskeletal: Normal range of motion. Cardiovascular:      Rate and Rhythm: Normal rate and regular rhythm. Pulses: Normal pulses. Heart sounds: Normal heart sounds. No murmur. Pulmonary:      Effort: Pulmonary effort is normal.      Breath sounds: Normal breath sounds. Abdominal:      General: Abdomen is flat. Musculoskeletal: Normal range of motion. Skin:     General: Skin is warm and dry. Capillary Refill: Capillary refill takes less than 2 seconds. Neurological:      Mental Status: He is alert. He is disoriented. Psychiatric:         Speech: He is noncommunicative. Behavior: Behavior is uncooperative.            DIFFERENTIAL DIAGNOSIS:   Including but not limited to seizure, postictal, brain tumor    DIAGNOSTIC RESULTS     EKG: AllEKG's are interpreted by the Emergency Department Physician who either signs or Co-signs this chart in the absence of a insulin lispro (HUMALOG) injection vial 0-6 Units (has no administration in time range)   insulin lispro (HUMALOG) injection vial 0-3 Units (has no administration in time range)   LORazepam (ATIVAN) injection 1 mg (1 mg Intravenous Given 5/12/20 1518)   levETIRAcetam (KEPPRA) 1,500 mg in sodium chloride 0.9 % 100 mL IVPB (0 mg Intravenous Stopped 5/12/20 1717)   metoclopramide (REGLAN) injection 10 mg (10 mg Intravenous Given 5/12/20 1630)   diphenhydrAMINE (BENADRYL) injection 50 mg (50 mg Intravenous Given 5/12/20 1630)   Dexamethasone Sodium Phosphate injection 10 mg (10 mg Intravenous Given 5/12/20 1630)           I have given the patient strict written and verbal instructions about care at home,follow-up, and signs and symptoms of worsening of condition and they did verbalize understanding. Patient was seen independently by myself. The Patient's final impression and disposition and plan was determined by myself. CRITICAL CARE:   none    CONSULTS:  None    PROCEDURES:  None    FINAL IMPRESSION      1. Malignant melanoma of upper back ([facility] [address]    2. Secondary malignant neoplasm of brain ([address]    3. Seizures ([facility] [address]    4.  Postictal headache          DISPOSITION/PLAN   DISPOSITION Admitted 05/12/2020 05:29:27 PM        PATIENT REFERRED TO:  Siegfried Holter, MD  [address]            DISCHARGE MEDICATIONS:  New Prescriptions    No medications on file       (Please note that portions of this note were completed with a voice recognition program.  Efforts were made to edit thedictations but occasionally words are mis-transcribed.)    KAREN Cabezas - KAREN Jimenez CNP  05/12/20 5898

## 2020-05-12 NOTE — ED NOTES
Pt transported to 4A12 by cart in stable condition. Pt monitored on telemetry. Pt on LPM O2 via Nasal Cannula. IV fluids running and IV is patent. Called [facility] and informed them that the patient was on their way to the unit.       Genesis Jesus LPN  62/88/24 3422

## 2020-05-12 NOTE — ED TRIAGE NOTES
Arrives to  ER for the evaluation of seizure today around 1315 per EMS report. Patient does not recall the seizure. Was reported from EMS member that it lasted approx 20 min and was witnessed by his wife. Patient is alert to name, place but not time or situation. Patient resides at home with his wife. He has a Hx of TBI, seizures, Craniotomy. Is incontinent of urine post ectal.  Patient is cursing and attempting to get out of bed. Nurse was able to assist patient to stand at bedside to remove wet pants and change linens. Dayron Ramsey NP in room to assess. Side rails padded. Bedside sitter requested. Will monitor.

## 2020-05-12 NOTE — ED NOTES
ED to inpatient nurses report    Chief Complaint   Patient presents with    Seizures      Present to ED from home  LOC: Pt is asleep after receiving medication that makes him drowsy,  Vital signs   Vitals:    05/12/20 1636 05/12/20 1725 05/12/20 1737 05/12/20 1749   BP: (!) 148/99 124/76     Pulse: 110 55 72    Resp: 18 18 17 20   Temp:       TempSrc:       SpO2: 96% 95% 94% 94%   Weight:       Height:          Oxygen Baseline RA    Current needs required RA Bipap/Cpap No  LDAs:    Mobility: Requires assistance * 2  Pending ED orders: n/a  Present condition: Pt resting on cot with sitters at bedside. Pt is asleep with even and unlabored respirations.     Electronically signed by Marisol Cuellar RN on 5/12/2020 at 5:53 PM       Marisol Cuellar, [address] [place] [address]  05/12/20 0614

## 2020-05-12 NOTE — ED NOTES
Called and updated patients wife Miryam of patient condition and plan for admission. Wife tells nurse at this time that patient had a seizure today while they were sitting together watching TV. Started today around 1300 and lasted 20-25 min. Patient has a Hx of 7 brain tumors and aneurysm per wife. Wife reports that patient did drink 1-2 cans of beer 2 days ago but did not have any issues until today when had a seizure. Wife states that 4/10/20 was when he last had a seizure before today.            Valeria Moses RN  05/12/20 7147

## 2020-05-13 NOTE — PROGRESS NOTES
Hospitalist Progress Note    Patient:  Solitario Bruno      Unit/Bed:4A-12/012-A    YOB: 1975    MRN: [id number]       Acct: [de-identified]     PCP: Mariel Reyes MD    Date of Admission: 5/12/2020    Chief Complaint: Seizure     Hospital Course:     Please see H/P for details. In summary, this is a 50 y.o. male, with past medical history of melanoma, metastatic to brain, seizure, TBI, who was brought to [facility] on 5/12/2020 due to seizure. Per H&P note, \"History is limited as patient is postictal.  Apparently patient had a prolonged seizure that started at 1:00 PM - 1:25 pm.  Per EMS apparently the episode lasted for 25 minutes and was witnessed by the wife. She states that patient woke up this morning took his medications and then sat down watching TV. Later in the day around 1 PM patient had the seizure. She states it lasted for 25 minutes and then ended on its own. She called EMS and by the time they arrived patient was already postictal.  There was no loss of urine or bowels. Patient does have a recent history of melanoma metastasis to brain. Patient had radiation on his last admission. Per wife apparently he was scheduled to go this coming Thursday for further radiation. At this time patient is postictal and no other further history is available\". In ER, patient's vitals were stable. Labs were unremarkable. Patient had a repeat CT head which showed multiple intracerebral hemorrhagic masses with surrounding vasogenic edema and slight left-to-right midline shift. The lesions has increased in sized from previous CT. patient was admitted under hospital medicine service. He was started on Keppra and Decadron. 5/13: Overnight, per RN, patient had one seizure this morning. Keppra was changed to Depakote due to agitation (per RN, patient was agitated when he received Keppra). Per RN, patient is lethargic and does not follow command.       Subjective:

## 2020-05-13 NOTE — PLAN OF CARE
Problem: Falls - Risk of:  Goal: Will remain free from falls  Description: Will remain free from falls  Outcome: Met This Shift  Note: Patient remained free from falls this shift. Call light in reach. Bed alarm on. Sitter at bedside for patient safety. Problem: Falls - Risk of:  Goal: Absence of physical injury  Description: Absence of physical injury  Outcome: Met This Shift  Note: Patient remained free from physical injury this shift. Call light in reach. Bed alarm. Sitter at bedside. Bed rails padded, seizure precautions. Problem: Cardiovascular  Goal: Hemodynamic stability  Outcome: Met This Shift  Note: Vitals stable. Will continue to monitor vitals Q4H and PRN. Continuous telemetry: NSR/SB. Problem: Respiratory  Goal: No pulmonary complications  Outcome: Met This Shift  Note: Patient lung sounds clear/diminished. Patient able to maintain patent airway this shift. Cough strong non-productive. O2 saturation above 92% on room air. Problem: Neurological  Goal: Maximum potential motor/sensory/cognitive function  Outcome: Ongoing  Note: Patient alert to name only. Patient remains in a postictal state. Physically and verbally aggressive. Strong all extremities. Problem: Coping:  Goal: Ability to identify appropriate support needs will improve  Description: Ability to identify appropriate support needs will improve  Outcome: Ongoing  Note: Patient confused and unable to identify needs at this time. Will continue to monitor for ability to identify coping mechanism. Problem: Safety:  Goal: Ability to remain free from injury will improve  Description: Ability to remain free from injury will improve  Outcome: Ongoing  Note: Patient remained free from physical injury this shift. Call light in reach. Bed alarm. Sitter at bedside. Bed rails padded, seizure precautions.       Problem: Discharge Planning:  Goal: Discharged to appropriate level of care  Description: Discharged to appropriate level of care  Outcome: Ongoing  Note: Patient from home with wife, plans to return home with wife. Discharge plans pending clinical course. Patient's wife participated in plan of care and contributed to goal setting.

## 2020-05-13 NOTE — PROGRESS NOTES
side.        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 This projects posteriorly and inferiorly. There is no stenosis of either internal carotid artery. Middle cerebral arteries: Normal. The proximal branches are also normal.    Anterior cerebral arteries: Normal. The proximal branches are normal.     Vertebral arteries: The vertebral arteries are normal.    Basilar artery: Normal.    Superior cerebellar arteries: Normal.    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 The right P2 segment is supplied by the right posterior communicating artery. There is a normal left posterior cerebral artery. No other aneurysms are noted. There are no stenoses or occlusions. The superior sagittal sinus, vein of Antelmo, internal cerebral veins, straight sinus, transverse sinuses and sigmoid sinuses are patent. Axial source data: There is a right upper lobe soft tissue nodule. This was noted on a chest CT from one day ago. The patient is intubated and has an esophageal route tube in place. There is no cervical adenopathy. The right maxillary sinus is completely   opacified. There are bilateral brain lesions. These are larger on the left than the right. Impression    1. 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 2. No stenosis of either carotid bulb. **This report has been created using voice recognition software. It may contain minor errors which are inherent in voice recognition technology. **    Final report electronically signed by Dr. Walter Chavez on 2/12/2020 8:03 AM     No results found for this or any previous visit.   Results for orders placed during the is complete opacification of the right maxillary sinus. There is a possible right nasal polyp. This was also present previously. Impression    1. Enlarging metastases in the left parietal lobe. There is also worsening surrounding edema. 2. New midline shift to the right. 3. Some smaller metastases are stable. Others have slightly increased in size. These findings were telephoned to Dr. Joellen Sapp, at 2:40 PM on 4/2/2020. **This report has been created using voice recognition software. It may contain minor errors which are inherent in voice recognition technology. **      Final report electronically signed by Dr. Christine Hammond on 4/2/2020 2:40 PM     No results found for this or any previous visit. Results for orders placed during the hospital encounter of 05/12/20   CT HEAD WO CONTRAST    Narrative PROCEDURE: CT HEAD WO CONTRAST    CLINICAL INFORMATION: seizure, brain mets. COMPARISON: No prior study. TECHNIQUE: Noncontrast 5 mm axial images were obtained through the brain. All CT scans at this facility use dose modulation, iterative reconstruction, and/or weight-based dosing when appropriate to reduce radiation dose to as low as reasonably achievable. FINDINGS:    Opacified paranasal sinuses. Multiple intracerebral masses with mixed attenuation some of which with central high density some of which with internal increased density. Findings in keeping with known metastatic disease. Largest in the left high parietal   lobe medially with mass effect left lateral ventricle posterior horn. This lesion measures 4.9 x 3.2 cm. Smaller right 8 mm frontal lobe lesion. Left rounded temporal lobe 3.2 x 2.4 cm lesion. There is slight mass effect and shift of mediastinal   structures from left to right by about 4 mm. Mild prominence of the ventricles. Additional smaller metastatic foci are also noted in the left temporal and parietal lobes. Please refer to corresponding brain MR dated 4/2/2020.  There is

## 2020-05-13 NOTE — PROGRESS NOTES
Patient's wife Miryam called for update on patient's condition, HIPPA code verified. Updated that patient remains soloment at this time, is not following commands, and at this time is not safe to go back home or eat/drink anything. Patient's wife remains hopeful that patient will return home with her. This RN informed her that a neurosurgeon and Dr. Vicenta Fox and Dr. Tito Silverio were consulted to evaluate patient for possible treatment options but from last appointment with Dr. Tito Silverio it sounds like there may not be any further treatments available. Discussed in depth with Miryam that patient's may not return home with her if his condition does not improve and that we might need a plan B for discharge. Wife requested to come see him, this RN explained that a family meeting would be appropriate at this time to discuss a goal for patient's care. 0945: Hugo Silva with Palliative Care updated on situation. She requested if Miryam comes up to visit patient, to call her to come speak with her.

## 2020-05-13 NOTE — PROCEDURES
Central Line Placement: Risks and benefits to the procedure were discussed. Alternatives and their risks were discussed as well. After informed consent was obtained, patient was placed in the attention position. Patient was placed in deep Trendelenburg position. Patient was prepped using Chlorhexidene prep. Patient was draped utilizing sterile gloves, hair net, mask, sterile gown and sterile OR towels. Maximum barrier precautions were utilized. Utilizing the left subclavian approach, patient received 5 cc of 1% lidocaine. Utilizing an introducer needle, it was placed subcutaneously advanced under the clavicle and leveled flat. It was subsequently advanced toward the thoracic inlet, until venous return was obtained. A guide wire was placed through the introducer needle. The introducer needle was subsequently withdrawn leaving the guide wire in place. Utilizing a scalpel, a small incision was made in the skin. A punch dilator was placed over the guide wire and subsequently withdrawn leaving the guide wire in place. A triple lumen catheter was subsequently placed over the guide wire. The guide wire was subsequently withdrawn leaving this catheter in place. All ports had good venous return and were subsequently flushed with saline. The catheter was secured using 2.0 silk. Secondary cleansing with chlorhexidine prep was subsequently placed. Tegaderm with bio- patch dressing was utilized for secondary securing and protection. There were no complications. EBL:   Less than 5 mL      Indication:  resp failure    Electronically signed by Jose Chapman.  Codie Alegria MD

## 2020-05-13 NOTE — PROGRESS NOTES
Patient transferred from [address] [place] [address],[address] to [phone]. SpO2 83% on room air, placed on nonrebreather 15L. Tommy RN at bedside with respiratory therapy and provider preparing for intubation. Patient transported with all belongings. Connected to bedside monitor. VS and assessment data obtained.

## 2020-05-13 NOTE — CONSULTS
[facility]    Patient - Marcin Irving   MRN -  [id number]   [facility] # - [de-identified]   - 1975      Date of Admission -  2020  2:42 PM  Date of evaluation -  2020  Room - 4D-14/014-A   Hospital Day - [address], [place] Primary Care Physician - Ilan Ramirez MD       Reason for Consult    Metastatic melanoma   [address] Problems    Diagnosis Date Noted    Seizure [facility]) [R56.9] 2020     HPI   Marcin Irving is a 50 y.o. male admitted for seizures. The patient has a history of metastatic melanoma. He was hospitalized in 2020 related to seizures and was found to have numerous enhancing lesions in the brain consistent with metastatic disease. He was started on course of radiation therapy. He had follow up with Dr. Joellen Sapp on 20 and was recommended hospice as no further treatment options from Medical Oncology standpoint due to disease progression. The patient presented to the ED on 20 following seizure. CT of the head showed multiple intracerebral mixed attenuating/hemorrhagic masses largest in the left high parietal lobe mass effect, surroudning vasogenic edema and slight left to right midline shift. Increased in size since 2020. He was admitted under the Hospitalist Service and Neurology, Neurosurgery were consulted. Medical Oncology consult was requested to follow up on above. The patient has been moved to ICU and is currently intubated for airway protection related to recurrent seizure like activity this am. He is sedated. ROS unable to be obtained. Discussed with primary RN, Lyndle Severance. Oncology History    The patient has a history of a melanoma excision from his left upper back in 2012. After his initial surgery he was lost to follow-up and had no further treatment or evaluation for his melanoma.   The patient presented to [facility] with new onset seizure sinuses. Multiple intracerebral masses with mixed attenuation some of which with central high density some of which with internal increased density. Findings in keeping with known metastatic disease. Largest in the left high parietal lobe medially with mass effect left lateral ventricle posterior horn. This lesion measures 4.9 x 3.2 cm. Smaller right 8 mm frontal lobe lesion. Left rounded temporal lobe 3.2 x 2.4 cm lesion. There is slight mass effect and shift of mediastinal structures from left to right by about 4 mm. Mild prominence of the ventricles. Additional smaller metastatic foci are also noted in the left temporal and parietal lobes. Please refer to corresponding brain MR dated 4/2/2020. There is surrounding vasogenic edema bilaterally. Near complete opacification of the right maxillary sinus and partially opacified right ethmoid sinus. Mastoid air cells are patent. Skull: Unremarkable. Soft tissues: Unremarkable. Redemonstrated are multiple intracerebral mixed attenuating/hemorrhagic masses largest in the left high parietal lobe mass effect, surrounding vasogenic edema and slight left to right midline shift. This lesion has increased in size when compared to prior head CTA examination dated February 12, 2020 but similar to prior brain MRI dated 4/2/2020. Continued follow-up is advised. **This report has been created using voice recognition software. It may contain minor errors which are inherent in voice recognition technology. ** Final report electronically signed by Dr. Ian Richardson on 5/12/2020 5:07 PM    Xr Chest Portable    Result Date: 5/13/2020  PROCEDURE: XR CHEST PORTABLE CLINICAL INFORMATION: Crackles. COMPARISON: CT chest dated 2/11/2020 and chest radiograph dated 2/11/2020. TECHNIQUE: AP portable chest radiograph performed. FINDINGS: POSTSURGICAL CHANGES: None. LINES/TUBES/MECHANICAL DEVICES: None. TRACHEA/HEART/MEDIASTINUM/HILUM: Unremarkable. LUNG FIELDS: 1.  There is a known mass at the right lung base. Additional nodules were seen on the chest on the previous CT chest examination dated 2/11/2020. Please refer to this examination for further evaluation of the lung nodules. 2. There is no consolidation or infiltrates. There is no pleural effusion or pulmonary vascular congestion. OTHER: None. PNEUMOTHORAX:  None. OSSEOUS STRUCTURES: 1. No acute osseous abnormality. 1. There is a known mass at the right lung base. Additional nodules were seen on the chest on the previous CT chest examination dated 2/11/2020. Please refer to this examination for further evaluation of the lung nodules. 2. There is no consolidation or infiltrates. There is no pleural effusion or pulmonary vascular congestion. **This report has been created using voice recognition software. It may contain minor errors which are inherent in voice recognition technology. ** Final report electronically signed by Dr. Aline Murray on 5/13/2020 11:02 AM      Assessment/Recommendations    1. Metastatic Melanoma - diagnosed in February 2020. Has been undergoing radiation therapy. He was evaluated by Dr. Renae Mccain on 5/7/20 and was recommended hospice as no further treatment options from Medical Oncology standpoint due to progression and overall poor performance status. CT of head on 5/12/20 showed intracerebral mixed attenuating/hemorrhagic masses largest in the left high parietal lobe, mass effect, surrounding vasogenic edema and slight left to right midline shift increased from prior exam. He has known pulmonary metastasis. Patient is currently intubated related to respiratory distress and airway protection due to recurrent seizure like activity this morning. Due to declining status and no further treatment options from Medical Oncology, recommend hospice. Attempted to call the patient's wife, Mariluz Barrett, at listed number and no answer. Discussed recommendations with Attending Intensivist team and primary RN.  Recommend to continue decadron for

## 2020-05-13 NOTE — ED PROVIDER NOTES
ATTENDING NOTE:    I supervised and discussed the history, physical exam and the management of this patient with the NP. I reviewed the Nps note and agree with the documented findings and plan of care.       Electronically verified by Dnae Harman DO  05/13/20 [id number]

## 2020-05-13 NOTE — CONSULTS
NEUROLOGY CONSULT NOTE      Requesting Physician: William Barron MD    Reason for Consult:  Evaluate for seizures    History of Present Illness:  Solitario Bruno is a 50 y.o. male admitted to [facility] on 5/12/2020.       50year old man with hx of melanoma with mets to the brain, he came into the ED with seizure like activity and patient remained post ictal with agitation. It was reported patient had the seizure around 1pm and last 25 mins. CT head of the brain in the ED confirm multiple mets to the brain. Patient was taking vimpat 50mg BID and depakote 750mg BID.  Pt is agitated and too confused to tell me anything and he has no family around        Past Medical History:        Diagnosis Date    [facility] failure ([facility]     Seizures ([facility]     TBI (traumatic brain injury) ([facility] [address] [facility] 2009           Procedure Laterality Date    CRANIOTOMY         Allergies:    No Known Allergies     Current Medications:   LORazepam (ATIVAN) injection 2 mg, Q4H PRN  Dexamethasone Sodium Phosphate injection 4 mg, Q6H  [START ON 5/13/2020] pantoprazole (PROTONIX) tablet 40 mg, QAM AC  [START ON 5/13/2020] insulin lispro (HUMALOG) injection vial 0-6 Units, TID WC  insulin lispro (HUMALOG) injection vial 0-3 Units, Nightly  multivitamin 1 tablet, Daily  levETIRAcetam (KEPPRA) 500 mg in sodium chloride 0.9 % 100 mL IVPB, Q12H  haloperidol lactate (HALDOL) injection 1 mg, Q6H PRN  lacosamide (VIMPAT) 50 mg in dextrose 5 % 55 mL IVPB, BID         Social History:  Social History     Tobacco Use   Smoking Status Current Every Day Smoker    Packs/day: 0.50    Types: Cigarettes   Smokeless Tobacco Never Used     Social History     Substance and Sexual Activity   Alcohol Use Yes    Drinks per session: 1 or 2    Binge frequency: Daily or almost daily     Social History     Substance and Sexual Activity   Drug Use No         Family History:       Problem Relation Age of Onset    High Blood Pressure Mother     carotid artery or either subclavian artery. Right common carotid artery/ICA: The right common carotid artery is normal. The right carotid bulb is normal. The right internal carotid artery is normal. There is no stenosis. Left common carotid artery/ICA: The left internal carotid artery is normal. There is a minimal area of soft plaque in the left carotid bulb. There is no stenosis. There is no stenosis of the left internal carotid artery. Vertebral arteries: The right vertebral artery is normal. The left vertebral artery is also normal. No stenosis on either side. 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 This projects posteriorly and inferiorly. There is no stenosis of either internal carotid artery. Middle cerebral arteries: Normal. The proximal branches are also normal.    Anterior cerebral arteries: Normal. The proximal branches are normal.     Vertebral arteries: The vertebral arteries are normal.    Basilar artery: Normal.    Superior cerebellar arteries: Normal.    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 The right P2 segment is supplied by the right posterior communicating artery. There is a normal left posterior cerebral artery. No other aneurysms are noted. There are no stenoses or occlusions. The superior sagittal sinus, vein of Antelmo, internal cerebral veins, straight sinus, transverse sinuses and sigmoid sinuses are patent. Axial source data: There is a right upper lobe soft tissue nodule. This was noted on a chest CT from one day ago. The patient is intubated and has an esophageal route tube in place. There is no cervical adenopathy. The right maxillary sinus is completely   opacified. There are bilateral brain lesions.  These are larger on the left density. Findings in keeping with known metastatic disease. Largest in the left high parietal   lobe medially with mass effect left lateral ventricle posterior horn. This lesion measures 4.9 x 3.2 cm. Smaller right 8 mm frontal lobe lesion. Left rounded temporal lobe 3.2 x 2.4 cm lesion. There is slight mass effect and shift of mediastinal   structures from left to right by about 4 mm. Mild prominence of the ventricles. Additional smaller metastatic foci are also noted in the left temporal and parietal lobes. Please refer to corresponding brain MR dated 4/2/2020. There is surrounding vasogenic edema bilaterally. Near complete opacification of the right maxillary sinus and partially opacified right ethmoid sinus. Mastoid air cells are patent. Skull: Unremarkable. Soft tissues: Unremarkable. Impression Redemonstrated are multiple intracerebral mixed attenuating/hemorrhagic masses largest in the left high parietal lobe mass effect, surrounding vasogenic edema and slight left to right midline shift. This lesion has increased in size when compared to   prior head CTA examination dated February 12, 2020 but similar to prior brain MRI dated 4/2/2020. Continued follow-up is advised. **This report has been created using voice recognition software. It may contain minor errors which are inherent in voice recognition technology. **    Final report electronically signed by Dr. Claire Cosme on 5/12/2020 5:07 PM         We reviewed the patient records and available information in the EHR       Impression:    50year old man with hx of melanoma mets to multiple areas of the brain mets, brain mets necrosis, and hx of seizure, now with breakthrough seizure.     Recommendations:    - given patient's agitation, I do not recommend keppra, keppra will make him more agitated  - get EEG  - will increase depakote to 1000mg BID, since his level is rather low  - con't vimpat at 50mg BID  - given the multiple mets to the brain, recommend consulting palliative  - decadron 4mg q6hr for tumor related cerebral edema  - PPI  - lovenox for DVT prophylaxis, benefit outweight the risk, unless patient opts for comfort care. I spend a total of 55 minutes with greater than 60% of time spent face to face, counseling, coordinating care, examining patient, reviewing images and labs personally, and speaking with team.                                              1. Case was discussed with primary service. 2. All questions were answered. It was my pleasure to evaluate Elvi Kongdallin today. Please call with questions.       Electronically signed by Nya Gomez MD on 5/12/2020 at 9:03 PM

## 2020-05-13 NOTE — PROGRESS NOTES
Updated patient's wife, [address], at this time. HIPPA code verified. Explained that patient is now in the ICU requiring the ventilator to breath for him. Informed Miryam that patient is also sedated at this time for his comfort. Informed miryam that patient was seen by a nurse practitioner from Dr. Vicenta Fox office and she would be contacting her. This RN requested Miryam to come in to speak with palliative care and the physician tonight or tomorrow morning. Miryam stated she will be up tomorrow (5/14/20) to speak with physician and palliative care. Message left with palliative care, informed Regla Peterson CNP also.

## 2020-05-13 NOTE — CONSULTS
NEUROSURGERY Cecilia Clark  [id number]   1975   5/13/2020    Requesting physician: Zari Herrmann  Reason for consultation: Vasogenic edema     History of present illness: Niurka Bobo is a 50 y.o. male with history of TBI s/p craniectomy in 2009, seizures, melanoma on left upper back in 2012, and metastasis brain mets in 2/2020 who was admitted on 5/12/2020  2:42 PM . Patient presented to [facility] for concerns of seizure activity. Head CT shows multiple hemorrhagic masses with largest in the left high parietal lobe with mass effect and associated vasogenic edema. Neurosurgery was consulted for further evaluation and recommendations. Review of Systems: unable to assess due to clinical condition     No Known Allergies  Past Medical History:   Diagnosis Date    Acute respiratory failure ([facility]     Seizures ([facility]     TBI (traumatic brain injury) ([facility] 2009      Past Surgical History:   Procedure Laterality Date    CRANIOTOMY       Social History     Occupational History    Not on file   Tobacco Use    Smoking status: Current Every Day Smoker     Packs/day: 0.50     Types: Cigarettes    Smokeless tobacco: Never Used   Substance and Sexual Activity    Alcohol use: Yes     Drinks per session: 1 or 2     Binge frequency: Daily or almost daily    Drug use: No    Sexual activity: Yes     Partners: Female      Family History   Problem Relation Age of Onset    High Blood Pressure Mother     High Cholesterol Mother     Thyroid Disease Mother     High Blood Pressure Father     High Cholesterol Father         No outpatient medications have been marked as taking for the 5/12/20 encounter [facility] Encounter).       Current Facility-Administered Medications   Medication Dose Route Frequency Provider Last Rate Last Dose    LORazepam (ATIVAN) injection 2 mg  2 mg Intravenous Q4H PRN Zari Herrmann MD        Dexamethasone Sodium Phosphate injection 4 mg  4 mg Intravenous Bill Coates MD

## 2020-05-14 NOTE — PROGRESS NOTES
Clinical Pharmacy Note  Valproic Acid Consult  Valproic Acid Level: 22.4 mcg/mL level drawn ~ 22 hours after dose. Also had admission level of 41.7 mcg/mL. Current Valproic Acid Dose: 750 mg IV once (home dose was 750 mg every 12 hours)  Indication for valproic acid: seizure    Assessment/Plan:   Begin 750 mg every 8 hours. Pharmacy will continue to monitor and order valproic acid levels as appropriate.     Danielle SimmsD, BCPS   5/14/2020 12:43 PM

## 2020-05-14 NOTE — PROGRESS NOTES
Patient:  James Le                   Unit/Bed:4D-14/014-A  MRN: [id number]            PCP: Charissa Lainez MD  Date of Admission: 5/12/2020     Assessment and Plan(All pulmonary edema, renal failure, PE, and respiratory failure diagnoses are acute in nature unless otherwise specified):        1. Acute respiratory failure: Patient intubated for airway protection. Patient with gurgling respirations associated with tachypnea. Undergoing lung protection strategies targeting a peak pressure of 35 or less and a plateau of 30 or less. 2. Aspiration pneumonia:  Secondary to E. coli, Haemophilus influenza, and Streptococcus agalactiae. Clearly associated with aspiration event with seizure activity. Zosyn initiated 5/13/2020.  3. Recurrent seizure activity: EEG shows post ictal state. Patient on Depacon and Vimpat. Currently seizures suppressed with propofol. 4. Vasogenic cerebral edema: Secondary metastatic disease. On steroids. 5. Intracranial hemorrhage: Secondary to bleeding from metastatic disease. 6. Brain metastases: Secondary to metastatic melanoma. Very poor response to chemotherapy and radiation therapy. Now associated with cerebral edema and intracranial hemorrhage. 7. Metastatic melanoma: Patient has progression of melanoma with increasing tumor size despite chemotherapy and radiation therapy. Extensive metastatic disease within the brain and lungs. Family agrees with hospice. They would like to have patient home with hospice. 8. Pulmonary metastases: Secondary to metastatic melanoma. 9. Hypertension: On lisinopril. 10. Diabetes mellitus: Sliding scale insulin. 11. Traumatic brain injury: Remote injury. 12. Tobacco abuse: Cessation encouraged. 13. Frequent alcohol consumption: On vitamin supplementation.        CC: Metastatic melanoma  HPI: Patient is a 78-year-old white male active smoker. He has a history of traumatic brain injury in 2009.   Patient had history of left back melanoma lisinopril  10 mg Oral Daily    pantoprazole  40 mg Intravenous Daily    chlorhexidine  15 mL Mouth/Throat BID    piperacillin-tazobactam  3.375 g Intravenous Q8H    glycopyrrolate-formoterol  2 puff Inhalation BID    dexamethasone  4 mg Intravenous Q6H        Vital Signs:   T: 99.3: P: 104: RR: 20: B/P: 93/71: FiO2: 30: O2 Sat: 93: I/O: 3578/2950  GCS:  8:  Body mass index is 26.76 kg/m². Zhane Jo PC: 12/6: TV: 550: RRTotal: 19: Ti:1 sec: ETCO2: xxx. General:   Acutely ill appearing on chronically ill appearing white male. HEENT:  normocephalic and atraumatic. No scleral icterus. PERR. Poor oral dentition. Neck: Stiff. No Thyromegaly. Lungs:  Rhonchi. Respirations unlabored. .    Cardiac: Tachycardia. No JVD. Abdomen: soft. Nontender. Extremities:  No clubbing, cyanosis, or edema x 4. Vasculature: capillary refill < 3 seconds. Palpable dorsalis pedis pulses. Skin:  Moist and hot. Psych:   Sedated on mechanical ventilator. Lymph:  No supraclavicular adenopathy. Neurologic:  No focal deficit. Positive seizures.     Data: (All radiographs, tracings, PFTs, and imaging are personally viewed and interpreted unless otherwise noted). · CT scan head report 5/12/2020: Intracerebral attenuating hemorrhagic masses largest in the left high parietal lobe with mass-effect and surrounding vasogenic edema. Slight left to right midline shift. Lesion has increased in size compared with February 2020. · BioFire positive for E. coli, Haemophilus influenza, and Streptococcus agalactiae. · Chest x-ray shows slight right middle lobe infiltrate. · Valproic acid 22.4. White blood cell count 14.2, hemoglobin 12.9, platelets 689. Sodium 142, potassium 3.8, chloride 108, bicarb 25, BUN 11, creatinine 0.5, glucose 199. · EEG report 5/13/2020: Consistent with postictal state.     CC time 35 minutes. Time was discontiguous. Case discussed with the patient's wife on 5/14/2020.   I discussed with her the progression of

## 2020-05-14 NOTE — PROGRESS NOTES
1140: Pt currently resting in bed and remains sedated on ventilator. Wife present. Dr. Afsaneh Bird visits and reviews pt status and goals of care with wife. Medical issues of concern including cancer, pneumonia, airway, etc reviewed. Dr. Afsaneh Bird reviews with wife that per Oncology, pt is not responding to chemo and hospice/comfort care has been discussed. After discussion with wife, plan is to assess pt status tomorrow for extubation. Wife is receptive to hospice consult after that time and states she would want [address]. Wife's wish is to get pt home if able. Dr. Afsaneh Bird did review concerns of potential for seizures and airway management once off ventilator with wife. Wife states no other questions at this time. Emotional support given. Will follow supportively. Kamar Carrera RN updated.

## 2020-05-14 NOTE — FLOWSHEET NOTE
[address] THERAPY MISSED TREATMENT NOTE  [facility] ICU 4D  4D-14/014-A      Date: 2020  Patient Name: Marcin Irving        CSN: [id number]   : 1975  (50 y.o.)  Gender: male                REASON FOR MISSED TREATMENT: Hold Treatment per Nursing. Pt remains intubated, not appropriate for early mobility at this time. Per RN, meeting later today to further determine plan of care.  Will check back as able

## 2020-05-14 NOTE — PLAN OF CARE
Problem: Nutrition  Goal: Optimal nutrition therapy  Outcome: Ongoing  Nutrition Problem: Inadequate oral intake  Intervention: Food and/or Nutrient Delivery: Continue NPO(Consider initiate TF.)  Nutritional Goals: Pt. will receive adequate nutrition (FL diet vs. TF initiation)

## 2020-05-14 NOTE — PROCEDURES
[address] [id number]                          ELECTROENCEPHALOGRAM REPORT    PATIENT NAME: Glenn Clements                       :        1975  MED REC NO:   [id number]                           ROOM:       0014  ACCOUNT NO:   [de-identified]                           ADMIT DATE: 2020  PROVIDER:     Reji Concepcion. Hoy Shone, MD    DATE OF EE2020    REFERRING PROVIDER:  Heather Tucker MD    CLINICAL HISTORY:  A 78-year-old male presenting with seizure at home,  now returning to baseline. The patient has history of metastatic  melanoma to the brain. MEDICATIONS:  Listed are Ativan, Depakote, lisinopril, quetiapine,  insulin, propofol, ketamine, Decadron, Vimpat. CLINICAL INTERPRETATION:  This is a 17-channel EEG performed without  sleep deprivation. Hyperventilation was not performed. Photic  stimulation was not performed. The patient is described as lethargic. The background rhythm is noted to be of low voltage with fast beta  activity present. Lead and muscle artifacts were noted limiting quality  of data obtained. Hyperventilation and photic stimulation were not  performed. There was no evidence of epileptiform activity appreciated. No clinical seizures were observed. IMPRESSION:  This is an abnormal EEG due to the low voltage background  rhythm with fast beta activity present, this may be seen in postictal  State, or metabolic encephalopathy. Clinical correlation is  recommended. There was no evidence of epileptiform activity appreciated  throughout this recording.         Brittany Lopez MD    D: 2020 8:04:34       T: 2020 8:12:35     [email or web address]  Job#: [id number]     Doc#: [id number]    CC:

## 2020-05-14 NOTE — PROCEDURES
[facility]  Procedure Note    Patient:  Maya Lr  YOB: 1975    MRN: [id number]   Acct:  [de-identified]      5/13/20 2230    Procedure: Intubation    Indications: ET tube cuff leak    Pre procedure Diagnosis: Acute respiratory failure, secondary to airway protection. Aspiration pneumonia. Procedure:     Patient was given 20 mg of Diprivan. Bougie was used for ET tube exchange. Patient intubated with an 8.0 cuffed ET tube passed to 24 cm. Cuff inflated. Confirmation determined by bilateral breath sounds, an end tidal CO2 detector, absence of sounds over the stomach, tube fogging, adequate chest rise, adequate pulse oximetry reading and improved skin color. ET tube secured by respiratory therapist.    Chest x-ray ordered. Results showed appropriate tube position. Procedure tolerated well. Plan:  1.  ET tube replaced.      Critical Care Time: Less than 30 minutes    Deepa Hunt, ASHLEY, APRN-CNP

## 2020-05-14 NOTE — FLOWSHEET NOTE
Pt was unresponsive but he was blessed.       05/14/20 1550   Encounter Summary   Services provided to: Patient   Referral/Consult From: Rounding   Continue Visiting Yes  (5/14 NR P)   Complexity of Encounter Low   Length of Encounter 15 minutes   Routine   Type Initial   Assessment Unable to respond   Intervention [place]

## 2020-05-14 NOTE — PROGRESS NOTES
Clinical Pharmacy Consult  Valproic Acid      Deion Rojas is a 50 y.o. male for whom pharmacy has been consulted to dose valproic acid    Patient Active Problem List   Diagnosis    Seizures ([facility]    History of cerebral hemorrhage    Malignant melanoma of upper back ([facility] [address] [facility]    History of alcohol abuse    Malignant melanoma metastatic to brain ([facility] [address] [facility]    Upper respiratory infection    Seizure ([facility] [address] [facility]    Cerebral edema (HCC)    Lung nodules    Leukocytosis    High anion gap metabolic acidosis    Type 2 diabetes mellitus with hyperglycemia (HCC)    Essential hypertension    Hx of traumatic brain injury     Allergies:  Patient has no known allergies. Recent Labs     05/13/20  0324 05/14/20  0518   CREATININE 0.4 0.5     Estimated Creatinine Clearance: 189 mL/min (based on SCr of 0.5 mg/dL). Lab Results   Component Value Date/Time    ALT 31 05/12/2020 03:11 PM    AST 14 05/12/2020 03:11 PM    ALKPHOS 49 05/12/2020 03:11 PM     Ht/Wt:   Ht Readings from Last 1 Encounters:   05/12/20 5' 10\" (1.778 m)        Wt Readings from Last 1 Encounters:   05/13/20 186 lb 8.2 oz (84.6 kg)       Indication for valproic acid : seizures    Assessment/Plan:  Home dose: Depakote  mg BID  Bolus: 750 mg x 1 dose given 5/13 1200. Valproic acid level ordered for this morning. Will assess level and order maintenance dose based on level. Will order one-time 750 mg dose to be given after the valproic acid level is collected to not delay therapy. Thank you for the consult. Will continue to follow.     Juan C Bucio PharmD, BCPS   5/14/2020 8:52 AM

## 2020-05-14 NOTE — PROGRESS NOTES
Nutrition Assessment    Type and Reason for Visit: Initial, Consult(TF ordering and management)    Nutrition Recommendations: If u/a to extubate and TF desired - consider starting Vital 1.2 at 20 ml/hr; increase by 10 ml every 6 hours, as tolerated, to goal rate of 55 ml/hr. Free water flush per MD.      Nutrition Assessment:   Pt. nutritionally compromised AEB NPO status d/t intubation. At risk for further nutrition compromise r/t metastatic melanoma - mets to brain, chemotherapy, poor progrnosis and underlying medical condition (hx TBI, seizures). Nutrition recommendations/interventions as per above. Malnutrition Assessment:  · Malnutrition Status: Insufficient data  · Context: Acute illness or injury  · Findings of the 6 clinical characteristics of malnutrition (Minimum of 2 out of 6 clinical characteristics is required to make the diagnosis of moderate or severe Protein Calorie Malnutrition based on AND/ASPEN Guidelines):  1. Energy Intake-Unable to assess,      2. Weight Loss-2% loss or greater(3%), in 1 week  3. Fat Loss-Unable to assess,    4. Muscle Loss-Unable to assess,    5. Fluid Accumulation-Unable to assess,    6.  Strength-Not measured    Nutrition Risk Level: High    Nutrient Needs:  · Estimated Daily Total Kcal: 9806-4906 kcals (20-25 kcals/kgm wt of 85 kgm)  · Estimated Daily Protein (g): 98+ grams/day (1.3+ grams/kgm IBW)  · Estimated Daily Total Fluid (ml/day): per MD    Nutrition Diagnosis:   · Problem: Inadequate oral intake  · Etiology: related to Impaired respiratory function-inability to consume food     Signs and symptoms:  as evidenced by NPO status due to medical condition    Objective Information:  · Nutrition-Focused Physical Findings: vent; sedated with diprovan at 17.1 ml/hr (providing pt. With 451 kcals from IV lipid emulsion); MAP 97, belly soft per RN;  Rx includes Insulin, Haldol, MVI; 5/14: Glucose 225; per RN - plan meeting to discuss about hospice, etc - reports

## 2020-05-15 NOTE — PROGRESS NOTES
Made visit to patients room, no family here at present time. Patients nurse attempted to contact his wife and there was no answer. Left a voicemail and asked her to return our call. This nurse left my number with ICU nurse and asked her to give my number to the wife once she calls so I can speak with her.

## 2020-05-15 NOTE — CARE COORDINATION
5/14/20, 1:23 PM EDT    [place]CHARGE [place] [address] [facility] day: 2  Location: -14/014-A Reason for admit: Seizure [facility]) [R56.9]  Seizure ([facility] [address] [R56.9]   Procedure:   5/12 EEG  5/13 Intubation. Treatment Plan of Care: Intensivist following. Remains sedated and on vent. Palliative care if following and met with wife (has cancer and pneumonia). Plan is to reassess patient status tomorrow and likely extubate. She is interested in [employer]. Currently on propofol gtt. IV steroid. Vimpat bid. IV zosyn. Barriers to Discharge: Await decision on plan of care. PCP: Miguel Tavarez MD  Readmission Risk Score: 23%  Patient Goals/Plan/Treatment Preferences: Pending progress. Wife would like to transition patient to home if decides on hospice.
5/15/20, 12:46 PM EDT    DISCHARGE ONGOING EVALUATION:     Daisy Santamaria day: 3  Location: -14/014-A Reason for admit: Seizure ([facility] [address] [R56.9]  Seizure ([facility] [address] [R56.9]   Treatment Plan of Care: Plan extubation and transition to hospice today. PCP: Amelia Buitrago MD  Readmission Risk Score: 23%  Patient Goals/Plan/Treatment Preferences: Planning discharge to hospice, wife's preference is to transition to home if patient is stable enough for transport.
Spouse/Significant Other  Current Services PTA:     Potential Assistance Needed:  N/A  Potential Assistance Purchasing Medications:  No  Does patient want to participate in local refill/ meds to beds program?  Yes  Type of Home Care Services:  None  Patient expects to be discharged to:  home  Expected Discharge date:  05/14/20  Follow Up Appointment: Best Day/ Time: (anytime)    Patient Goals/Plan/Treatment Preferences: Candie Poole currently lives at home with his wife. He is currently undergoing cancer treatment for melanoma. Candie Poole is unable to answer questions at time of visit. Will follow clinical course for discharge needs. Transportation/Food Security/Housekeeping Addressed:  No issues identified.     Evaluation: no

## 2020-05-15 NOTE — PROGRESS NOTES
Lelo Mcdowell  [facility]: [id number]            Ching Pinto MD  Date of Admission: 5/12/2020     Assessment and Plan(All pulmonary edema, renal failure, PE, and respiratory failure diagnoses are acute in nature unless otherwise specified):        1. Acute respiratory failure: Patient intubated for airway protection. Patient extubated 5/15/2020. No reintubation. 2. Aspiration pneumonia:  Secondary to E. coli, Haemophilus influenza, and Streptococcus agalactiae.  Clearly associated with aspiration event with seizure activity.  Zosyn initiated 5/13/2020.  3. Recurrent seizure activity: EEG shows post ictal state.  Patient on Depacon and Vimpat.  Currently seizures suppressed. 4. Vasogenic cerebral edema: Secondary metastatic disease.  On steroids. 5. Intracranial hemorrhage: Secondary to bleeding from metastatic disease. 6. Brain metastases: Secondary to metastatic melanoma.  Very poor response to chemotherapy and radiation therapy.  Now associated with cerebral edema and intracranial hemorrhage. Family has agreed to hospice. 7. Metastatic melanoma: Patient has progression of melanoma with increasing tumor size despite chemotherapy and radiation therapy. Extensive metastatic disease within the brain and lungs. Family agrees with hospice. They would like to have patient home with hospice. 8. Pulmonary metastases: Secondary to metastatic melanoma. 9. Hypertension: On lisinopril. 10. Diabetes mellitus: Sliding scale insulin. 11. Traumatic brain injury: Remote injury. 12. Tobacco abuse: Cessation encouraged.    15. Frequent alcohol consumption: On vitamin supplementation.        CC: Metastatic melanoma  HPI: Patient is a 75-year-old white male active smoker. Elham Fletcher has a history of traumatic brain injury in 2009. Antony Grady had history of left back melanoma that was diagnosed in 2012 and was excised. Elham Fletcher was lost to follow-up.  In February 2020, patient

## 2020-05-15 NOTE — PROGRESS NOTES
Met with wife before patient was taken off the ventilator. She was tearful and had several questions regarding his prognosis. She wanted to know if he would be able to breathe on his own. We discussed the patients history of his cancer and the progression of the disease. Miryam (patient's wife) seems to think that the patient will wake up from this episode. I discussed with her how the disease has spread throughout his body and how his body is not able to fight the disease any longer and she said she understands but is praying for the best. Respiratory came in around 5:30 to take the patient off of the ventilator along with two ICU nurses. Patient tolerated well and wife moved to the patients bedside after it was done and held his hand. Plans are to move patient to [facility] and follow patient there. If he remains stable wife would like to take him home with hospice.

## 2020-05-15 NOTE — PLAN OF CARE
remained free of restraint-related injury. Problem: Nutrition  Goal: Optimal nutrition therapy  Outcome: Ongoing  Note: Patient currently NPO     Care plan reviewed with patient and family. Family verbalize understanding of the plan of care and contribute to goal setting.

## 2020-05-15 NOTE — PROGRESS NOTES
[facility] THERAPY MISSED TREATMENT NOTE  [facility] ICU 4D      Date: 5/15/2020  Patient Name: Kusum Gardner        MRN: [id number]    : 1975  (50 y.o.)    REASON FOR MISSED TREATMENT:  Attempted to see patient for potential completion of BSE. BHASKAR Montgomery reports, \"Patient continues to be intubated. Plans for family meeting this date with potential plans to extubate and transition to hospice. \"  ST to hold evaluate this date; please call [phone] if speech therapy evaluation is warranted post extubation, pending family meeting. \"  BHASKAR Montgomery verbalized understanding. ST to continue to follow with patient POC and complete evaluations as appropriate.      Ant Lopez M.S. [address]

## 2020-05-16 NOTE — FLOWSHEET NOTE
Pt is a 53y.o. male propped up in bed watching television in [employer]. His wife, while at the hospital, was not in the room at the time of visit. He was conscious, alert but struggling with mucous. Pt did respond to  greeting and request to pray for him, but barely words.  prayed for him and got a nurse so as to reposition nasal oxygen tubes that had fallen. 05/15/20 2200   Encounter Summary   Services provided to: Patient   Referral/Consult From: Other    Continue Visiting Yes  (5/15)   Complexity of Encounter Low   Length of Encounter 15 minutes   Spiritual/Alevism   Type Spiritual support   Assessment Calm; Approachable; Unable to respond   Intervention Prayer   Outcome Did not respond

## 2020-05-16 NOTE — PROGRESS NOTES
Pt's wife called and spoke with this nurse regarding pt's condition. Pt's wife updated that pt is resting well and no issues at this time.

## 2020-05-16 NOTE — PROGRESS NOTES
and brain metastasis  6. Hypertension  7. Type 2 diabetes  8. History of TBI  9. Neck stiffness/erythema: Possibly related to metastatic lymphadenopathy, versus self removal of IJ. No evidence of respiratory compromise or airway collapse at this time. Will monitor. 10. NPO: SLP can re-eval, however patient can eat for comfort. 11. Tobacco abuse  12. Alcohol misuse: MVI        Expected discharge date:  ? TBD    Disposition: Home with hospice vs IP hospice vs SNF         [] Home       [] TCU       [] Rehab       [] Psych       [] SNF       [] [facility]       [] Other-    --------------------------------------------    Chief Complaint: Seizures    Hospital Course: Per HPI and ICU course, \"Patient is a 69-year-old white male active smoker. Maged Bobby has a history of traumatic brain injury in 2009. Rere Eaton had history of left back melanoma that was diagnosed in 2012 and was excised. Maged Bobby was lost to follow-up.  In February 2020, patient developed new onset seizure activity.  Work-up demonstrated numerous enhancing lesions of the brain consistent with metastatic disease. Davon Parikh was hemorrhagic component.  He also was found to have bilateral pulmonary nodules consistent with metastatic disease. Maged Bobby was subsequently diagnosed with metastatic melanoma and underwent treatment.  Patient received both chemotherapy and radiation therapy with out improvement in brain metastases.  Mass is actually increased in size.   Patient was admitted on 5/12/2020 with recurrent seizure activity. Maged Bobby had a prolonged seizure that lasted approximately 25 minutes with postictal phase.  Seizure did self terminate without medications.  Patient underwent repeat CT scan head which demonstrated intracranial masses associated with intracerebral hemorrhage with surrounding vasogenic edema associated with left to right midline shift.  Patient was seen by neurology and loaded with Keppra and continued with Decadron.  Unfortunately, patient had clear.  HENT: Head normal in appearance. External nares normal.  Poor oral dentition. Hearing grossly intact. Neck: Full, with mild erythema and stiffness noted. Suspect lymphadenopathy in the bilateral cervical chains  Respiratory: Tachypneic respiratory effort. Decreased sounds in the bases with mild rhonchi noted left greater than right, poor inspiratory effort. Cardiovascular: Normal rate, regular rhythm with normal S1/S2 without murmurs. No lower extremity edema. Abdomen: Soft, non-tender, non-distended with normal bowel sounds. Musculoskeletal: Normal range of motion in extremities. There is no joint swelling or tenderness. Skin: Skin color, texture, turgor normal.  No rashes or lesions. Neurologic: Unable to fully test but does move all extremities. Pupils equally round and reactive to light bilaterally. No active seizure activity is noted. Psychiatric: Somnolent and unable to answer orientation questions. Capillary Refill: Brisk,< 3 seconds. Peripheral Pulses: +2 palpable, equal bilaterally. Labs:   Recent Labs     05/14/20  0518 05/15/20  0435   WBC 14.2* 11.1*   HGB 12.9* 12.6*   HCT 38.2* 37.8*    171     Recent Labs     05/14/20  0518 05/15/20  0435    141   K 3.8 3.6    106   CO2 25 28   BUN 11 17   CREATININE 0.5 0.5   CALCIUM 9.3 9.2     No results for input(s): AST, ALT, BILIDIR, BILITOT, ALKPHOS in the last 72 hours. No results for input(s): INR in the last 72 hours. No results for input(s): Ether Narciso in the last 72 hours. Microbiology:      Urinalysis:      Lab Results   Component Value Date    NITRU NEGATIVE 05/12/2020    WBCUA 0-2 02/10/2020    BACTERIA NONE SEEN 02/10/2020    RBCUA 3-5 02/10/2020    BLOODU NEGATIVE 05/12/2020    SPECGRAV 1.011 02/10/2020    GLUCOSEU 500 05/12/2020       Radiology:  XR CHEST PORTABLE   Final Result      New small infiltrate or atelectasis at the medial right lung base.    Previously noted mediastinal prominence less apparent. Stable satisfactory positions of the life support lines and tubes. **This report has been created using voice recognition software. It may contain minor errors which are inherent in voice recognition technology. **      Final report electronically signed by Dr. Laz Gooden on 5/14/2020 4:27 AM      XR CHEST PORTABLE   Final Result      Stable satisfactory positions of the life support lines and tubes. No acute pneumonia. 9 x 8 mm right upper lobe pulmonary nodule and 2.6 x 2.3 cm right basilar nodule consistent with metastatic disease. Possible mediastinal adenopathy. Consider chest CT with contrast.      **This report has been created using voice recognition software. It may contain minor errors which are inherent in voice recognition technology. **      Final report electronically signed by Dr. Laz Gooden on 5/13/2020 11:00 PM      XR CHEST PORTABLE   Final Result   1. Appropriately positioned lines and tubes. 2. Possible early left lower lobe pneumonia. **This report has been created using voice recognition software. It may contain minor errors which are inherent in voice recognition technology. **      Final report electronically signed by Dr. Karen Cabello on 5/13/2020 2:24 PM      XR CHEST PORTABLE   Final Result   1. There is a known mass at the right lung base. Additional nodules were seen on the chest on the previous CT chest examination dated 2/11/2020. Please refer to this examination for further evaluation of the lung nodules. 2. There is no consolidation or infiltrates. There is no pleural effusion or pulmonary vascular congestion. **This report has been created using voice recognition software. It may contain minor errors which are inherent in voice recognition technology. **      Final report electronically signed by Dr. Matthew Figueroa on 5/13/2020 11:02 AM      CT HEAD WO CONTRAST   Final Result   Redemonstrated are multiple

## 2020-05-16 NOTE — PLAN OF CARE
Problem: Falls - Risk of:  Goal: Will remain free from falls  Description: Will remain free from falls  Outcome: Ongoing  Note: Patient fall assessment completed. Patient instructed on using call light. Bed alarm utilized for patient's safety. All personal belongings within reach. Patient turns and changes position in bed often. Tele sitter in patient's room for safety. Problem: Neurological  Goal: Maximum potential motor/sensory/cognitive function  Outcome: Ongoing  Note: Patient is alert to name. He is unaware of time, situation, or place. He doesn't follow commands for pedal push and pull. Will continue to monitor. Problem: Respiratory  Goal: No pulmonary complications  Outcome: Ongoing  Note: Patient is comfort care. He will not keep nasal cannula on at this time. The patient hasn't needed suctioned so far this shift. He is coughing. Problem: Discharge Planning:  Goal: Discharged to appropriate level of care  Description: Discharged to appropriate level of care  Outcome: Ongoing  Note: Patient is from home with wife. His current plans are to possibly go home with hospice. Will continue to work on plan. Problem: Nutrition  Goal: Optimal nutrition therapy  Outcome: Ongoing  Note: Patient is currently NPO. IV fluids infusing for hydration. Care plan reviewed with patient's wife. Patient's wife needs reinforcement on understanding of the plan of care and contribute to goal setting.

## 2020-05-17 NOTE — PLAN OF CARE
Problem: Falls - Risk of:  Goal: Will remain free from falls  Description: Will remain free from falls  5/16/2020 2242 by Sandra Parrish RN  Outcome: Ongoing  Note: No falls noted this shift. Patient up with assistance. Bed in low position with bed alarm on. 2/4 side rails up. Call light within reach. Non skid socks on when ambulating. Hourly rounding. Problem: Neurological  Goal: Maximum potential motor/sensory/cognitive function  5/16/2020 2242 by Sandra Parrish RN  Outcome: Ongoing  Note: Alert to name at times, disoriented to time, place, situation. Problem: Cardiovascular  Goal: Hemodynamic stability  5/16/2020 2242 by Sandra Parrish RN  Outcome: Ongoing     Problem: Respiratory  Goal: No pulmonary complications  8/30/8382 2242 by Sandra Parrish RN  Outcome: Ongoing  Note: Lung sounds diminished, pt on RA d/t not leaving nasal cannula in 91%. Infrequent coughing noted, no suction needed. Problem: Nutrition  Goal: Optimal nutrition therapy  5/16/2020 2242 by Sandra Parrish RN  Outcome: Ongoing  Note: Pt NPO at this time     Problem: Discharge Planning:  Goal: Discharged to appropriate level of care  Description: Discharged to appropriate level of care  5/16/2020 2242 by Sandra Parrish RN  Outcome: Ongoing  Note: Discharge unknown at this time, possibly return home with hospice. Will continue to monitor for discharge needs. Problem: Injury - Risk of, Physical Injury:  Goal: Will remain free from falls  Description: Will remain free from falls  5/16/2020 2242 by Sandra Parrish RN  Outcome: Ongoing  Note: No falls noted this shift. Patient up with assistance. Bed in low position with bed alarm on. 2/4 side rails up. Call light within reach. Non skid socks on when ambulating. Hourly rounding. Care plan reviewed with patient and spouse. Patient and spouse verbalize understanding of the plan of care and contribute to goal setting.

## 2020-05-17 NOTE — PROGRESS NOTES
Jigar Nikolay on 5/13/2020 2:24 PM      XR CHEST PORTABLE   Final Result   1. There is a known mass at the right lung base. Additional nodules were seen on the chest on the previous CT chest examination dated 2/11/2020. Please refer to this examination for further evaluation of the lung nodules. 2. There is no consolidation or infiltrates. There is no pleural effusion or pulmonary vascular congestion. **This report has been created using voice recognition software. It may contain minor errors which are inherent in voice recognition technology. **      Final report electronically signed by Dr. Christopher Cordova on 5/13/2020 11:02 AM      CT HEAD WO CONTRAST   Final Result   Redemonstrated are multiple intracerebral mixed attenuating/hemorrhagic masses largest in the left high parietal lobe mass effect, surrounding vasogenic edema and slight left to right midline shift. This lesion has increased in size when compared to    prior head CTA examination dated February 12, 2020 but similar to prior brain MRI dated 4/2/2020. Continued follow-up is advised. **This report has been created using voice recognition software. It may contain minor errors which are inherent in voice recognition technology. **      Final report electronically signed by Dr. Benedict Viramontes on 5/12/2020 5:07 PM          DVT prophylaxis: [] Na     Code Status: DNR-CC    PT/OT Eval Status: Na    Diet:   No diet orders on file    Fluids: gentle    Tele:   [] yes             [x] no      Electronically signed by Chaka Fournier DO on 5/17/2020 at 2:09 PM

## 2020-05-17 NOTE — PROGRESS NOTES
Wife called and nurse updated her on pt condition, no new questions at this time. Wife will be here at 1:00 for meeting with hospice.

## 2020-05-18 PROBLEM — C43.9 METASTATIC MELANOMA (HCC): Status: ACTIVE | Noted: 2020-01-01

## 2020-05-18 NOTE — PROGRESS NOTES
This RN spoke with patient's wife, Danyelle Vaughn, who voices no further concerns or questions at this time.

## 2020-05-18 NOTE — TELEPHONE ENCOUNTER
She would need to discuss further radiation therapy with the radiation oncologist.  The patient is not a candidate for chemotherapy treatment. It would not help him in this condition.

## 2020-05-19 NOTE — PLAN OF CARE
Problem: Coping:  Goal: Ability to participate in care decisions during the dying process will improve  Description: Ability to participate in care decisions during the dying process will improve  Outcome: Ongoing  Note: Patient is not alert or oriented to participate in care. Problem: Coping:  Goal: Family's ability to cope with current situation will improve  Description: Family's ability to cope with current situation will improve  Outcome: Ongoing  Note: Patient's wife at bedside, restated several times to wife that the goal is to keep the patient comfortable. Wife questions if there is any further treatment options. Reiterated to wife that the goal is to keep the patient comfortable. Problem: Physical Regulation:  Goal: Complications related to the disease process, condition or treatment will be avoided or minimized  Description: Complications related to the disease process, condition or treatment will be avoided or minimized  Outcome: Ongoing  Note: IV anticonvulsants given for comfort. 2L of O2 applied to patient for comfort. Problem: Respiratory:  Goal: Verbalizations of increased ease of respirations will increase  Description: Verbalizations of increased ease of respirations will increase  Outcome: Ongoing  Note: Patient unable to answer if he is comfortable. O2 applied for comfort, PRN Morphine and Robitol given to ease increased respirations. Problem: Role Relationship:  Goal: The number of positive interactions the caregiver has with the patient will increase  Description: The number of positive interactions the caregiver has with the patient will increase  Outcome: Ongoing  Note: Wife at bedside, speaking to  throughout shift. Patient able to respond to wife occasionally.        Problem: Skin Integrity:  Goal: Risk for impaired skin integrity will decrease  Description: Risk for impaired skin integrity will decrease  Outcome: Ongoing  Note: Patient turned and repositioned when necessary throughout shift. Patient becomes agitated when repositioned.

## 2020-05-20 PROBLEM — D64.9 CHRONIC ANEMIA: Status: ACTIVE | Noted: 2020-01-01

## 2020-05-20 PROBLEM — C71.9 GLIOBLASTOMA (HCC): Status: ACTIVE | Noted: 2020-01-01

## 2020-05-20 NOTE — PLAN OF CARE
Problem: Coping:  Goal: Ability to participate in care decisions during the dying process will improve  Description: Ability to participate in care decisions during the dying process will improve  5/20/2020 0114 by Leonila Otero RN  Outcome: Ongoing  Note: Hospice and spiritual care providing support to wife Miryam. Problem: Skin Integrity:  Goal: Risk for impaired skin integrity will decrease  Description: Risk for impaired skin integrity will decrease  5/20/2020 0114 by Leonila Otero RN  Outcome: Ongoing  Note: No new skin concerns at this time, turn and repositioning every 2 hours, pillow support elevated heels off bed. Problem: Pain:  Goal: Pain level will decrease  Description: Pain level will decrease  5/20/2020 0114 by Leonila Otero RN  Outcome: Ongoing  Note: Pt resting in bed with eyes closed, occasional moaning/agitation, PRN morphine given     Problem: Discharge Planning:  Goal: Discharged to appropriate level of care  Description: Discharged to appropriate level of care  5/20/2020 0114 by Leonila Otero RN  Outcome: Ongoing  Note: Pt admitted to inpatient hospice. Care plan reviewed with patient. Patient verbalize understanding of the plan of care and contribute to goal setting.

## 2020-05-20 NOTE — PROGRESS NOTES
malignancy. Constitutional: Negative. HENT: Negative. Eyes: Negative. Respiratory: Negative. Cardiovascular: Negative. Gastrointestinal: Negative. Genitourinary: Negative. Musculoskeletal: Negative. Skin: Negative. Hematological: Negative. Psychiatric/Behavioral: Negative. Objective:   Physical Exam  Vitals:    05/07/20 1411   BP: 130/85   Pulse: 125   Resp:    Temp:    SpO2: 95%   Vitals reviewed and are stable. Constitutional: Appears comfortable. No acute distress. HENT: No thrush. Oral mucosa clear. Eyes: Pupils are equal and reactive. No scleral icterus. Neck: Bilateral appearance is symmetrical. No identifiable masses. Pulmonary: Effort normal. No respiratory distress. No rhonchi, rales or wheezing. Cardiovascular: Regular rate and rhythm normal S1 and S2. Abdominal: Soft. Bowel sounds present. No hepatomegaly or splenomegaly. Musculoskeletal: Gait is abnormal. Muscle strength and tone decreased in all four extremities. Neurological: Alert and conversive but does not answer questions appropriately. Has confusion. Skin: Scattered ecchymosis noted on bilateral upper forearms. Psychiatric: Mood and affect are inappropriate. He does not appear to comprehend the seriousness of his malignancy. The patient acts giddy at times. Data Analysis: The following studies were reviewed with the patient today:    Hematology 5/7/2020 3/12/2020 2/17/2020   WBC 10.5 9.6 15.0 (H)   RBC 4.20 (L) 4.50 (L) 4.70   HGB 13.7 (L) 14.7 15.6   HCT 41.1 (L) 43.4 44.4   MCV 98 (H) 96 (H) 94.5 (H)   RDW 15.1 (H) 12.1     134 288     Assessment:   1. Metastatic melanoma. 2.  Central nervous system metastases. 3.  Seizures. 4.  Mild anemia. Plan:   1. Continue radiation therapy treatment plan as outlined by radiation oncology. 2.  Continue Decadron indefinitely for patient's headaches and central nervous system edema. 3.  Continue anti seizure medication.   4. Consider palliative care/hospice care referral depending on results of the radiation therapy to his progressive central nervous system metastasis. 5.  Monitor hemoglobin/hematocrit and for any signs of blood loss. Luis Alberto Park M.D. Medical Director: [facility]  Cancer [place] [address], [address] [place] [address], [address] [place] [address], [address] [place] [address] of the [facility] at the [facility]      **This report has been created using voice recognition software. It may contain minor errors which are inherent in voice recognition technology. **

## 2020-05-20 NOTE — FLOWSHEET NOTE
05/20/20 1359   Encounter Summary   Services provided to: Family   Referral/Consult From: [address]; Family members   Continue Visiting No  (5/20 no due to death.)   Complexity of Encounter Low   Length of Encounter 15 minutes   Grief and Life Adjustment   Type Death   Assessment Tearful;Grieving   Intervention Sustaining presence/ Ministry of presence     Hospice chaplain arrived after patient's death and after Kody Wang offered spiritual support and prayers. Family members gathered around the room, discussing patient's life and burial plans at [facility] in the [facility], using [facility] in [place]. Spiritual needs are met. Staff informed Miryam of follow-up contact with her in a couple of weeks to check on adjustment. She voiced understanding.

## 2020-05-20 NOTE — PROGRESS NOTES
[address] [place] [address]  Notice of Patient Passing      Patient Name- Jaspal Hardy Number- [de-identified]   Attending Physician- Leila Sauer MD    Admitted on-5/18/2020 10:52 AM     On 5/20/2020 at 1310 patient was found in [id number] with:   Absence of vital signs. Absence of neurological response. Confirmed time of death at 0. Physician or On-call Physician notified of time of death- yes    Family present at time of death- yes, Wife and multiple family members   Spiritual care present at time of death- no Malinda at bedside shortly after passing. Physician was notified and orders were obtained to release the body. Post-Mortem documentation completed; form printed, signed, and given to admitting.     Eunice Palomares RN Nursing Supervisor/ Manager  5/20/20   1:42 PM
Called to room and patient noted to be absent of vital signs and neurological function. Went down stairs to bring family to floor. Much emotional support given. Spiritual care called. Bereavement coordinator aware of need for service as high risk. Checked on family few times with support.
Family members still visiting patient, patient's  children in to see patient now.
Patient . No respirations, no heart rate noted. Family present at bedside. Hospice nurse Osei Everett RN present. dr. Joe Pérez and house supervisor notified.
unable to be done in other setting.
associated with left to right midline shift.  Patient was seen by neurology and loaded with Keppra and continued with Decadron.  Unfortunately, patient had recurrent seizure activity with poor airway control.  He was transferred from medical floor to ICU on 5/13/2020.  He required intubation and was found to have copious mucus within the airway.  PCR was sent and cultures were obtained. German Giles was started on Zosyn for aspiration pneumonia.  Multiple organisms were identified including E. coli, Haemophilus influenza, and Streptococcus agalactiae. \"    Patient was transferred on 5/15 p.m. to  to continue with hospice. On 5/18 patient was transitioned to inpatient hospice due to increased IV medication need. Subjective (past 24 hours): Patient is laying in bed but not responding to any cues. He does not appear agitated or in any acute distress. Patient's wife, Roland Lu, is at bedside and updated on status, questions answered. Medications:  Reviewed    Infusion Medications     Scheduled Medications    dexamethasone  4 mg Intravenous Q6H    lacosamide (VIMPAT) IVPB  100 mg Intravenous BID    valproate sodium (DEPACON) IVPB  750 mg Intravenous Q8H    scopolamine  1 patch Transdermal Q72H     PRN Meds: acetaminophen, albuterol, glycopyrrolate, haloperidol lactate, LORazepam, morphine      Intake/Output Summary (Last 24 hours) at 5/19/2020 1545  Last data filed at 5/19/2020 1255  Gross per 24 hour   Intake 200.83 ml   Output --   Net 200.83 ml     Weight change:       Exam:  BP (!) 139/95   Pulse 135   Temp 97.4 °F (36.3 °C) (Oral)   Resp 24   SpO2 (!) 77% Comment: cc    General appearance: Acute on chronically ill, somnolent and not responding to verbal or physical stimulus  Eyes:  Pupils equal, round, and reactive to light. HENT: Head normal in appearance. External nares normal.  Poor oral dentition. Neck: Full, with mild erythema and stiffness noted.   Suspect lymphadenopathy in the bilateral

## 2022-01-26 NOTE — PROGRESS NOTES
Hospitalist Progress Note    [facility]      Unit/Bed:4A-11/011-A    YOB: 1975    MRN: [id number]       Acct: [de-identified]     PCP: No primary care provider on file. Date of Admission: 2/10/2020      Assessment/Plan:  Active Hospital Problems    Diagnosis Date Noted    Acute respiratory failure ([facility] [address] [J96.00]     Seizures ([facility] [address] [R56.9] 02/10/2020       1. New onset Seizure: likely from lesions in brain with edema. Also EtOH abuse so this likely contributed as well. 1. No recurrences  2. Decadron as noted below  3. Keppra initiated, continue. 4. Neurology following -> to sign off.   5. Seizure precautions upon discharge. 2. Brain neoplasm: Noted multiple areas hemorrhagic lesions in brain  1. MRI shows \"Numerous enhancing masses in the brain. This is most compatible with metastatic disease. Some of these have associated internal hemorrhage. These range in size from punctate to 3.3 cm. There is edema associated with the larger metastases. \"  2. Consult neurosurgery.  One time dose of 10 mg of Decadron, followed by 4 mg 3 times daily.    1. No plans for operative intervention  3. Consult to Daniela Herring following and starting RT to brain (2/14/20 first rx)  1. Next round on Monday  2. Will touch base regarding decadron taper prior to discharge. 3. 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 Discussed with Neurosurgery -> patient is not a candidate for intervention and this can be monitored   1. Will discuss alcohol cessation, BP control, and smoking cessation as alternative risk factor modification  4. Lung nodules: Noted on CT scan 2/11, most likely metastasis.  Oncology has been consulted. 5. Acute respiratory failure: Intubated in the ER for airway protection 2/10, extubated 2/12, continue on nasal cannula  1. Weaned to room air. 2. IS  6. Malignant melanoma: Noted to have lesion from back removed February 2019. Pt with pmh DM, obese, presents to ed with intermittent left sided abd pain x 6 days.  Pt denies any fevers/n/v/d/sob/cp/cough/urinary symptoms    [facility]  Triage Provider Note    Patient: Araseli Spence  Age: 29 year old  Gender: male    Vitals:    01/26/22 0611   BP: 132/80   Pulse: 89   Resp: 16   Temp: 98.6 °F (37 °C)       This note is to be is for triage purposes only.  There is no intention to diagnose, treat or disposition patient unless otherwise stated.    .       Brenda Newton PA-C  01/26/22 0801      Patient did not follow-up, was to have PET scan completed at [facility].  This was not completed, family states that patient did not want to have a PET scan. 1. Very poor insight  2. Involve palliative care  3. Oncology consulted - will follow as OP. 7. Leukocytosis: no fevers. Procal negative. Suspect reactive to metastatic disease. 8. Hypertension: lisinopril 5 mg daily, will increase to 10mg given elevated BP. 1. Suspect some HTN related to steroids. 2. Goal <140 with aneurysm. 9. Non Anion gap metabolic acidosis: POA, likely related to seizures, resolving. 10. Diabetes mellitus: improved; A1c 6.0, family denies use of insulin at home, notes reviewed from [facility] states patient is diabetic. 1. Glucose improved, stopped insulin coverage   2. Monitor while on steroids  11. Elevated liver enzymes: improved;  Known EtOH abuse, monitor. 1. Resolved. No need to trend. 12. Alcohol abuse: reports 12 pack daily for years. Denies prior withdrawals or seizures. 1. MVI, Thiamine, Folvite  2. CIWA for monitoring, no evidence of withdrawal.   3. Addiction consulted. 13. Roman's esophagus: PPI twice daily   14. Hx of TBI: in 2009 with ? Residual cognitive deficit  15. Cognitive Deficit: may be related to TBI vs EtOH vs metastatic brain lesions with edema. 16. Tobacco abuse: counseling, offer NRT      Expected discharge date:  1-3 days    Disposition: tbd - likely would benefit from SNF for therapy compliance and ongoing therapy. [] Home       [] TCU       [] Rehab       [] Psych       [] SNF       [] [place]       [] Other-    --------------------------------------------    Chief Complaint: seizures    Hospital Course: Charlie Brunner is a 51yo M with PMHx of TBI and melanoma (s/p excision) who presents after apparent seizure like activity. He was initially sent to the ICU after intubation for airway protection.   Imaging of the brain revealed multiple lesions with hemorrhagic left temporoparietal lobes concerning for neoplasm. Largest in the temporal lobe is 3.1 x 2.3 with central hypoattenuation. Largest in the high parietal lobe is 3.2 x 2 cm and is uniformly hyperdense. Infection is thought to be less likely. Further evaluation with contrast-enhanced brain MRI is advised. No midline shift at this time however there is a fair amount of associated edema surrounding individual lesions. These results were communicated directly with Willy Stover [facility] on 2/10/2020 2:46 PM,  [ ]         **This report has been created using voice recognition software. It may contain minor errors which are inherent in voice recognition technology. **      Final report electronically signed by Dr. Jillian Wan on 2/10/2020 2:48 PM          DVT prophylaxis: [] Lovenox                                 [x] SCDs                                 [] SQ Heparin                                 [] Encourage ambulation           [] Already on Anticoagulation     Code Status: Full Code    PT/OT Eval Status: yes    Diet:   DIET GENERAL;  Dietary Nutrition Supplements: Low Calorie High Protein Supplement    Fluids: n/a    Tele:   [x] yes             [] no      Electronically signed by Silvia Luz DO on 2/16/2020 at 2:57 PM